# Patient Record
Sex: FEMALE | Race: WHITE | Employment: OTHER | URBAN - METROPOLITAN AREA
[De-identification: names, ages, dates, MRNs, and addresses within clinical notes are randomized per-mention and may not be internally consistent; named-entity substitution may affect disease eponyms.]

---

## 2022-03-18 PROBLEM — I51.89 GRADE II DIASTOLIC DYSFUNCTION: Status: ACTIVE | Noted: 2021-08-16

## 2022-03-18 PROBLEM — E78.5 HYPERLIPIDEMIA: Status: ACTIVE | Noted: 2021-08-16

## 2022-03-18 PROBLEM — R06.09 DOE (DYSPNEA ON EXERTION): Status: ACTIVE | Noted: 2021-08-16

## 2022-03-18 PROBLEM — I34.0 MILD MITRAL REGURGITATION BY PRIOR ECHOCARDIOGRAM: Status: ACTIVE | Noted: 2021-08-16

## 2022-03-19 PROBLEM — I10 HYPERTENSION: Status: ACTIVE | Noted: 2021-08-16

## 2022-03-19 PROBLEM — E66.3 OVERWEIGHT (BMI 25.0-29.9): Status: ACTIVE | Noted: 2021-08-16

## 2023-07-24 ENCOUNTER — INITIAL CONSULT (OUTPATIENT)
Age: 88
End: 2023-07-24
Payer: MEDICARE

## 2023-07-24 VITALS
WEIGHT: 132 LBS | HEIGHT: 60 IN | SYSTOLIC BLOOD PRESSURE: 148 MMHG | BODY MASS INDEX: 25.91 KG/M2 | HEART RATE: 78 BPM | DIASTOLIC BLOOD PRESSURE: 88 MMHG

## 2023-07-24 DIAGNOSIS — I48.0 PAROXYSMAL ATRIAL FIBRILLATION (HCC): Primary | ICD-10-CM

## 2023-07-24 DIAGNOSIS — I10 PRIMARY HYPERTENSION: ICD-10-CM

## 2023-07-24 PROCEDURE — 1123F ACP DISCUSS/DSCN MKR DOCD: CPT | Performed by: INTERNAL MEDICINE

## 2023-07-24 PROCEDURE — 99214 OFFICE O/P EST MOD 30 MIN: CPT | Performed by: INTERNAL MEDICINE

## 2023-07-24 PROCEDURE — 93000 ELECTROCARDIOGRAM COMPLETE: CPT | Performed by: INTERNAL MEDICINE

## 2023-07-24 RX ORDER — OLMESARTAN MEDOXOMIL 40 MG/1
TABLET ORAL
COMMUNITY
Start: 2023-04-20

## 2023-07-24 RX ORDER — DICYCLOMINE HYDROCHLORIDE 10 MG/1
CAPSULE ORAL
COMMUNITY
Start: 2023-04-20

## 2023-07-24 RX ORDER — LEVOTHYROXINE SODIUM 88 UG/1
TABLET ORAL
COMMUNITY
Start: 2023-04-20

## 2023-07-24 RX ORDER — AMLODIPINE BESYLATE 2.5 MG/1
TABLET ORAL
COMMUNITY
Start: 2023-07-20

## 2023-07-24 RX ORDER — ALENDRONATE SODIUM 35 MG/1
TABLET ORAL
COMMUNITY
Start: 2023-04-20

## 2023-07-24 RX ORDER — SULINDAC 150 MG/1
75 TABLET ORAL
COMMUNITY
Start: 2023-04-20

## 2023-07-24 RX ORDER — METOPROLOL TARTRATE 50 MG/1
TABLET, FILM COATED ORAL
COMMUNITY
Start: 2023-05-12

## 2023-07-24 RX ORDER — SIMVASTATIN 40 MG
TABLET ORAL
COMMUNITY
Start: 2023-04-20

## 2023-07-24 RX ORDER — ESCITALOPRAM OXALATE 20 MG/1
TABLET ORAL
COMMUNITY
Start: 2023-05-10

## 2023-07-24 RX ORDER — APIXABAN 2.5 MG/1
TABLET, FILM COATED ORAL
COMMUNITY
Start: 2023-05-12

## 2023-07-24 NOTE — PROGRESS NOTES
[address]  PHONE: [phone]      23    NAME:  Seamus Sanches  : 1935  MRN: [id number]         SUBJECTIVE:   Seamus Sanches is a 81 y.o. female seen for a consultation visit regarding the following:     Chief Complaint   Patient presents with    Consultation    Irregular Heart Beat            HPI:  Consultation is requested by Vanessa Butterfield MD for evaluation of Consultation and Irregular Heart Beat   . Ms. Aneudy Castellanos presents today for follow-up. Patient was referred here by her primary care provider for new diagnosis of atrial fibrillation. Patient was seen 2 years ago by Dr. Avery Mann. At that time she had an echocardiogram which showed some diastolic dysfunction, normal LV systolic function and no significant valve disease. She had a nuclear stress test which was normal.  Patient reports she was seen by her PCP, had some palpitations which she attributed to her walk from the car to the waiting room. She was diagnosed with EKG with atrial fibrillation. Started on Eliquis. Her carvedilol was transitioned to metoprolol. She presents today for evaluation. She says she has had some intermittent, short episodes of palpitation but no other significant issues. She denies chest pain. She has no lower extremity swelling. She has had not had any bleeding issues on Eliquis. Irregular Heart Beat   Associated symptoms include an irregular heartbeat. Key CAD CHF Meds            metoprolol tartrate (LOPRESSOR) 50 MG tablet (Taking)    Class: Historical Med    olmesartan (BENICAR) 40 MG tablet (Taking)    Class: Historical Med    simvastatin (ZOCOR) 40 MG tablet (Taking)    Class: Historical Med    ELIQUIS 2.5 MG TABS tablet (Taking)    Class: Historical Med    amLODIPine (NORVASC) 2.5 MG tablet (Taking)    Class: Historical Med          Key Antihyperglycemic Medications       Patient is on no antihyperglycemic meds.

## 2024-02-02 ENCOUNTER — OFFICE VISIT (OUTPATIENT)
Age: 89
End: 2024-02-02
Payer: MEDICARE

## 2024-02-02 VITALS
HEART RATE: 80 BPM | SYSTOLIC BLOOD PRESSURE: 112 MMHG | DIASTOLIC BLOOD PRESSURE: 78 MMHG | HEIGHT: 60 IN | WEIGHT: 141.6 LBS | BODY MASS INDEX: 27.8 KG/M2

## 2024-02-02 DIAGNOSIS — I48.0 PAROXYSMAL ATRIAL FIBRILLATION (HCC): Primary | ICD-10-CM

## 2024-02-02 DIAGNOSIS — I10 PRIMARY HYPERTENSION: ICD-10-CM

## 2024-02-02 PROCEDURE — 1036F TOBACCO NON-USER: CPT | Performed by: INTERNAL MEDICINE

## 2024-02-02 PROCEDURE — G8428 CUR MEDS NOT DOCUMENT: HCPCS | Performed by: INTERNAL MEDICINE

## 2024-02-02 PROCEDURE — G8484 FLU IMMUNIZE NO ADMIN: HCPCS | Performed by: INTERNAL MEDICINE

## 2024-02-02 PROCEDURE — G8419 CALC BMI OUT NRM PARAM NOF/U: HCPCS | Performed by: INTERNAL MEDICINE

## 2024-02-02 PROCEDURE — 1123F ACP DISCUSS/DSCN MKR DOCD: CPT | Performed by: INTERNAL MEDICINE

## 2024-02-02 PROCEDURE — 1090F PRES/ABSN URINE INCON ASSESS: CPT | Performed by: INTERNAL MEDICINE

## 2024-02-02 PROCEDURE — 99214 OFFICE O/P EST MOD 30 MIN: CPT | Performed by: INTERNAL MEDICINE

## 2024-02-02 RX ORDER — FERROUS SULFATE 325(65) MG
325 TABLET ORAL
COMMUNITY

## 2024-02-02 RX ORDER — CHOLECALCIFEROL (VITAMIN D3) 1250 MCG
CAPSULE ORAL
COMMUNITY

## 2024-02-05 ASSESSMENT — ENCOUNTER SYMPTOMS
SHORTNESS OF BREATH: 0
ABDOMINAL PAIN: 0
ORTHOPNEA: 0

## 2024-02-05 NOTE — PROGRESS NOTES
[facility] CARDIOLOGY  [address] [place] [address], [address]  [place] [address]  PHONE: [phone]      24    NAME:  Junay Foy  : 1935  MRN: [id number]         SUBJECTIVE:   Juany Foy is a 88 y.o. female seen for a follow up visit regarding the following:     Chief Complaint   Patient presents with    Hypertension            HPI:  Follow up  Hypertension   .    Ms. Foy presents today for follow-up.  She doing well has no complaints today.  She denies chest pain, shortness of breath, orthopnea, PND, palpitations, syncope or lower extremity swelling.  She reports compliance with Eliquis and no bleeding issues.  Denies any recent hospitalizations     Hypertension  Pertinent negatives include no chest pain, orthopnea, palpitations, PND or shortness of breath.           Key CAD CHF Meds            metoprolol tartrate (LOPRESSOR) 50 MG tablet (Taking)    Class: Historical Med    olmesartan (BENICAR) 40 MG tablet (Taking)    Class: Historical Med    simvastatin (ZOCOR) 40 MG tablet (Taking)    Class: Historical Med    ELIQUIS 2.5 MG TABS tablet (Taking)    Class: Historical Med    amLODIPine (NORVASC) 2.5 MG tablet (Taking)    Class: Historical Med          Key Antihyperglycemic Medications       Patient is on no antihyperglycemic meds.                Past Medical History, Past Surgical History, Family history, Social History, and Medications were all reviewed with the patient today and updated as necessary.     Prior to Admission medications    Medication Sig Start Date End Date Taking? Authorizing Provider   Cholecalciferol (VITAMIN D3) 1.25 MG (07725 UT) CAPS Take by mouth   Yes Adam Noland MD   ferrous sulfate (IRON 325) 325 (65 Fe) MG tablet Take 1 tablet by mouth   Yes Adam Noland MD   escitalopram (LEXAPRO) 20 MG tablet  5/10/23  Yes Adam Noland MD   metoprolol tartrate (LOPRESSOR) 50 MG tablet  23  Yes Adam Noland MD   olmesartan

## 2024-03-16 ENCOUNTER — TRANSCRIBE ORDERS (OUTPATIENT)
Dept: SCHEDULING | Age: 89
End: 2024-03-16

## 2024-03-16 DIAGNOSIS — Z12.31 SCREENING MAMMOGRAM FOR HIGH-RISK PATIENT: Primary | ICD-10-CM

## 2024-06-17 ENCOUNTER — APPOINTMENT (OUTPATIENT)
Dept: CT IMAGING | Age: 89
DRG: 308 | End: 2024-06-17
Payer: MEDICARE

## 2024-06-17 ENCOUNTER — HOSPITAL ENCOUNTER (EMERGENCY)
Dept: ULTRASOUND IMAGING | Age: 89
Discharge: HOME | DRG: 308 | End: 2024-06-20
Payer: MEDICARE

## 2024-06-17 ENCOUNTER — APPOINTMENT (OUTPATIENT)
Dept: GENERAL RADIOLOGY | Age: 89
DRG: 308 | End: 2024-06-17
Payer: MEDICARE

## 2024-06-17 ENCOUNTER — HOSPITAL ENCOUNTER (INPATIENT)
Age: 89
LOS: 22 days | DRG: 308 | End: 2024-07-09
Attending: GENERAL PRACTICE | Admitting: FAMILY MEDICINE
Payer: MEDICARE

## 2024-06-17 DIAGNOSIS — I48.91 ATRIAL FIBRILLATION WITH RAPID VENTRICULAR RESPONSE (HCC): Primary | ICD-10-CM

## 2024-06-17 DIAGNOSIS — I26.99 ACUTE PULMONARY EMBOLISM, UNSPECIFIED PULMONARY EMBOLISM TYPE, UNSPECIFIED WHETHER ACUTE COR PULMONALE PRESENT (HCC): ICD-10-CM

## 2024-06-17 DIAGNOSIS — E87.20 LACTIC ACIDOSIS: ICD-10-CM

## 2024-06-17 DIAGNOSIS — R60.0 LOWER EXTREMITY EDEMA: ICD-10-CM

## 2024-06-17 DIAGNOSIS — J90 PLEURAL EFFUSION: ICD-10-CM

## 2024-06-17 DIAGNOSIS — R17 ELEVATED BILIRUBIN: ICD-10-CM

## 2024-06-17 DIAGNOSIS — R55 NEAR SYNCOPE: ICD-10-CM

## 2024-06-17 DIAGNOSIS — I26.94 MULTIPLE SUBSEGMENTAL PULMONARY EMBOLI WITHOUT ACUTE COR PULMONALE (HCC): ICD-10-CM

## 2024-06-17 LAB
ALBUMIN SERPL-MCNC: 3.4 G/DL (ref 3.2–4.6)
ALBUMIN/GLOB SERPL: 1.9 (ref 1–1.9)
ALP SERPL-CCNC: 227 U/L (ref 35–104)
ALT SERPL-CCNC: 387 U/L (ref 12–65)
ANION GAP SERPL CALC-SCNC: 25 MMOL/L (ref 9–18)
AST SERPL-CCNC: 646 U/L (ref 15–37)
BASOPHILS # BLD: 0 K/UL (ref 0–0.2)
BASOPHILS NFR BLD: 0 % (ref 0–2)
BILIRUB SERPL-MCNC: 4.9 MG/DL (ref 0–1.2)
BUN SERPL-MCNC: 42 MG/DL (ref 8–23)
CALCIUM SERPL-MCNC: 9.2 MG/DL (ref 8.8–10.2)
CHLORIDE SERPL-SCNC: 113 MMOL/L (ref 98–107)
CO2 SERPL-SCNC: 16 MMOL/L (ref 20–28)
CREAT SERPL-MCNC: 1.91 MG/DL (ref 0.6–1.1)
DIFFERENTIAL METHOD BLD: ABNORMAL
EOSINOPHIL # BLD: 0 K/UL (ref 0–0.8)
EOSINOPHIL NFR BLD: 0 % (ref 0.5–7.8)
ERYTHROCYTE [DISTWIDTH] IN BLOOD BY AUTOMATED COUNT: 16.2 % (ref 11.9–14.6)
GLOBULIN SER CALC-MCNC: 1.8 G/DL (ref 2.3–3.5)
GLUCOSE SERPL-MCNC: 165 MG/DL (ref 70–99)
HCT VFR BLD AUTO: 41.8 % (ref 35.8–46.3)
HGB BLD-MCNC: 13 G/DL (ref 11.7–15.4)
IMM GRANULOCYTES # BLD AUTO: 0.1 K/UL (ref 0–0.5)
IMM GRANULOCYTES NFR BLD AUTO: 1 % (ref 0–5)
LACTATE SERPL-SCNC: 4.4 MMOL/L (ref 0.5–2)
LYMPHOCYTES # BLD: 0.4 K/UL (ref 0.5–4.6)
LYMPHOCYTES NFR BLD: 4 % (ref 13–44)
MCH RBC QN AUTO: 32.2 PG (ref 26.1–32.9)
MCHC RBC AUTO-ENTMCNC: 31.1 G/DL (ref 31.4–35)
MCV RBC AUTO: 103.5 FL (ref 82–102)
MONOCYTES # BLD: 0.7 K/UL (ref 0.1–1.3)
MONOCYTES NFR BLD: 7 % (ref 4–12)
NEUTS SEG # BLD: 9 K/UL (ref 1.7–8.2)
NEUTS SEG NFR BLD: 88 % (ref 43–78)
NRBC # BLD: 0.07 K/UL (ref 0–0.2)
NT PRO BNP: ABNORMAL PG/ML (ref 0–450)
PLATELET # BLD AUTO: 171 K/UL (ref 150–450)
PMV BLD AUTO: 10.7 FL (ref 9.4–12.3)
POTASSIUM SERPL-SCNC: 4.6 MMOL/L (ref 3.5–5.1)
PROT SERPL-MCNC: 5.2 G/DL (ref 6.3–8.2)
RBC # BLD AUTO: 4.04 M/UL (ref 4.05–5.2)
SODIUM SERPL-SCNC: 154 MMOL/L (ref 136–145)
TROPONIN T SERPL HS-MCNC: 73 NG/L (ref 0–14)
WBC # BLD AUTO: 10.2 K/UL (ref 4.3–11.1)

## 2024-06-17 PROCEDURE — 76705 ECHO EXAM OF ABDOMEN: CPT

## 2024-06-17 PROCEDURE — 80053 COMPREHEN METABOLIC PANEL: CPT

## 2024-06-17 PROCEDURE — 99285 EMERGENCY DEPT VISIT HI MDM: CPT

## 2024-06-17 PROCEDURE — 85025 COMPLETE CBC W/AUTO DIFF WBC: CPT

## 2024-06-17 PROCEDURE — 96376 TX/PRO/DX INJ SAME DRUG ADON: CPT

## 2024-06-17 PROCEDURE — 83605 ASSAY OF LACTIC ACID: CPT

## 2024-06-17 PROCEDURE — 96374 THER/PROPH/DIAG INJ IV PUSH: CPT

## 2024-06-17 PROCEDURE — 2580000003 HC RX 258: Performed by: GENERAL PRACTICE

## 2024-06-17 PROCEDURE — 1100000000 HC RM PRIVATE

## 2024-06-17 PROCEDURE — 2500000003 HC RX 250 WO HCPCS: Performed by: GENERAL PRACTICE

## 2024-06-17 PROCEDURE — 74174 CTA ABD&PLVS W/CONTRAST: CPT

## 2024-06-17 PROCEDURE — 99291 CRITICAL CARE FIRST HOUR: CPT | Performed by: EMERGENCY MEDICINE

## 2024-06-17 PROCEDURE — 93005 ELECTROCARDIOGRAM TRACING: CPT | Performed by: GENERAL PRACTICE

## 2024-06-17 PROCEDURE — 96361 HYDRATE IV INFUSION ADD-ON: CPT

## 2024-06-17 PROCEDURE — 83880 ASSAY OF NATRIURETIC PEPTIDE: CPT

## 2024-06-17 PROCEDURE — 36415 COLL VENOUS BLD VENIPUNCTURE: CPT

## 2024-06-17 PROCEDURE — 6360000002 HC RX W HCPCS: Performed by: GENERAL PRACTICE

## 2024-06-17 PROCEDURE — 71045 X-RAY EXAM CHEST 1 VIEW: CPT

## 2024-06-17 PROCEDURE — 84484 ASSAY OF TROPONIN QUANT: CPT

## 2024-06-17 PROCEDURE — 87040 BLOOD CULTURE FOR BACTERIA: CPT

## 2024-06-17 PROCEDURE — 6360000004 HC RX CONTRAST MEDICATION: Performed by: GENERAL PRACTICE

## 2024-06-17 RX ORDER — 0.9 % SODIUM CHLORIDE 0.9 %
500 INTRAVENOUS SOLUTION INTRAVENOUS ONCE
Status: COMPLETED | OUTPATIENT
Start: 2024-06-17 | End: 2024-06-17

## 2024-06-17 RX ORDER — HEPARIN SODIUM 1000 [USP'U]/ML
60 INJECTION, SOLUTION INTRAVENOUS; SUBCUTANEOUS PRN
Status: DISCONTINUED | OUTPATIENT
Start: 2024-06-17 | End: 2024-06-18 | Stop reason: SDUPTHER

## 2024-06-17 RX ORDER — DILTIAZEM HYDROCHLORIDE 5 MG/ML
20 INJECTION INTRAVENOUS
Status: COMPLETED | OUTPATIENT
Start: 2024-06-17 | End: 2024-06-17

## 2024-06-17 RX ORDER — HEPARIN SODIUM 10000 [USP'U]/100ML
5-30 INJECTION, SOLUTION INTRAVENOUS CONTINUOUS
Status: DISCONTINUED | OUTPATIENT
Start: 2024-06-18 | End: 2024-06-18 | Stop reason: SDUPTHER

## 2024-06-17 RX ORDER — HEPARIN SODIUM 1000 [USP'U]/ML
60 INJECTION, SOLUTION INTRAVENOUS; SUBCUTANEOUS ONCE
Status: DISCONTINUED | OUTPATIENT
Start: 2024-06-18 | End: 2024-06-18 | Stop reason: SDUPTHER

## 2024-06-17 RX ORDER — 0.9 % SODIUM CHLORIDE 0.9 %
1000 INTRAVENOUS SOLUTION INTRAVENOUS ONCE
Status: COMPLETED | OUTPATIENT
Start: 2024-06-17 | End: 2024-06-18

## 2024-06-17 RX ORDER — HEPARIN SODIUM 1000 [USP'U]/ML
40 INJECTION, SOLUTION INTRAVENOUS; SUBCUTANEOUS PRN
Status: DISCONTINUED | OUTPATIENT
Start: 2024-06-17 | End: 2024-06-18 | Stop reason: SDUPTHER

## 2024-06-17 RX ADMIN — SODIUM CHLORIDE 500 ML: 9 INJECTION, SOLUTION INTRAVENOUS at 20:02

## 2024-06-17 RX ADMIN — SODIUM CHLORIDE 1000 ML: 9 INJECTION, SOLUTION INTRAVENOUS at 23:29

## 2024-06-17 RX ADMIN — SODIUM CHLORIDE 5 MG/HR: 900 INJECTION, SOLUTION INTRAVENOUS at 23:29

## 2024-06-17 RX ADMIN — PIPERACILLIN AND TAZOBACTAM 4500 MG: 4; .5 INJECTION, POWDER, LYOPHILIZED, FOR SOLUTION INTRAVENOUS at 23:28

## 2024-06-17 RX ADMIN — DILTIAZEM HYDROCHLORIDE 20 MG: 5 INJECTION, SOLUTION INTRAVENOUS at 20:01

## 2024-06-17 RX ADMIN — IOPAMIDOL 100 ML: 755 INJECTION, SOLUTION INTRAVENOUS at 22:08

## 2024-06-17 ASSESSMENT — LIFESTYLE VARIABLES
HOW MANY STANDARD DRINKS CONTAINING ALCOHOL DO YOU HAVE ON A TYPICAL DAY: PATIENT DOES NOT DRINK
HOW OFTEN DO YOU HAVE A DRINK CONTAINING ALCOHOL: NEVER

## 2024-06-17 NOTE — ED TRIAGE NOTES
Pt had fall home, fire dept found heart rate in 200's, hx of afib.  Pt was given Adenosine 12mg by fire, given Cardizem 45mg total by EMS.  Pt

## 2024-06-18 ENCOUNTER — APPOINTMENT (OUTPATIENT)
Dept: CT IMAGING | Age: 89
DRG: 308 | End: 2024-06-18
Payer: MEDICARE

## 2024-06-18 ENCOUNTER — APPOINTMENT (OUTPATIENT)
Dept: NON INVASIVE DIAGNOSTICS | Age: 89
DRG: 308 | End: 2024-06-18
Payer: MEDICARE

## 2024-06-18 PROBLEM — I71.40 AAA (ABDOMINAL AORTIC ANEURYSM) (HCC): Status: ACTIVE | Noted: 2024-06-18

## 2024-06-18 PROBLEM — J90 BILATERAL PLEURAL EFFUSION: Status: ACTIVE | Noted: 2024-06-18

## 2024-06-18 PROBLEM — N17.9 AKI (ACUTE KIDNEY INJURY) (HCC): Status: ACTIVE | Noted: 2024-06-18

## 2024-06-18 PROBLEM — E87.0 HYPERNATREMIA: Status: ACTIVE | Noted: 2024-06-18

## 2024-06-18 PROBLEM — I26.99 PULMONARY EMBOLISM (HCC): Status: ACTIVE | Noted: 2024-06-18

## 2024-06-18 PROBLEM — E87.20 METABOLIC ACIDOSIS: Status: ACTIVE | Noted: 2024-06-18

## 2024-06-18 PROBLEM — I48.0 PAROXYSMAL ATRIAL FIBRILLATION (HCC): Chronic | Status: ACTIVE | Noted: 2024-06-18

## 2024-06-18 PROBLEM — R79.89 ELEVATED LFTS: Status: ACTIVE | Noted: 2024-06-18

## 2024-06-18 LAB
ALBUMIN SERPL-MCNC: 3.2 G/DL (ref 3.2–4.6)
ALBUMIN/GLOB SERPL: 1.9 (ref 1–1.9)
ALP SERPL-CCNC: 190 U/L (ref 35–104)
ALT SERPL-CCNC: 306 U/L (ref 12–65)
ANION GAP SERPL CALC-SCNC: 16 MMOL/L (ref 9–18)
APTT PPP: 134.4 SEC (ref 23.3–37.4)
APTT PPP: 30.9 SEC (ref 23.3–37.4)
APTT PPP: >200 SEC (ref 23.3–37.4)
ARTERIAL PATENCY WRIST A: POSITIVE
AST SERPL-CCNC: 319 U/L (ref 15–37)
BASE EXCESS BLD CALC-SCNC: 1.2 MMOL/L
BDY SITE: ABNORMAL
BILIRUB DIRECT SERPL-MCNC: 1.9 MG/DL (ref 0–0.4)
BILIRUB SERPL-MCNC: 3 MG/DL (ref 0–1.2)
BUN SERPL-MCNC: 43 MG/DL (ref 8–23)
CALCIUM SERPL-MCNC: 8.3 MG/DL (ref 8.8–10.2)
CHLORIDE SERPL-SCNC: 111 MMOL/L (ref 98–107)
CO2 SERPL-SCNC: 20 MMOL/L (ref 20–28)
CREAT SERPL-MCNC: 1.54 MG/DL (ref 0.6–1.1)
ECHO AO ASC DIAM: 3.3 CM
ECHO AO ASCENDING AORTA INDEX: 2.01 CM/M2
ECHO AO ROOT DIAM: 3.1 CM
ECHO AO ROOT INDEX: 1.89 CM/M2
ECHO AV AREA PEAK VELOCITY: 1.3 CM2
ECHO AV AREA VTI: 1.4 CM2
ECHO AV AREA/BSA PEAK VELOCITY: 0.8 CM2/M2
ECHO AV AREA/BSA VTI: 0.9 CM2/M2
ECHO AV MEAN GRADIENT: 5 MMHG
ECHO AV MEAN VELOCITY: 1 M/S
ECHO AV PEAK GRADIENT: 8 MMHG
ECHO AV PEAK VELOCITY: 1.4 M/S
ECHO AV VELOCITY RATIO: 0.43
ECHO AV VTI: 24.4 CM
ECHO BSA: 1.68 M2
ECHO EST RA PRESSURE: 15 MMHG
ECHO IVC PROX: 2.6 CM
ECHO LA AREA 2C: 26.5 CM2
ECHO LA AREA 4C: 29.6 CM2
ECHO LA DIAMETER INDEX: 2.56 CM/M2
ECHO LA DIAMETER: 4.2 CM
ECHO LA MAJOR AXIS: 7.1 CM
ECHO LA MINOR AXIS: 6.7 CM
ECHO LA TO AORTIC ROOT RATIO: 1.35
ECHO LA VOL BP: 97 ML (ref 22–52)
ECHO LA VOL MOD A2C: 89 ML (ref 22–52)
ECHO LA VOL MOD A4C: 100 ML (ref 22–52)
ECHO LA VOL/BSA BIPLANE: 59 ML/M2 (ref 16–34)
ECHO LA VOLUME INDEX MOD A2C: 54 ML/M2 (ref 16–34)
ECHO LA VOLUME INDEX MOD A4C: 61 ML/M2 (ref 16–34)
ECHO LV EDV A2C: 66 ML
ECHO LV EDV A4C: 86 ML
ECHO LV EDV INDEX A4C: 52 ML/M2
ECHO LV EDV NDEX A2C: 40 ML/M2
ECHO LV EJECTION FRACTION A2C: 41 %
ECHO LV EJECTION FRACTION A4C: 45 %
ECHO LV EJECTION FRACTION BIPLANE: 43 % (ref 55–100)
ECHO LV ESV A2C: 39 ML
ECHO LV ESV A4C: 48 ML
ECHO LV ESV INDEX A2C: 24 ML/M2
ECHO LV ESV INDEX A4C: 29 ML/M2
ECHO LV FRACTIONAL SHORTENING: 25 % (ref 28–44)
ECHO LV INTERNAL DIMENSION DIASTOLE INDEX: 2.68 CM/M2
ECHO LV INTERNAL DIMENSION DIASTOLIC: 4.4 CM (ref 3.9–5.3)
ECHO LV INTERNAL DIMENSION SYSTOLIC INDEX: 2.01 CM/M2
ECHO LV INTERNAL DIMENSION SYSTOLIC: 3.3 CM
ECHO LV IVSD: 0.9 CM (ref 0.6–0.9)
ECHO LV MASS 2D: 137.8 G (ref 67–162)
ECHO LV MASS INDEX 2D: 84 G/M2 (ref 43–95)
ECHO LV POSTERIOR WALL DIASTOLIC: 1 CM (ref 0.6–0.9)
ECHO LV RELATIVE WALL THICKNESS RATIO: 0.45
ECHO LVOT AREA: 2.8 CM2
ECHO LVOT AV VTI INDEX: 0.49
ECHO LVOT DIAM: 1.9 CM
ECHO LVOT MEAN GRADIENT: 1 MMHG
ECHO LVOT MEAN GRADIENT: 1 MMHG
ECHO LVOT PEAK GRADIENT: 2 MMHG
ECHO LVOT PEAK VELOCITY: 0.6 M/S
ECHO LVOT STROKE VOLUME INDEX: 20.7 ML/M2
ECHO LVOT SV: 34 ML
ECHO LVOT VTI: 12 CM
ECHO RIGHT VENTRICULAR SYSTOLIC PRESSURE (RVSP): 38 MMHG
ECHO RV BASAL DIMENSION: 4.3 CM
ECHO RV INTERNAL DIMENSION: 3.4 CM
ECHO TV REGURGITANT MAX VELOCITY: 2.41 M/S
ECHO TV REGURGITANT PEAK GRADIENT: 23 MMHG
EKG ATRIAL RATE: 164 BPM
EKG ATRIAL RATE: 219 BPM
EKG DIAGNOSIS: NORMAL
EKG DIAGNOSIS: NORMAL
EKG Q-T INTERVAL: 295 MS
EKG Q-T INTERVAL: 299 MS
EKG QRS DURATION: 82 MS
EKG QRS DURATION: 83 MS
EKG QTC CALCULATION (BAZETT): 488 MS
EKG QTC CALCULATION (BAZETT): 502 MS
EKG R AXIS: 100 DEGREES
EKG R AXIS: 125 DEGREES
EKG T AXIS: 180 DEGREES
EKG T AXIS: 198 DEGREES
EKG VENTRICULAR RATE: 164 BPM
EKG VENTRICULAR RATE: 169 BPM
ERYTHROCYTE [DISTWIDTH] IN BLOOD BY AUTOMATED COUNT: 16.3 % (ref 11.9–14.6)
GAS FLOW.O2 O2 DELIVERY SYS: ABNORMAL
GLOBULIN SER CALC-MCNC: 1.7 G/DL (ref 2.3–3.5)
GLUCOSE SERPL-MCNC: 258 MG/DL (ref 70–99)
HAV IGM SER QL: NONREACTIVE
HBV CORE IGM SER QL: NONREACTIVE
HBV SURFACE AG SER QL: NONREACTIVE
HCO3 BLD-SCNC: 26.7 MMOL/L (ref 22–26)
HCT VFR BLD AUTO: 42 % (ref 35.8–46.3)
HCV AB SER QL: NONREACTIVE
HGB BLD-MCNC: 12.8 G/DL (ref 11.7–15.4)
INR PPP: 2.1
LACTATE SERPL-SCNC: 1.9 MMOL/L (ref 0.5–2)
LACTATE SERPL-SCNC: 2.2 MMOL/L (ref 0.5–2)
LACTATE SERPL-SCNC: 3.3 MMOL/L (ref 0.5–2)
MCH RBC QN AUTO: 32.3 PG (ref 26.1–32.9)
MCHC RBC AUTO-ENTMCNC: 30.5 G/DL (ref 31.4–35)
MCV RBC AUTO: 106.1 FL (ref 82–102)
NRBC # BLD: 0.07 K/UL (ref 0–0.2)
O2/TOTAL GAS SETTING VFR VENT: 45 %
PCO2 BLD: 44.6 MMHG (ref 35–45)
PH BLD: 7.39 (ref 7.35–7.45)
PLATELET # BLD AUTO: 142 K/UL (ref 150–450)
PMV BLD AUTO: 10.3 FL (ref 9.4–12.3)
PO2 BLD: 89 MMHG (ref 75–100)
POTASSIUM SERPL-SCNC: 4 MMOL/L (ref 3.5–5.1)
PROT SERPL-MCNC: 4.9 G/DL (ref 6.3–8.2)
PROTHROMBIN TIME: 25 SEC (ref 11.3–14.9)
RBC # BLD AUTO: 3.96 M/UL (ref 4.05–5.2)
SAO2 % BLD: 96.6 % (ref 95–98)
SERVICE CMNT-IMP: ABNORMAL
SODIUM SERPL-SCNC: 147 MMOL/L (ref 136–145)
SPECIMEN TYPE: ABNORMAL
WBC # BLD AUTO: 10.4 K/UL (ref 4.3–11.1)

## 2024-06-18 PROCEDURE — 80048 BASIC METABOLIC PNL TOTAL CA: CPT

## 2024-06-18 PROCEDURE — 36600 WITHDRAWAL OF ARTERIAL BLOOD: CPT

## 2024-06-18 PROCEDURE — 2700000000 HC OXYGEN THERAPY PER DAY

## 2024-06-18 PROCEDURE — 83605 ASSAY OF LACTIC ACID: CPT

## 2024-06-18 PROCEDURE — 6360000002 HC RX W HCPCS: Performed by: EMERGENCY MEDICINE

## 2024-06-18 PROCEDURE — 2500000003 HC RX 250 WO HCPCS: Performed by: EMERGENCY MEDICINE

## 2024-06-18 PROCEDURE — 82803 BLOOD GASES ANY COMBINATION: CPT

## 2024-06-18 PROCEDURE — 2500000003 HC RX 250 WO HCPCS: Performed by: FAMILY MEDICINE

## 2024-06-18 PROCEDURE — 2580000003 HC RX 258: Performed by: INTERNAL MEDICINE

## 2024-06-18 PROCEDURE — 2000000000 HC ICU R&B

## 2024-06-18 PROCEDURE — 6370000000 HC RX 637 (ALT 250 FOR IP): Performed by: FAMILY MEDICINE

## 2024-06-18 PROCEDURE — 6360000002 HC RX W HCPCS: Performed by: FAMILY MEDICINE

## 2024-06-18 PROCEDURE — 6360000002 HC RX W HCPCS: Performed by: GENERAL PRACTICE

## 2024-06-18 PROCEDURE — 2580000003 HC RX 258: Performed by: FAMILY MEDICINE

## 2024-06-18 PROCEDURE — 85610 PROTHROMBIN TIME: CPT

## 2024-06-18 PROCEDURE — C8929 TTE W OR WO FOL WCON,DOPPLER: HCPCS

## 2024-06-18 PROCEDURE — 85730 THROMBOPLASTIN TIME PARTIAL: CPT

## 2024-06-18 PROCEDURE — 80074 ACUTE HEPATITIS PANEL: CPT

## 2024-06-18 PROCEDURE — 93010 ELECTROCARDIOGRAM REPORT: CPT | Performed by: INTERNAL MEDICINE

## 2024-06-18 PROCEDURE — 93306 TTE W/DOPPLER COMPLETE: CPT | Performed by: INTERNAL MEDICINE

## 2024-06-18 PROCEDURE — 6360000004 HC RX CONTRAST MEDICATION: Performed by: INTERNAL MEDICINE

## 2024-06-18 PROCEDURE — 80076 HEPATIC FUNCTION PANEL: CPT

## 2024-06-18 PROCEDURE — 99233 SBSQ HOSP IP/OBS HIGH 50: CPT | Performed by: INTERNAL MEDICINE

## 2024-06-18 PROCEDURE — 2500000003 HC RX 250 WO HCPCS: Performed by: NURSE PRACTITIONER

## 2024-06-18 PROCEDURE — 85027 COMPLETE CBC AUTOMATED: CPT

## 2024-06-18 PROCEDURE — 36415 COLL VENOUS BLD VENIPUNCTURE: CPT

## 2024-06-18 PROCEDURE — 70450 CT HEAD/BRAIN W/O DYE: CPT

## 2024-06-18 PROCEDURE — 99223 1ST HOSP IP/OBS HIGH 75: CPT | Performed by: INTERNAL MEDICINE

## 2024-06-18 RX ORDER — HEPARIN SODIUM 10000 [USP'U]/100ML
5-30 INJECTION, SOLUTION INTRAVENOUS CONTINUOUS
Status: DISCONTINUED | OUTPATIENT
Start: 2024-06-18 | End: 2024-06-22 | Stop reason: DRUGHIGH

## 2024-06-18 RX ORDER — ASPIRIN 81 MG/1
81 TABLET ORAL DAILY
Status: DISCONTINUED | OUTPATIENT
Start: 2024-06-18 | End: 2024-06-19

## 2024-06-18 RX ORDER — AMLODIPINE BESYLATE 5 MG/1
5 TABLET ORAL DAILY
Status: DISCONTINUED | OUTPATIENT
Start: 2024-06-19 | End: 2024-06-19

## 2024-06-18 RX ORDER — FUROSEMIDE 10 MG/ML
40 INJECTION INTRAMUSCULAR; INTRAVENOUS ONCE
Status: COMPLETED | OUTPATIENT
Start: 2024-06-18 | End: 2024-06-18

## 2024-06-18 RX ORDER — HEPARIN SODIUM 1000 [USP'U]/ML
40 INJECTION, SOLUTION INTRAVENOUS; SUBCUTANEOUS PRN
Status: DISCONTINUED | OUTPATIENT
Start: 2024-06-18 | End: 2024-06-21 | Stop reason: SDUPTHER

## 2024-06-18 RX ORDER — LOSARTAN POTASSIUM 50 MG/1
100 TABLET ORAL DAILY
Status: DISCONTINUED | OUTPATIENT
Start: 2024-06-18 | End: 2024-06-20

## 2024-06-18 RX ORDER — FERROUS SULFATE 325(65) MG
325 TABLET ORAL
Status: DISCONTINUED | OUTPATIENT
Start: 2024-06-18 | End: 2024-07-04

## 2024-06-18 RX ORDER — METOPROLOL TARTRATE 1 MG/ML
5 INJECTION, SOLUTION INTRAVENOUS ONCE
Status: COMPLETED | OUTPATIENT
Start: 2024-06-18 | End: 2024-06-18

## 2024-06-18 RX ORDER — FUROSEMIDE 10 MG/ML
20 INJECTION INTRAMUSCULAR; INTRAVENOUS DAILY
Status: DISCONTINUED | OUTPATIENT
Start: 2024-06-18 | End: 2024-06-20

## 2024-06-18 RX ORDER — HEPARIN SODIUM 1000 [USP'U]/ML
80 INJECTION, SOLUTION INTRAVENOUS; SUBCUTANEOUS PRN
Status: DISCONTINUED | OUTPATIENT
Start: 2024-06-18 | End: 2024-06-21 | Stop reason: SDUPTHER

## 2024-06-18 RX ORDER — HEPARIN SODIUM 1000 [USP'U]/ML
80 INJECTION, SOLUTION INTRAVENOUS; SUBCUTANEOUS ONCE
Status: COMPLETED | OUTPATIENT
Start: 2024-06-18 | End: 2024-06-18

## 2024-06-18 RX ORDER — METOPROLOL TARTRATE 1 MG/ML
5 INJECTION, SOLUTION INTRAVENOUS EVERY 4 HOURS PRN
Status: DISCONTINUED | OUTPATIENT
Start: 2024-06-18 | End: 2024-06-18 | Stop reason: SDUPTHER

## 2024-06-18 RX ORDER — ESCITALOPRAM OXALATE 10 MG/1
20 TABLET ORAL DAILY
Status: DISCONTINUED | OUTPATIENT
Start: 2024-06-18 | End: 2024-07-04

## 2024-06-18 RX ORDER — METOPROLOL TARTRATE 50 MG/1
50 TABLET, FILM COATED ORAL 2 TIMES DAILY
Status: DISCONTINUED | OUTPATIENT
Start: 2024-06-18 | End: 2024-06-21

## 2024-06-18 RX ORDER — METOPROLOL TARTRATE 1 MG/ML
5 INJECTION, SOLUTION INTRAVENOUS EVERY 4 HOURS PRN
Status: DISPENSED | OUTPATIENT
Start: 2024-06-18 | End: 2024-06-20

## 2024-06-18 RX ORDER — LEVOTHYROXINE SODIUM 88 UG/1
88 TABLET ORAL DAILY
Status: DISCONTINUED | OUTPATIENT
Start: 2024-06-18 | End: 2024-07-04

## 2024-06-18 RX ADMIN — METOPROLOL TARTRATE 5 MG: 5 INJECTION INTRAVENOUS at 04:41

## 2024-06-18 RX ADMIN — METOPROLOL TARTRATE 50 MG: 25 TABLET, FILM COATED ORAL at 21:00

## 2024-06-18 RX ADMIN — LEVOTHYROXINE SODIUM 88 MCG: 0.09 TABLET ORAL at 09:06

## 2024-06-18 RX ADMIN — METOROPROLOL TARTRATE 5 MG: 5 INJECTION, SOLUTION INTRAVENOUS at 00:57

## 2024-06-18 RX ADMIN — METOPROLOL TARTRATE 5 MG: 5 INJECTION INTRAVENOUS at 17:31

## 2024-06-18 RX ADMIN — FERROUS SULFATE TAB 325 MG (65 MG ELEMENTAL FE) 325 MG: 325 (65 FE) TAB at 12:09

## 2024-06-18 RX ADMIN — FUROSEMIDE 20 MG: 10 INJECTION, SOLUTION INTRAMUSCULAR; INTRAVENOUS at 08:28

## 2024-06-18 RX ADMIN — METOPROLOL TARTRATE 5 MG: 5 INJECTION INTRAVENOUS at 22:37

## 2024-06-18 RX ADMIN — METOPROLOL TARTRATE 50 MG: 25 TABLET, FILM COATED ORAL at 08:28

## 2024-06-18 RX ADMIN — SODIUM CHLORIDE 15 MG/HR: 900 INJECTION, SOLUTION INTRAVENOUS at 06:46

## 2024-06-18 RX ADMIN — HEPARIN SODIUM 18 UNITS/KG/HR: 10000 INJECTION, SOLUTION INTRAVENOUS at 00:49

## 2024-06-18 RX ADMIN — ASPIRIN 81 MG: 81 TABLET, COATED ORAL at 08:37

## 2024-06-18 RX ADMIN — ESCITALOPRAM OXALATE 20 MG: 10 TABLET ORAL at 08:28

## 2024-06-18 RX ADMIN — FUROSEMIDE 40 MG: 10 INJECTION, SOLUTION INTRAMUSCULAR; INTRAVENOUS at 03:46

## 2024-06-18 RX ADMIN — HEPARIN SODIUM 6200 UNITS: 1000 INJECTION INTRAVENOUS; SUBCUTANEOUS at 00:42

## 2024-06-18 RX ADMIN — SODIUM CHLORIDE 15 MG/HR: 900 INJECTION, SOLUTION INTRAVENOUS at 21:02

## 2024-06-18 RX ADMIN — SODIUM CHLORIDE, PRESERVATIVE FREE 0.3 ML: 5 INJECTION INTRAVENOUS at 14:52

## 2024-06-18 RX ADMIN — SODIUM CHLORIDE 12.5 MG/HR: 900 INJECTION, SOLUTION INTRAVENOUS at 13:48

## 2024-06-18 ASSESSMENT — PAIN SCALES - GENERAL
PAINLEVEL_OUTOF10: 0

## 2024-06-18 NOTE — ASSESSMENT & PLAN NOTE
- Cr is 1.91, no recent prior to compare  - Avoid IVFs given B pleural effusions  - Monitor daily BMP

## 2024-06-18 NOTE — INTERDISCIPLINARY ROUNDS
Multi-D Rounds/Checklist (leapfrog):  Lines: can any be removed?: None    External Urinary Catheter (Active)      DVT Prophylaxis: Ordered heparin GTT  Vent: N/A  Nutrition Ordered/appropriate: Per Primary Team  Can antibiotics or other drugs be stopped? N/A   Inpat Anti-Infectives (From admission, onward)      None          Consults needed: None  A: Is pain control adequate? (has PRNs? Stop drip?) Yes  B: Sedation break and SBT? N/A  C: Is sedation choice appropriate? N/A  D: Delirium/CAM-ICU? No  E: Mobility goals/appropriateness? Yes  F: Family update and plan? Daughter is surrogate decision maker and is being updated daily by primary attending and nursing staff.    Selene Moreau, APRN - NP

## 2024-06-18 NOTE — ED PROVIDER NOTES
Emergency Department Provider Note       PCP: Santiago Harry MD   Age: 89 y.o.   Sex: female     DISPOSITION Admitted 06/18/2024 12:22:52 AM       ICD-10-CM    1. Atrial fibrillation with rapid ventricular response (HCC)  I48.91       2. Near syncope  R55       3. Multiple subsegmental pulmonary emboli without acute cor pulmonale (HCC)  I26.94       4. Lactic acidosis  E87.20       5. Acute pulmonary embolism, unspecified pulmonary embolism type, unspecified whether acute cor pulmonale present (HCC)  I26.99 Echo (TTE) complete (PRN contrast/bubble/strain/3D)     Echo (TTE) complete (PRN contrast/bubble/strain/3D)          Medical Decision Making     Patient presents with a near syncopal event and is found to have significant elevated heart rate which is found to be atrial fibrillation.  Patient was refractory to initial boluses of diltiazem.  I started diltiazem infusion.  Due to her overall appearance with respiratory distress and hypoxia and lab abnormalities I broaden the workup and ordered a CT of her chest especially based off the chest x-ray reading concern for aortic pathology.  Aortic pathology was ruled out but patient was found to have pulmonary embolism.  Patient was started on heparin infusion.  Patient continued to maintain normal mental status and though there was some dyspnea she was able to complete full sentences.  I have consulted with cardiology regarding recommendations with heart management in the setting of being refractory to diltiazem and pulmonary embolism.  Intensivist is also involved in the case.  Do not feel that patient needs IR as these pulmonary emboli are small.  Patient will be admitted to the ICU for further workup and management.  I believe these vital sign abnormalities and lactic acidosis can be related to pulmonary emboli, congestive heart failure, and atrial fibrillation with rapid ventricular response.  I doubt severe sepsis or septic shock.     1 or more acute

## 2024-06-18 NOTE — ASSESSMENT & PLAN NOTE
- Concerning elevations in ALP, ALT, AST, and t.bili  - CT shows a \"benign cyst\" at R lobe of liver  - Dedicated RUQ US only shows same cyst  - Avoid hepatotoxic medications  - Consult with GI

## 2024-06-18 NOTE — ASSESSMENT & PLAN NOTE
- Anion gap is 25, lactic acid is 4.4  - Does not appear to be due to overt infectious etiology.  Will defer continuation of antibiotics at this time  - Will need to be judicious with IVFs given h/o dCHF  - LA is improving though.  Continue to monitor until <2  - Will check ABG

## 2024-06-18 NOTE — ASSESSMENT & PLAN NOTE
- Unclear etiology  - Monitor for now  - Avoid rapid overcorrection  - Recheck Na in AM and Q8H afterwards

## 2024-06-18 NOTE — ASSESSMENT & PLAN NOTE
- Patient reports that she is taking her eliquis as prescribed  - Unclear why she has pulmonary emboli   - Likely contributing to hypoxia  - Intensivist consulted with IR.  No intervention needed  - Currently on heparin drip as started by ER given presumed failure of eliquis

## 2024-06-18 NOTE — CARE COORDINATION
Case Management Assessment  Initial Evaluation    Date/Time of Evaluation: 6/18/2024 12:29 PM  Assessment Completed by: Anne-Marie Wright RN    If patient is discharged prior to next notation, then this note serves as note for discharge by case management.    Patient Name: Juany Foy                   YOB: 1935  Diagnosis: Lactic acidosis [E87.20]  Near syncope [R55]  Atrial fibrillation with rapid ventricular response (HCC) [I48.91]  Multiple subsegmental pulmonary emboli without acute cor pulmonale (HCC) [I26.94]                   Date / Time: 6/17/2024  7:32 PM    Patient Admission Status: Inpatient   Readmission Risk (Low < 19, Mod (19-27), High > 27): Readmission Risk Score: 16    Current PCP: Santiago Harry MD  PCP verified by CM? (P) Yes (different MD but still same practice)    Chart Reviewed: Yes      History Provided by: (P) Child/Family (daughter, Kriss)  Patient Orientation: (P) Alert and Oriented    Patient Cognition: (P) Alert    Hospitalization in the last 30 days (Readmission):  No    If yes, Readmission Assessment in CM Navigator will be completed.    Advance Directives:      Code Status: Full Code   Patient's Primary Decision Maker is: (P) Legal Next of Kin      Discharge Planning:    Patient lives with: (P) Alone Type of Home: (P) Other (Comment) (pending)  Primary Care Giver: (P) Self  Patient Support Systems include: (P) Children, Family Members   Current Financial resources: (P) Medicare ([facility])  Current community resources: (P) None  Current services prior to admission: (P) None            Current DME:  none            Type of Home Care services:  None    ADLS  Prior functional level: (P) Independent in ADLs/IADLs  Current functional level: (P) Assistance with the following:    PT AM-PAC:   /24  OT AM-PAC:   /24    Family can provide assistance at DC: (P) Yes  Would you like Case Management to discuss the discharge plan with any other family members/significant others,

## 2024-06-18 NOTE — H&P
Hospitalist History and Physical   Admit Date:  2024  7:32 PM   Name:  Juany Foy   Age:  89 y.o.  Sex:  female  :  1935   MRN:  [id number]     Presenting Complaint: fall  Reason(s) for Admission: Lactic acidosis [E87.20]  Near syncope [R55]  Atrial fibrillation with rapid ventricular response (HCC) [I48.91]  Multiple subsegmental pulmonary emboli without acute cor pulmonale (HCC) [I26.94]     History of Present Illness:   Juany Foy is a 89 y.o. female with medical history of HTN, afib, hypothyroidism who presented to ED with report of a near syncopal episode at home.  Patient reported some dizziness.  EMS was called out and found patient to be tachycardic in the 200s.  She denies any chest pain or shortness of breath (however on exam, patient is obviously dyspneic and tachypneic).  Patient is very clearly diminishing her symptoms.  Upon ER evaluation, multiple lab abnormalities noted:  Na is 154, Cr is 1.91, anion gap is 25, lactic acid is 4.4, pBNP is 28.871, ALP is 227, ALT is 387, AST is 646, t.bili is 4.9.    CXR shows:  IMPRESSION:  Widening of the mediastinum which may be due to a thoracic aortic  aneurysm. This also may be due to limited portable technique. CTA of the chest  can be obtained if clinically appropriate.  Right basilar airspace disease.  CTA chest/ab/pel shows:  IMPRESSION:  1.  Thrombus seen in 1 of the right upper lobe pulmonary arteries as  well as small foci of thrombus in at least 2 of the right lower lobe  pulmonary arteries.  Minimal thrombus is seen in the left lower lobe  pulmonary artery.  2.  Bilateral pleural effusions appearing larger on the right than the  left.  Compressive atelectasis posteriorly in the right lung base.  3.  5 cm benign cyst right lobe of the liver.  4.  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  EKG shows rapid afib.  Patient started on cardizem and heparin drips

## 2024-06-18 NOTE — PLAN OF CARE
Problem: Discharge Planning  Goal: Discharge to home or other facility with appropriate resources  6/18/2024 1345 by Katerin Lind RN  Outcome: Progressing  Flowsheets (Taken 6/18/2024 0747)  Discharge to home or other facility with appropriate resources: Identify barriers to discharge with patient and caregiver  6/18/2024 0316 by Marry Livingston RN  Outcome: Not Progressing  Flowsheets (Taken 6/18/2024 0141)  Discharge to home or other facility with appropriate resources: Identify barriers to discharge with patient and caregiver     Problem: Skin/Tissue Integrity  Goal: Absence of new skin breakdown  Description: 1.  Monitor for areas of redness and/or skin breakdown  2.  Assess vascular access sites hourly  3.  Every 4-6 hours minimum:  Change oxygen saturation probe site  4.  Every 4-6 hours:  If on nasal continuous positive airway pressure, respiratory therapy assess nares and determine need for appliance change or resting period.  6/18/2024 1345 by Katerin Lind RN  Outcome: Progressing  6/18/2024 0316 by Marry Livingston RN  Outcome: Progressing     Problem: Safety - Adult  Goal: Free from fall injury  6/18/2024 1345 by Katerin Lind RN  Outcome: Progressing  6/18/2024 0316 by Marry Livingston RN  Outcome: Progressing     Problem: ABCDS Injury Assessment  Goal: Absence of physical injury  Outcome: Progressing  Flowsheets (Taken 6/18/2024 0141 by Marry Livingston RN)  Absence of Physical Injury: Implement safety measures based on patient assessment     Problem: Discharge Planning  Goal: Discharge to home or other facility with appropriate resources  6/18/2024 1345 by Katerin Lind RN  Outcome: Progressing  Flowsheets (Taken 6/18/2024 0747)  Discharge to home or other facility with appropriate resources: Identify barriers to discharge with patient and caregiver  6/18/2024 0316 by Marry Livingston RN  Outcome: Not Progressing  Flowsheets (Taken 6/18/2024 0141)  Discharge to

## 2024-06-18 NOTE — ACP (ADVANCE CARE PLANNING)
Advance Care Planning     Advance Care Planning Activator (Inpatient)  Conversation Note      Date of ACP Conversation: 6/18/2024     ACP Activator: Anne-Marie Wright RN    {When Decision Maker makes decisions on behalf of the incapacitated patient: Decision Maker is asked to consider and make decisions based on patient values, known preferences, or best interests.     Health Care Decision Maker: NO LW/HCPOA on file. Pt . Has 3 adult children who are legal NOK unless document presented stating otherwise.         Current Designated Health Care Decision Maker: Primary contact -Debbie Penny -daughter -[phone]    Click here to complete Healthcare Decision Makers including section of the Healthcare Decision Maker Relationship (ie \"Primary\")  Today we documented Decision Maker(s) consistent with Legal Next of Kin hierarchy.    Care Preferences    Full code per MD orders.

## 2024-06-18 NOTE — ASSESSMENT & PLAN NOTE
- Requiring cardizem drip.  Consult to Cardiology as patient is followed by [facility] Cardiology.  Very much appreciate Cardiology NP urgent evaluation in the ER and recommendations  - Avoid amio given significantly elevated LFTs  - PRN IV lopressor as needed on top of cardizem drip to control HR  - Check echo

## 2024-06-18 NOTE — ASSESSMENT & PLAN NOTE
- Incidental finding on CT.  Diameter of 2.5cm  - Will need outpatient follow up with Vascular for close monitoring

## 2024-06-18 NOTE — ED NOTES
TRANSFER - OUT REPORT:    Verbal report given to nurse on Juany Foy  being transferred to [facility] for routine progression of patient care       Report consisted of patient's Situation, Background, Assessment and   Recommendations(SBAR).     Information from the following report(s) Nurse Handoff Report was reviewed with the receiving nurse.    Diana Fall Assessment:    Presents to emergency department  because of falls (Syncope, seizure, or loss of consciousness): Yes  Age > 70: Yes  Altered Mental Status, Intoxication with alcohol or substance confusion (Disorientation, impaired judgment, poor safety awaremess, or inability to follow instructions): No  Impaired Mobility: Ambulates or transfers with assistive devices or assistance; Unable to ambulate or transer.: No  Nursing Judgement: Yes          Lines:   Peripheral IV 06/17/24 Left;Posterior Wrist (Active)   Site Assessment Clean, dry & intact 06/17/24 1956   Line Status Blood return noted;Flushed;Normal saline locked 06/17/24 1956   Dressing Status Clean, dry & intact 06/17/24 1956   Dressing Type Transparent 06/17/24 1956       Peripheral IV 06/17/24 Right Antecubital (Active)   Site Assessment Clean, dry & intact 06/17/24 2147   Line Status Blood return noted;Flushed 06/17/24 2147   Phlebitis Assessment No symptoms 06/17/24 2147   Infiltration Assessment 0 06/17/24 2147        Opportunity for questions and clarification was provided.      Patient transported with:  Registered Nurse          Nguyen, Apple, RN  06/18/24 1025

## 2024-06-18 NOTE — ASSESSMENT & PLAN NOTE
- Given IV lasix in ER  - Will monitor for diuresis  - Does have h/o dCHF per chart  - Possible JOE with Cr of 1.91.  Will need to monitor renal function.

## 2024-06-18 NOTE — ASSESSMENT & PLAN NOTE
- BP currently stable  - Continue home metoprolol as able  - Currently on cardizem drip for rapid afib

## 2024-06-19 ENCOUNTER — ANESTHESIA EVENT (OUTPATIENT)
Dept: ENDOSCOPY | Age: 89
End: 2024-06-19
Payer: MEDICARE

## 2024-06-19 PROBLEM — R09.02 HYPOXIA: Status: ACTIVE | Noted: 2024-06-19

## 2024-06-19 PROBLEM — R17 ELEVATED BILIRUBIN: Status: ACTIVE | Noted: 2024-06-17

## 2024-06-19 LAB
ALBUMIN SERPL-MCNC: 3 G/DL (ref 3.2–4.6)
ALBUMIN/GLOB SERPL: 1.4 (ref 1–1.9)
ALP SERPL-CCNC: 196 U/L (ref 35–104)
ALT SERPL-CCNC: 300 U/L (ref 12–65)
ANION GAP SERPL CALC-SCNC: 12 MMOL/L (ref 9–18)
APTT PPP: 84.1 SEC (ref 23.3–37.4)
APTT PPP: 91.9 SEC (ref 23.3–37.4)
AST SERPL-CCNC: 209 U/L (ref 15–37)
BASOPHILS # BLD: 0 K/UL (ref 0–0.2)
BASOPHILS NFR BLD: 0 % (ref 0–2)
BILIRUB SERPL-MCNC: 1.6 MG/DL (ref 0–1.2)
BUN SERPL-MCNC: 43 MG/DL (ref 8–23)
CALCIUM SERPL-MCNC: 8.1 MG/DL (ref 8.8–10.2)
CHLORIDE SERPL-SCNC: 108 MMOL/L (ref 98–107)
CO2 SERPL-SCNC: 25 MMOL/L (ref 20–28)
CREAT SERPL-MCNC: 1.46 MG/DL (ref 0.6–1.1)
DIFFERENTIAL METHOD BLD: ABNORMAL
EOSINOPHIL # BLD: 0 K/UL (ref 0–0.8)
EOSINOPHIL NFR BLD: 0 % (ref 0.5–7.8)
ERYTHROCYTE [DISTWIDTH] IN BLOOD BY AUTOMATED COUNT: 15.4 % (ref 11.9–14.6)
GLOBULIN SER CALC-MCNC: 2.1 G/DL (ref 2.3–3.5)
GLUCOSE SERPL-MCNC: 210 MG/DL (ref 70–99)
HCT VFR BLD AUTO: 41.2 % (ref 35.8–46.3)
HGB BLD-MCNC: 13 G/DL (ref 11.7–15.4)
IMM GRANULOCYTES # BLD AUTO: 0.1 K/UL (ref 0–0.5)
IMM GRANULOCYTES NFR BLD AUTO: 1 % (ref 0–5)
LYMPHOCYTES # BLD: 0.7 K/UL (ref 0.5–4.6)
LYMPHOCYTES NFR BLD: 6 % (ref 13–44)
MAGNESIUM SERPL-MCNC: 1.9 MG/DL (ref 1.8–2.4)
MCH RBC QN AUTO: 32.3 PG (ref 26.1–32.9)
MCHC RBC AUTO-ENTMCNC: 31.6 G/DL (ref 31.4–35)
MCV RBC AUTO: 102.5 FL (ref 82–102)
MONOCYTES # BLD: 0.5 K/UL (ref 0.1–1.3)
MONOCYTES NFR BLD: 5 % (ref 4–12)
NEUTS SEG # BLD: 8.9 K/UL (ref 1.7–8.2)
NEUTS SEG NFR BLD: 88 % (ref 43–78)
NRBC # BLD: 0.04 K/UL (ref 0–0.2)
PLATELET # BLD AUTO: 112 K/UL (ref 150–450)
PMV BLD AUTO: 10.4 FL (ref 9.4–12.3)
POTASSIUM SERPL-SCNC: 3.5 MMOL/L (ref 3.5–5.1)
PROT SERPL-MCNC: 5.1 G/DL (ref 6.3–8.2)
RBC # BLD AUTO: 4.02 M/UL (ref 4.05–5.2)
SODIUM SERPL-SCNC: 146 MMOL/L (ref 136–145)
WBC # BLD AUTO: 10.1 K/UL (ref 4.3–11.1)

## 2024-06-19 PROCEDURE — 6360000002 HC RX W HCPCS: Performed by: FAMILY MEDICINE

## 2024-06-19 PROCEDURE — 94760 N-INVAS EAR/PLS OXIMETRY 1: CPT

## 2024-06-19 PROCEDURE — 6370000000 HC RX 637 (ALT 250 FOR IP): Performed by: INTERNAL MEDICINE

## 2024-06-19 PROCEDURE — 1100000003 HC PRIVATE W/ TELEMETRY

## 2024-06-19 PROCEDURE — 80053 COMPREHEN METABOLIC PANEL: CPT

## 2024-06-19 PROCEDURE — 85025 COMPLETE CBC W/AUTO DIFF WBC: CPT

## 2024-06-19 PROCEDURE — 2700000000 HC OXYGEN THERAPY PER DAY

## 2024-06-19 PROCEDURE — 6370000000 HC RX 637 (ALT 250 FOR IP): Performed by: FAMILY MEDICINE

## 2024-06-19 PROCEDURE — 83735 ASSAY OF MAGNESIUM: CPT

## 2024-06-19 PROCEDURE — 85730 THROMBOPLASTIN TIME PARTIAL: CPT

## 2024-06-19 PROCEDURE — 51701 INSERT BLADDER CATHETER: CPT

## 2024-06-19 PROCEDURE — 36415 COLL VENOUS BLD VENIPUNCTURE: CPT

## 2024-06-19 PROCEDURE — 99232 SBSQ HOSP IP/OBS MODERATE 35: CPT | Performed by: INTERNAL MEDICINE

## 2024-06-19 PROCEDURE — 51798 US URINE CAPACITY MEASURE: CPT

## 2024-06-19 PROCEDURE — 2500000003 HC RX 250 WO HCPCS: Performed by: FAMILY MEDICINE

## 2024-06-19 PROCEDURE — 6360000002 HC RX W HCPCS: Performed by: GENERAL PRACTICE

## 2024-06-19 PROCEDURE — 2580000003 HC RX 258: Performed by: FAMILY MEDICINE

## 2024-06-19 RX ORDER — DILTIAZEM HYDROCHLORIDE 120 MG/1
120 CAPSULE, COATED, EXTENDED RELEASE ORAL 2 TIMES DAILY
Status: DISCONTINUED | OUTPATIENT
Start: 2024-06-19 | End: 2024-06-21

## 2024-06-19 RX ADMIN — LEVOTHYROXINE SODIUM 88 MCG: 0.09 TABLET ORAL at 04:24

## 2024-06-19 RX ADMIN — DILTIAZEM HYDROCHLORIDE 120 MG: 120 CAPSULE, EXTENDED RELEASE ORAL at 14:06

## 2024-06-19 RX ADMIN — METOPROLOL TARTRATE 50 MG: 25 TABLET, FILM COATED ORAL at 21:26

## 2024-06-19 RX ADMIN — DILTIAZEM HYDROCHLORIDE 120 MG: 120 CAPSULE, EXTENDED RELEASE ORAL at 21:26

## 2024-06-19 RX ADMIN — METOPROLOL TARTRATE 50 MG: 25 TABLET, FILM COATED ORAL at 08:07

## 2024-06-19 RX ADMIN — ESCITALOPRAM OXALATE 20 MG: 10 TABLET ORAL at 08:06

## 2024-06-19 RX ADMIN — ASPIRIN 81 MG: 81 TABLET, COATED ORAL at 08:06

## 2024-06-19 RX ADMIN — FUROSEMIDE 20 MG: 10 INJECTION, SOLUTION INTRAMUSCULAR; INTRAVENOUS at 08:07

## 2024-06-19 RX ADMIN — FERROUS SULFATE TAB 325 MG (65 MG ELEMENTAL FE) 325 MG: 325 (65 FE) TAB at 12:17

## 2024-06-19 RX ADMIN — AMLODIPINE BESYLATE 5 MG: 5 TABLET ORAL at 08:06

## 2024-06-19 RX ADMIN — HEPARIN SODIUM 10 UNITS/KG/HR: 10000 INJECTION, SOLUTION INTRAVENOUS at 01:12

## 2024-06-19 RX ADMIN — SODIUM CHLORIDE 10 MG/HR: 900 INJECTION, SOLUTION INTRAVENOUS at 04:24

## 2024-06-19 ASSESSMENT — PAIN SCALES - GENERAL
PAINLEVEL_OUTOF10: 0

## 2024-06-19 NOTE — PERIOP NOTE
Patient will have a procedure on 6/20 with Dr. Lopes. Pre-op arrival of 1500 for procedure time of 1629.

## 2024-06-19 NOTE — CARE COORDINATION
CM reviewed clinical notes and met with patient. Transferred from ICU to Tele for normal progression of care. Pulm and GI following. O2 at 4lpm. Diltiazem and Heparin drips.  Patient reports she is \"feeling better\". Patient voices agreement to SNF placement for transition of care. Patient reports her daughter has the SNF list and can't be bothered today as she has another family member having surgery today.  CM will follow up with daughter.

## 2024-06-19 NOTE — ICUWATCH
RRT Clinical Rounding Nurse Progress Report      SUBJECTIVE: Patient assessed secondary to transfer from critical care.      Vitals:    06/19/24 0732 06/19/24 0806 06/19/24 1225 06/19/24 1406   BP:  101/81 91/76 100/61   Pulse:  89  85   Resp: 16      Temp: 97.5 °F (36.4 °C)      TempSrc: Axillary      SpO2:       Weight:       Height:            DETERIORATION INDEX SCORE: 51    ASSESSMENT:  Pt sitting up in bed, eating dinner, in NAD.  A&Ox3.  On 3L NC, O2 Sat 92-93%, RR unlabored at rest.  Lung sounds clear.  Afib, HR 90-100s on remote telemetry.  BP stable.  Transitioned off dilt gtt.  On po diltiazem and metoprolol.  Heparin gtt continues.  Appetite good.  Denies any pain or complaints.      PLAN:  Will follow per RRT Clinical Rounding Program protocol.  Per Primary RN, minimal UOP today and has received lasix this am.  Has been several hrs since last output.  Plans to I&O cath if unable to void.  GI cx today-- plan for EUS tmrw d/t inc LFTs and Tbili.  Heparin to be held at midnight.     Bisi Whiteside RN  [place]: [phone]  [place]: [phone]

## 2024-06-20 ENCOUNTER — ANESTHESIA (OUTPATIENT)
Dept: ENDOSCOPY | Age: 89
End: 2024-06-20
Payer: MEDICARE

## 2024-06-20 ENCOUNTER — APPOINTMENT (OUTPATIENT)
Dept: GENERAL RADIOLOGY | Age: 89
DRG: 308 | End: 2024-06-20
Payer: MEDICARE

## 2024-06-20 LAB
ALBUMIN SERPL-MCNC: 2.8 G/DL (ref 3.2–4.6)
ALBUMIN/GLOB SERPL: 1.2 (ref 1–1.9)
ALP SERPL-CCNC: 193 U/L (ref 35–104)
ALT SERPL-CCNC: 257 U/L (ref 12–65)
ANION GAP SERPL CALC-SCNC: 11 MMOL/L (ref 9–18)
AST SERPL-CCNC: 135 U/L (ref 15–37)
BASOPHILS # BLD: 0 K/UL (ref 0–0.2)
BASOPHILS NFR BLD: 0 % (ref 0–2)
BILIRUB SERPL-MCNC: 1 MG/DL (ref 0–1.2)
BUN SERPL-MCNC: 44 MG/DL (ref 8–23)
CALCIUM SERPL-MCNC: 8.3 MG/DL (ref 8.8–10.2)
CHLORIDE SERPL-SCNC: 106 MMOL/L (ref 98–107)
CO2 SERPL-SCNC: 26 MMOL/L (ref 20–28)
CREAT SERPL-MCNC: 1.16 MG/DL (ref 0.6–1.1)
DIFFERENTIAL METHOD BLD: ABNORMAL
EOSINOPHIL # BLD: 0 K/UL (ref 0–0.8)
EOSINOPHIL NFR BLD: 0 % (ref 0.5–7.8)
ERYTHROCYTE [DISTWIDTH] IN BLOOD BY AUTOMATED COUNT: 15 % (ref 11.9–14.6)
EST. AVERAGE GLUCOSE BLD GHB EST-MCNC: 153 MG/DL
GLOBULIN SER CALC-MCNC: 2.2 G/DL (ref 2.3–3.5)
GLUCOSE BLD STRIP.AUTO-MCNC: 176 MG/DL (ref 65–100)
GLUCOSE BLD STRIP.AUTO-MCNC: 203 MG/DL (ref 65–100)
GLUCOSE SERPL-MCNC: 212 MG/DL (ref 70–99)
HBA1C MFR BLD: 7 % (ref 0–5.6)
HCT VFR BLD AUTO: 39.7 % (ref 35.8–46.3)
HGB BLD-MCNC: 12.3 G/DL (ref 11.7–15.4)
IMM GRANULOCYTES # BLD AUTO: 0.1 K/UL (ref 0–0.5)
IMM GRANULOCYTES NFR BLD AUTO: 1 % (ref 0–5)
LYMPHOCYTES # BLD: 0.5 K/UL (ref 0.5–4.6)
LYMPHOCYTES NFR BLD: 6 % (ref 13–44)
MCH RBC QN AUTO: 32.3 PG (ref 26.1–32.9)
MCHC RBC AUTO-ENTMCNC: 31 G/DL (ref 31.4–35)
MCV RBC AUTO: 104.2 FL (ref 82–102)
MONOCYTES # BLD: 0.5 K/UL (ref 0.1–1.3)
MONOCYTES NFR BLD: 6 % (ref 4–12)
NEUTS SEG # BLD: 7.1 K/UL (ref 1.7–8.2)
NEUTS SEG NFR BLD: 87 % (ref 43–78)
NRBC # BLD: 0 K/UL (ref 0–0.2)
PLATELET # BLD AUTO: 101 K/UL (ref 150–450)
PMV BLD AUTO: 10.3 FL (ref 9.4–12.3)
POTASSIUM SERPL-SCNC: 3.7 MMOL/L (ref 3.5–5.1)
PROT SERPL-MCNC: 5 G/DL (ref 6.3–8.2)
RBC # BLD AUTO: 3.81 M/UL (ref 4.05–5.2)
SERVICE CMNT-IMP: ABNORMAL
SERVICE CMNT-IMP: ABNORMAL
SODIUM SERPL-SCNC: 143 MMOL/L (ref 136–145)
UFH PPP CHRO-ACNC: <0.1 IU/ML (ref 0.3–0.7)
WBC # BLD AUTO: 8.1 K/UL (ref 4.3–11.1)

## 2024-06-20 PROCEDURE — 3700000001 HC ADD 15 MINUTES (ANESTHESIA): Performed by: INTERNAL MEDICINE

## 2024-06-20 PROCEDURE — 85025 COMPLETE CBC W/AUTO DIFF WBC: CPT

## 2024-06-20 PROCEDURE — 85520 HEPARIN ASSAY: CPT

## 2024-06-20 PROCEDURE — 6360000002 HC RX W HCPCS: Performed by: NURSE ANESTHETIST, CERTIFIED REGISTERED

## 2024-06-20 PROCEDURE — 6370000000 HC RX 637 (ALT 250 FOR IP): Performed by: INTERNAL MEDICINE

## 2024-06-20 PROCEDURE — 6370000000 HC RX 637 (ALT 250 FOR IP): Performed by: FAMILY MEDICINE

## 2024-06-20 PROCEDURE — 7100000000 HC PACU RECOVERY - FIRST 15 MIN: Performed by: INTERNAL MEDICINE

## 2024-06-20 PROCEDURE — 6360000002 HC RX W HCPCS: Performed by: FAMILY MEDICINE

## 2024-06-20 PROCEDURE — 2580000003 HC RX 258: Performed by: NURSE ANESTHETIST, CERTIFIED REGISTERED

## 2024-06-20 PROCEDURE — 2720000010 HC SURG SUPPLY STERILE: Performed by: INTERNAL MEDICINE

## 2024-06-20 PROCEDURE — 97112 NEUROMUSCULAR REEDUCATION: CPT

## 2024-06-20 PROCEDURE — 2500000003 HC RX 250 WO HCPCS: Performed by: NURSE ANESTHETIST, CERTIFIED REGISTERED

## 2024-06-20 PROCEDURE — 97530 THERAPEUTIC ACTIVITIES: CPT

## 2024-06-20 PROCEDURE — 2709999900 HC NON-CHARGEABLE SUPPLY: Performed by: INTERNAL MEDICINE

## 2024-06-20 PROCEDURE — 80053 COMPREHEN METABOLIC PANEL: CPT

## 2024-06-20 PROCEDURE — 6360000002 HC RX W HCPCS: Performed by: INTERNAL MEDICINE

## 2024-06-20 PROCEDURE — 82962 GLUCOSE BLOOD TEST: CPT

## 2024-06-20 PROCEDURE — 94760 N-INVAS EAR/PLS OXIMETRY 1: CPT

## 2024-06-20 PROCEDURE — 7100000001 HC PACU RECOVERY - ADDTL 15 MIN: Performed by: INTERNAL MEDICINE

## 2024-06-20 PROCEDURE — 97165 OT EVAL LOW COMPLEX 30 MIN: CPT

## 2024-06-20 PROCEDURE — 99232 SBSQ HOSP IP/OBS MODERATE 35: CPT | Performed by: INTERNAL MEDICINE

## 2024-06-20 PROCEDURE — 3700000000 HC ANESTHESIA ATTENDED CARE: Performed by: INTERNAL MEDICINE

## 2024-06-20 PROCEDURE — C1769 GUIDE WIRE: HCPCS | Performed by: INTERNAL MEDICINE

## 2024-06-20 PROCEDURE — 3609018500 HC EGD US SCOPE W/ADJACENT STRUCTURES: Performed by: INTERNAL MEDICINE

## 2024-06-20 PROCEDURE — C2617 STENT, NON-COR, TEM W/O DEL: HCPCS | Performed by: INTERNAL MEDICINE

## 2024-06-20 PROCEDURE — 71045 X-RAY EXAM CHEST 1 VIEW: CPT

## 2024-06-20 PROCEDURE — 97161 PT EVAL LOW COMPLEX 20 MIN: CPT

## 2024-06-20 PROCEDURE — 0FC98ZZ EXTIRPATION OF MATTER FROM COMMON BILE DUCT, VIA NATURAL OR ARTIFICIAL OPENING ENDOSCOPIC: ICD-10-PCS | Performed by: INTERNAL MEDICINE

## 2024-06-20 PROCEDURE — 3609018800 HC ERCP DX COLLECTION SPECIMEN BRUSHING/WASHING: Performed by: INTERNAL MEDICINE

## 2024-06-20 PROCEDURE — 83036 HEMOGLOBIN GLYCOSYLATED A1C: CPT

## 2024-06-20 PROCEDURE — 0DJ08ZZ INSPECTION OF UPPER INTESTINAL TRACT, VIA NATURAL OR ARTIFICIAL OPENING ENDOSCOPIC: ICD-10-PCS | Performed by: INTERNAL MEDICINE

## 2024-06-20 PROCEDURE — 36415 COLL VENOUS BLD VENIPUNCTURE: CPT

## 2024-06-20 PROCEDURE — 1100000003 HC PRIVATE W/ TELEMETRY

## 2024-06-20 PROCEDURE — 51798 US URINE CAPACITY MEASURE: CPT

## 2024-06-20 PROCEDURE — 0F798DZ DILATION OF COMMON BILE DUCT WITH INTRALUMINAL DEVICE, VIA NATURAL OR ARTIFICIAL OPENING ENDOSCOPIC: ICD-10-PCS | Performed by: INTERNAL MEDICINE

## 2024-06-20 PROCEDURE — 97535 SELF CARE MNGMENT TRAINING: CPT

## 2024-06-20 PROCEDURE — 2700000000 HC OXYGEN THERAPY PER DAY

## 2024-06-20 DEVICE — BILIARY STENT
Type: IMPLANTABLE DEVICE | Site: BILE DUCT | Status: FUNCTIONAL
Brand: ADVANIX™ BILIARY

## 2024-06-20 RX ORDER — FUROSEMIDE 10 MG/ML
20 INJECTION INTRAMUSCULAR; INTRAVENOUS ONCE
Status: CANCELLED | OUTPATIENT
Start: 2024-06-20

## 2024-06-20 RX ORDER — PROPOFOL 10 MG/ML
INJECTION, EMULSION INTRAVENOUS PRN
Status: DISCONTINUED | OUTPATIENT
Start: 2024-06-20 | End: 2024-06-20 | Stop reason: SDUPTHER

## 2024-06-20 RX ORDER — SODIUM CHLORIDE 9 MG/ML
INJECTION, SOLUTION INTRAVENOUS PRN
Status: DISCONTINUED | OUTPATIENT
Start: 2024-06-20 | End: 2024-06-20 | Stop reason: HOSPADM

## 2024-06-20 RX ORDER — FUROSEMIDE 10 MG/ML
40 INJECTION INTRAMUSCULAR; INTRAVENOUS 2 TIMES DAILY
Status: DISCONTINUED | OUTPATIENT
Start: 2024-06-20 | End: 2024-06-24

## 2024-06-20 RX ORDER — DEXMEDETOMIDINE HYDROCHLORIDE 100 UG/ML
INJECTION, SOLUTION INTRAVENOUS PRN
Status: DISCONTINUED | OUTPATIENT
Start: 2024-06-20 | End: 2024-06-20 | Stop reason: SDUPTHER

## 2024-06-20 RX ORDER — DEXTROSE MONOHYDRATE 100 MG/ML
INJECTION, SOLUTION INTRAVENOUS CONTINUOUS PRN
Status: DISCONTINUED | OUTPATIENT
Start: 2024-06-20 | End: 2024-06-20 | Stop reason: HOSPADM

## 2024-06-20 RX ORDER — ONDANSETRON 2 MG/ML
4 INJECTION INTRAMUSCULAR; INTRAVENOUS
Status: DISCONTINUED | OUTPATIENT
Start: 2024-06-20 | End: 2024-06-20 | Stop reason: HOSPADM

## 2024-06-20 RX ORDER — SODIUM CHLORIDE 0.9 % (FLUSH) 0.9 %
5-40 SYRINGE (ML) INJECTION EVERY 12 HOURS SCHEDULED
Status: DISCONTINUED | OUTPATIENT
Start: 2024-06-20 | End: 2024-06-20 | Stop reason: HOSPADM

## 2024-06-20 RX ORDER — POLYETHYLENE GLYCOL 3350 17 G/17G
17 POWDER, FOR SOLUTION ORAL DAILY
Status: DISCONTINUED | OUTPATIENT
Start: 2024-06-20 | End: 2024-07-04

## 2024-06-20 RX ORDER — SODIUM CHLORIDE, SODIUM LACTATE, POTASSIUM CHLORIDE, CALCIUM CHLORIDE 600; 310; 30; 20 MG/100ML; MG/100ML; MG/100ML; MG/100ML
INJECTION, SOLUTION INTRAVENOUS CONTINUOUS
Status: DISCONTINUED | OUTPATIENT
Start: 2024-06-20 | End: 2024-06-20 | Stop reason: HOSPADM

## 2024-06-20 RX ORDER — SODIUM CHLORIDE, SODIUM LACTATE, POTASSIUM CHLORIDE, CALCIUM CHLORIDE 600; 310; 30; 20 MG/100ML; MG/100ML; MG/100ML; MG/100ML
INJECTION, SOLUTION INTRAVENOUS CONTINUOUS PRN
Status: DISCONTINUED | OUTPATIENT
Start: 2024-06-20 | End: 2024-06-20 | Stop reason: SDUPTHER

## 2024-06-20 RX ORDER — METOPROLOL TARTRATE 1 MG/ML
INJECTION, SOLUTION INTRAVENOUS PRN
Status: DISCONTINUED | OUTPATIENT
Start: 2024-06-20 | End: 2024-06-20 | Stop reason: SDUPTHER

## 2024-06-20 RX ORDER — SODIUM CHLORIDE 0.9 % (FLUSH) 0.9 %
5-40 SYRINGE (ML) INJECTION PRN
Status: DISCONTINUED | OUTPATIENT
Start: 2024-06-20 | End: 2024-06-20 | Stop reason: HOSPADM

## 2024-06-20 RX ORDER — IBUPROFEN 600 MG/1
1 TABLET ORAL PRN
Status: DISCONTINUED | OUTPATIENT
Start: 2024-06-20 | End: 2024-06-20 | Stop reason: HOSPADM

## 2024-06-20 RX ORDER — NALOXONE HYDROCHLORIDE 0.4 MG/ML
INJECTION, SOLUTION INTRAMUSCULAR; INTRAVENOUS; SUBCUTANEOUS PRN
Status: DISCONTINUED | OUTPATIENT
Start: 2024-06-20 | End: 2024-06-20 | Stop reason: HOSPADM

## 2024-06-20 RX ORDER — LIDOCAINE HYDROCHLORIDE 10 MG/ML
1 INJECTION, SOLUTION INFILTRATION; PERINEURAL
Status: DISCONTINUED | OUTPATIENT
Start: 2024-06-20 | End: 2024-06-20 | Stop reason: HOSPADM

## 2024-06-20 RX ORDER — SENNA AND DOCUSATE SODIUM 50; 8.6 MG/1; MG/1
2 TABLET, FILM COATED ORAL 2 TIMES DAILY
Status: DISCONTINUED | OUTPATIENT
Start: 2024-06-20 | End: 2024-07-04

## 2024-06-20 RX ADMIN — DEXMEDETOMIDINE 8 MCG: 100 INJECTION, SOLUTION, CONCENTRATE INTRAVENOUS at 17:59

## 2024-06-20 RX ADMIN — DEXMEDETOMIDINE 8 MCG: 100 INJECTION, SOLUTION, CONCENTRATE INTRAVENOUS at 17:47

## 2024-06-20 RX ADMIN — DEXMEDETOMIDINE 4 MCG: 100 INJECTION, SOLUTION, CONCENTRATE INTRAVENOUS at 17:48

## 2024-06-20 RX ADMIN — PROPOFOL 20 MG: 10 INJECTION, EMULSION INTRAVENOUS at 18:08

## 2024-06-20 RX ADMIN — DILTIAZEM HYDROCHLORIDE 120 MG: 120 CAPSULE, EXTENDED RELEASE ORAL at 21:06

## 2024-06-20 RX ADMIN — SODIUM CHLORIDE, SODIUM LACTATE, POTASSIUM CHLORIDE, AND CALCIUM CHLORIDE: 600; 310; 30; 20 INJECTION, SOLUTION INTRAVENOUS at 17:24

## 2024-06-20 RX ADMIN — PROPOFOL 20 MG: 10 INJECTION, EMULSION INTRAVENOUS at 17:28

## 2024-06-20 RX ADMIN — METOPROLOL TARTRATE 2 MG: 1 INJECTION, SOLUTION INTRAVENOUS at 17:42

## 2024-06-20 RX ADMIN — LEVOTHYROXINE SODIUM 88 MCG: 0.09 TABLET ORAL at 07:01

## 2024-06-20 RX ADMIN — PROPOFOL 20 MG: 10 INJECTION, EMULSION INTRAVENOUS at 17:59

## 2024-06-20 RX ADMIN — DEXMEDETOMIDINE 8 MCG: 100 INJECTION, SOLUTION, CONCENTRATE INTRAVENOUS at 18:08

## 2024-06-20 RX ADMIN — DILTIAZEM HYDROCHLORIDE 120 MG: 120 CAPSULE, EXTENDED RELEASE ORAL at 08:08

## 2024-06-20 RX ADMIN — METOPROLOL TARTRATE 50 MG: 25 TABLET, FILM COATED ORAL at 21:07

## 2024-06-20 RX ADMIN — FUROSEMIDE 20 MG: 10 INJECTION, SOLUTION INTRAMUSCULAR; INTRAVENOUS at 08:08

## 2024-06-20 RX ADMIN — METOPROLOL TARTRATE 50 MG: 25 TABLET, FILM COATED ORAL at 08:07

## 2024-06-20 RX ADMIN — PROPOFOL 20 MG: 10 INJECTION, EMULSION INTRAVENOUS at 17:50

## 2024-06-20 RX ADMIN — FUROSEMIDE 40 MG: 10 INJECTION, SOLUTION INTRAMUSCULAR; INTRAVENOUS at 21:07

## 2024-06-20 RX ADMIN — ESCITALOPRAM OXALATE 20 MG: 10 TABLET ORAL at 08:08

## 2024-06-20 ASSESSMENT — PAIN SCALES - GENERAL: PAINLEVEL_OUTOF10: 0

## 2024-06-20 ASSESSMENT — PAIN - FUNCTIONAL ASSESSMENT: PAIN_FUNCTIONAL_ASSESSMENT: 0-10

## 2024-06-20 NOTE — ANESTHESIA PRE PROCEDURE
Route Frequency Provider Last Rate Last Admin    propofol infusion   IntraVENous PRN Naomy Ward APRN - CRNA   20 mg at 06/20/24 1728       Allergies:    Allergies   Allergen Reactions    Adhesive Tape Rash    Seconal [Secobarbital] Nausea And Vomiting       Problem List:    Patient Active Problem List   Diagnosis Code    Mild mitral regurgitation by prior echocardiogram I34.0    Hyperlipidemia E78.5    Grade II diastolic dysfunction I51.89    TYLER (dyspnea on exertion) R06.09    Overweight (BMI 25.0-29.9) E66.3    Hypertension I10    Atrial fibrillation with rapid ventricular response (HCC) I48.91    Pulmonary embolism (HCC) I26.99    Elevated LFTs R79.89    Paroxysmal atrial fibrillation (HCC) I48.0    Hypernatremia E87.0    Lactic acidosis E87.20    JOE (acute kidney injury) (HCC) N17.9    AAA (abdominal aortic aneurysm) (HCC) I71.40    Bilateral pleural effusion J90    Hypoxia R09.02    Elevated bilirubin R17       Past Medical History:  History reviewed. No pertinent past medical history.    Past Surgical History:  History reviewed. No pertinent surgical history.    Social History:    Social History     Tobacco Use    Smoking status: Never    Smokeless tobacco: Never   Substance Use Topics    Alcohol use: Not on file                                Counseling given: Not Answered      Vital Signs (Current):   Vitals:    06/20/24 0803 06/20/24 0807 06/20/24 1130 06/20/24 1704   BP:  117/74 118/67 (!) 140/77   Pulse: 63 98 77 97   Resp: 22  24 16   Temp: 97.5 °F (36.4 °C)  97.4 °F (36.3 °C) 97.1 °F (36.2 °C)   TempSrc: Axillary  Oral    SpO2: 95%  97% 100%   Weight:       Height:                                                  BP Readings from Last 3 Encounters:   06/20/24 (!) 140/77   02/02/24 112/78   07/24/23 (!) 148/88       NPO Status: Time of last liquid consumption: 2359                        Time of last solid consumption: 2359                        Date of last liquid consumption: 06/19/24

## 2024-06-20 NOTE — PLAN OF CARE
4 Eyes Skin Assessment     NAME:  Juany Foy  YOB: 1935  MEDICAL RECORD NUMBER:  [id number]    The patient is being assessed for  Admission    I agree that at least one RN has performed a thorough Head to Toe Skin Assessment on the patient. ALL assessment sites listed below have been assessed.      Areas assessed by both nurses:    Head, Face, Ears, Shoulders, Back, Chest, Arms, Elbows, Hands, Sacrum. Buttock, Coccyx, Ischium, and Legs. Feet and Heels    Scattered bruising, R Hip Blister, L cheek abrasion.   Wound       Does the Patient have a Wound? Yes wound(s) were present on assessment. LDA wound assessment was Initiated and completed by RN   Right Hip Blister  Waldemar Prevention initiated by RN: Yes  Wound Care Orders initiated by RN: Yes    Pressure Injury (Stage 3,4, Unstageable, DTI, NWPT, and Complex wounds) if present, place Wound referral order by RN under : Yes    New Ostomies, if present place, Ostomy referral order under : No     Nurse 1 eSignature: Electronically signed by Lida Maddox RN on 6/20/24 at 3:26 AM EDT    **SHARE this note so that the co-signing nurse can place an eSignature**    Nurse 2 eSignature: Electronically signed by Ghulam Pickett RN on 6/22/24 at 5:15 AM EDT

## 2024-06-20 NOTE — PLAN OF CARE
Problem: Discharge Planning  Goal: Discharge to home or other facility with appropriate resources  6/20/2024 1213 by Tita Adler RN  Outcome: Progressing  6/20/2024 0343 by Lida Maddox RN  Outcome: Progressing     Problem: Skin/Tissue Integrity  Goal: Absence of new skin breakdown  Description: 1.  Monitor for areas of redness and/or skin breakdown  2.  Assess vascular access sites hourly  3.  Every 4-6 hours minimum:  Change oxygen saturation probe site  4.  Every 4-6 hours:  If on nasal continuous positive airway pressure, respiratory therapy assess nares and determine need for appliance change or resting period.  6/20/2024 1213 by Tita Adler RN  Outcome: Progressing  6/20/2024 0343 by Lida Maddox RN  Outcome: Progressing     Problem: Safety - Adult  Goal: Free from fall injury  6/20/2024 1213 by iTta Adler RN  Outcome: Progressing  6/20/2024 0343 by Lida Maddox RN  Outcome: Progressing     Problem: ABCDS Injury Assessment  Goal: Absence of physical injury  6/20/2024 1213 by Tita Adler RN  Outcome: Progressing  Flowsheets (Taken 6/20/2024 0807)  Absence of Physical Injury: Implement safety measures based on patient assessment  6/20/2024 0343 by Lida Maddox RN  Outcome: Progressing     Problem: Pain  Goal: Verbalizes/displays adequate comfort level or baseline comfort level  Outcome: Progressing

## 2024-06-20 NOTE — PERIOP NOTE
TRANSFER - OUT REPORT:    Verbal report given to DANIA Foley on Juany Foy  being transferred to 4th Floor [facility] for routine progression of patient care       Report consisted of patient’s Situation, Background, Assessment and   Recommendations(SBAR).     Information from the following report(s) Nurse Handoff Report, Index, Adult Overview, Surgery Report, Intake/Output, MAR, Cardiac Rhythm Afib, and Neuro Assessment was reviewed with the receiving nurse.    Lines:   Peripheral IV 06/17/24 Left;Posterior Wrist (Active)   Site Assessment Clean, dry & intact 06/20/24 1858   Line Status Normal saline locked 06/20/24 1858   Line Care Connections checked and tightened 06/20/24 1858   Phlebitis Assessment No symptoms 06/20/24 1858   Infiltration Assessment 0 06/20/24 1858   Alcohol Cap Used No 06/20/24 1858   Dressing Status Clean, dry & intact 06/20/24 1858   Dressing Type Transparent 06/20/24 1858       Peripheral IV 06/17/24 Right Antecubital (Active)   Site Assessment Clean, dry & intact 06/20/24 1858   Line Status Capped 06/20/24 1858   Line Care Connections checked and tightened 06/20/24 1858   Phlebitis Assessment No symptoms 06/20/24 1858   Infiltration Assessment 0 06/20/24 1858   Alcohol Cap Used No 06/20/24 1858   Dressing Status Clean, dry & intact 06/20/24 1858   Dressing Type Transparent 06/20/24 1858        Opportunity for questions and clarification was provided.      Patient transported with:   Monitor  O2 @ 6 liters  Registered Nurse    VTE prophylaxis orders have been written for Juany Foy.    Patient and family given floor number and nurses name.  Family updated re: pt status after security code verified.

## 2024-06-20 NOTE — PERIOP NOTE
Patient's BP was initially low upon arrival into PACU (80s/50s).   Placed in trendelenburg and BP came up on it's own.  Patient is edematous in her extremities, fingers. Her body is cool so it was hard to get an oxygen saturation reading. She has a probe on her R pinky finger that seems to  best.  Patient's oxygen sat is 91-92% on 6L HFNC.   Placed patient on a simple mask, as she was mouth breathing when asleep, and this improved her sats to 97-98% on only 4L. Sent to the floor on simple mask and discussed with receiving Rns.

## 2024-06-20 NOTE — PLAN OF CARE
Heparin stopped at 2am.   Progress Notes by Bacilio Oropeza DO at 04/27/17 09:38 AM     Author:  Bacilio Oropeza DO Service:  (none) Author Type:  Physician     Filed:  04/27/17 09:51 AM Encounter Date:  4/27/2017 Status:  Signed     :  Bacilio Oropeza DO (Physician)                  SUBJECTIVE:   Toyin Rodriguez is a 72 year old female comes in today for a cystourethroscopy[TT1.1T] for incomplete bladder emptying and suprapubic pressure had these symptoms for a while. + weak stream + intermittency + incomplete emptying.  Has had multiple UTIs recently. Referred by Dr. Delaney. Former patient of Dr. Jain and was treated in 2003 for urge incontinence with kegels and Detrol with good results. Has since stopped the detrol. UC (4/17/17) + E. coli (100,000CFU) and was treated with macrobid x 7 days.  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  The bladder mucosa was inspected throughout and was pink with no evidence of tumors, stones, or diverticula.  The ureteral orifices are normal in position and configuration.  The urethra was dilated from 24-3[TT1.1T]0[TT1.1M] Fr with VBS and the patient tolerated that well.       PLAN:[TT1.1T]  -Follow-up in 12 months if symptoms resolved.  Patient was instructed to follow up in 4 weeks if symptoms so not improve.  -Patient was given antibiotics and pyridium x 3 day to be taken following this procedure.[TT1.1M]    Electronically Signed by:    Bacilio Oropeza DO , 4/27/2017[TT1.1T]           Revision History        User Key Date/Time User Provider Type Action    > TT1.1 04/27/17 09:51 AM Bacilio Oropeza DO Physician Sign    C - Copied, M - Manual, T - Template

## 2024-06-20 NOTE — OP NOTE
Procedure: ERCP with sphincterotomy, sludge removal and stent placement    Indication: Choledocholithiasis    Date of Procedure: 6/20/2024    Patient profile: Refer to patient note in chart for documentation of history and physical.    Providers: Katherine Lopes MD    Referring MD: Dr. Barton    PCP: Santiago Harry MD    Medicines: MAC    Complication: No immediate complications.     Estimated blood loss: Minimal    Procedure: After the risks including, but not limited to medication reaction, infection, bleeding, perforation, missed lesions, benefits and alternatives of the procedure were discussed with the patient and all questions were answered, informed consent was obtained. The duodenoscope was passed under direct vision throughout the procedure, the patient's blood pressure pulse and oxygen saturation were monitored continuously. The scope was introduced through the mouth and advanced to the second part of duodenum. The endoscopy was performed without difficulty. The patient tolerated the procedure well. The ERCP was performed with the patient in the supine position.    Findings:     films obtained prior to start the procedure was normal.    The duodenoscope was introduced from the mouth advanced into the esophagus into the stomach, and into the level of the ampulla. The ampulla appeared abnormal. 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 Furthermore,the ampulla appears to be in the 3rd portion of the duodenum. Next, a Rx 44 sphincterotome with 0.035 inch semi-stiff guidewire was introduced, and cannulation of the bile duct was attempted using the wire first cannulation technique. This was unsuccessful. Cannulation was extremely challenging due to anatomy. The scope was in a long contorted position, making cannulation challenging. A Rx 39 sphincterotome and a 0.025in wire was used to help in cannulation of the CBD. This was challenging as well.  Ultimately, a needle-knife sphincterotomy with a 0.035in

## 2024-06-20 NOTE — ICUWATCH
RRT Clinical Rounding Nurse Progress Report      SUBJECTIVE: Patient assessed secondary to transfer from critical care.      Vitals:    06/19/24 1539 06/19/24 1951 06/19/24 2126 06/19/24 2349   BP: (!) 113/90 113/74 118/88 114/73   Pulse:   (!) 109 75   Resp:  15  15   Temp: 98.2 °F (36.8 °C) 97.4 °F (36.3 °C)  97.3 °F (36.3 °C)   TempSrc: Axillary Oral  Axillary   SpO2:  90%  97%   Weight:       Height:            DETERIORATION INDEX SCORE: 48    ASSESSMENT:  Pt in bed resting quietly on 3L NC, respirations bilaterally diminished. HR 93    PLAN:  {outreach followup:47390}    Sima Sheehan RN  [facility]: [phone]  [facility]: [phone]

## 2024-06-20 NOTE — OP NOTE
Procedure: Endoscopic Ultrasound (EUS)    Indication: Dilated CBD; abnormal LFT    Date of Procedure: 6/20/2024     Patient profile: Refer to patient note in chart for documentation of history and physical    Providers: Katherine Lopes MD    Referring MD: Dr. Barton    PCP: No primary care provider on file.    Medicines: General anesthesia    Complication: No immediate complications.     Estimated blood loss: Minimal    Procedure: After the risks including, but not limited to medication reaction, infection, bleeding, perforation, missed lesions, benefits and alternatives of the procedure were discussed with the patient and all questions were answered, informed consent was obtained. The gastroscope and the linear echoendoscope were passed under direct vision throughout the procedure, the patient's blood pressure pulse and oxygen saturation were monitored continuously. The scopes were introduced through the mouth and advanced to the second part of duodenum. The endoscopy was performed without difficulty. The patient tolerated the procedure well. The procedure was performed with the patient in the prone position.      Findings:   EUS Exam  Fuji [employer]etta Precision Ultrasound System was utilized for EUS imaging.     The linear echoendoscope (Olympus 180) was introduced from the mouth and advanced through the esophagus into the stomach to the level of the celiac axis.  The celiac axis was normal. There was no evidence of clinically significant celiac lymphadenopathy. The scope was then torqued counterclockwise to identify the pancreatic parenchyma. The pancreatic body and tail were normal. The pancreatic duct was identified. PD was normal    The scope was then introduced further toward the gastric antrum. The gallbladder was not seen consistent with patient history of prior cholecystectomy. The scope was then introduced into the duodenal bulb, where the common bile duct was identified. The CBD measured 10 mm. A 4-5 mm

## 2024-06-20 NOTE — ICUWATCH
RRT Clinical Rounding Nurse Progress Report      SUBJECTIVE: Called to assess patient secondary to transfer from critical care.      Vitals:    06/19/24 2349 06/20/24 0338 06/20/24 0803 06/20/24 0807   BP: 114/73 114/79  117/74   Pulse: 75 72 63 98   Resp: 15 19 22    Temp: 97.3 °F (36.3 °C) 97.3 °F (36.3 °C) 97.5 °F (36.4 °C)    TempSrc: Axillary Axillary Axillary    SpO2: 97% 97% 95%    Weight:       Height:              ASSESSMENT:  On arrival to room, I found patient to be  sitting in bedside recliner. Patient is alert and oriented X 4. Denies any pain or SOB. Respirations even and unlabored. Bilateral lung sounds clear on 2.5L NC. PT denies any needs or concerns at this time. Recent labs/notes/vitals reviewed and pt discussed with primary RN.    PLAN:  No concern per primary RN. Will follow per RRT Clinical Rounding Program protocol. Primary RN to call with concerns.    Ghulam Isbell RN  [place]: [phone]

## 2024-06-20 NOTE — CARE COORDINATION
CM met with patient to discuss therapy's recommendation for SNF at discharge. Patient verbalizes agreement. CM provided the list to patient. CM left VM on machine of daughter, Kriss, to look over with patient with instruction to choose priority of 3.   1500 CM found patient's daughter visiting in patient's room Preferences obtained for [facility] and [facility]. CM sent referrals.

## 2024-06-20 NOTE — WOUND CARE
Consulted for R hip blister.    R hip with 4x4cm, indurated 4cm, round/spherical fluid filled blister, skin intact, no drainage/odor. Recommend large pink silicone border foam dressing every other day/PRN for now until blisters eventually ruptures. When blister ruptures, okay to use xeroform, silicone border foam dressing every other day/PRN.    R abd fold excoriation, recommend to use zinc barrier cream.

## 2024-06-21 ENCOUNTER — APPOINTMENT (OUTPATIENT)
Dept: ULTRASOUND IMAGING | Age: 89
DRG: 308 | End: 2024-06-21
Payer: MEDICARE

## 2024-06-21 PROBLEM — I42.9 MYOCARDIOPATHY (HCC): Status: ACTIVE | Noted: 2024-06-21

## 2024-06-21 PROBLEM — R60.0 LOWER EXTREMITY EDEMA: Status: ACTIVE | Noted: 2024-06-21

## 2024-06-21 PROBLEM — J96.01 ACUTE RESPIRATORY FAILURE WITH HYPOXIA (HCC): Status: ACTIVE | Noted: 2024-06-21

## 2024-06-21 PROBLEM — J90 PLEURAL EFFUSION, RIGHT: Status: ACTIVE | Noted: 2024-06-21

## 2024-06-21 PROBLEM — R09.02 HYPOXIA: Status: RESOLVED | Noted: 2024-06-19 | Resolved: 2024-06-21

## 2024-06-21 LAB
ALBUMIN SERPL-MCNC: 2.7 G/DL (ref 3.2–4.6)
ALBUMIN/GLOB SERPL: 1.2 (ref 1–1.9)
ALP SERPL-CCNC: 186 U/L (ref 35–104)
ALT SERPL-CCNC: 255 U/L (ref 12–65)
ANION GAP SERPL CALC-SCNC: 16 MMOL/L (ref 9–18)
AST SERPL-CCNC: 117 U/L (ref 15–37)
BASOPHILS # BLD: 0 K/UL (ref 0–0.2)
BASOPHILS NFR BLD: 0 % (ref 0–2)
BILIRUB SERPL-MCNC: 1 MG/DL (ref 0–1.2)
BUN SERPL-MCNC: 38 MG/DL (ref 8–23)
CALCIUM SERPL-MCNC: 8.4 MG/DL (ref 8.8–10.2)
CHLORIDE SERPL-SCNC: 105 MMOL/L (ref 98–107)
CO2 SERPL-SCNC: 27 MMOL/L (ref 20–28)
CREAT SERPL-MCNC: 0.87 MG/DL (ref 0.6–1.1)
DIFFERENTIAL METHOD BLD: ABNORMAL
EOSINOPHIL # BLD: 0 K/UL (ref 0–0.8)
EOSINOPHIL NFR BLD: 0 % (ref 0.5–7.8)
ERYTHROCYTE [DISTWIDTH] IN BLOOD BY AUTOMATED COUNT: 14.6 % (ref 11.9–14.6)
GLOBULIN SER CALC-MCNC: 2.3 G/DL (ref 2.3–3.5)
GLUCOSE SERPL-MCNC: 192 MG/DL (ref 70–99)
HCT VFR BLD AUTO: 42.4 % (ref 35.8–46.3)
HGB BLD-MCNC: 13.4 G/DL (ref 11.7–15.4)
IMM GRANULOCYTES # BLD AUTO: 0.1 K/UL (ref 0–0.5)
IMM GRANULOCYTES NFR BLD AUTO: 1 % (ref 0–5)
LYMPHOCYTES # BLD: 0.4 K/UL (ref 0.5–4.6)
LYMPHOCYTES NFR BLD: 4 % (ref 13–44)
MAGNESIUM SERPL-MCNC: 1.8 MG/DL (ref 1.8–2.4)
MCH RBC QN AUTO: 32.6 PG (ref 26.1–32.9)
MCHC RBC AUTO-ENTMCNC: 31.6 G/DL (ref 31.4–35)
MCV RBC AUTO: 103.2 FL (ref 82–102)
MONOCYTES # BLD: 0.5 K/UL (ref 0.1–1.3)
MONOCYTES NFR BLD: 5 % (ref 4–12)
NEUTS SEG # BLD: 8.7 K/UL (ref 1.7–8.2)
NEUTS SEG NFR BLD: 90 % (ref 43–78)
NRBC # BLD: 0 K/UL (ref 0–0.2)
PLATELET # BLD AUTO: 110 K/UL (ref 150–450)
PMV BLD AUTO: 10.4 FL (ref 9.4–12.3)
POTASSIUM SERPL-SCNC: 3.3 MMOL/L (ref 3.5–5.1)
PROT SERPL-MCNC: 5.1 G/DL (ref 6.3–8.2)
RBC # BLD AUTO: 4.11 M/UL (ref 4.05–5.2)
SODIUM SERPL-SCNC: 147 MMOL/L (ref 136–145)
WBC # BLD AUTO: 9.7 K/UL (ref 4.3–11.1)

## 2024-06-21 PROCEDURE — 6370000000 HC RX 637 (ALT 250 FOR IP): Performed by: INTERNAL MEDICINE

## 2024-06-21 PROCEDURE — 85025 COMPLETE CBC W/AUTO DIFF WBC: CPT

## 2024-06-21 PROCEDURE — 1100000003 HC PRIVATE W/ TELEMETRY

## 2024-06-21 PROCEDURE — 0W993ZZ DRAINAGE OF RIGHT PLEURAL CAVITY, PERCUTANEOUS APPROACH: ICD-10-PCS | Performed by: INTERNAL MEDICINE

## 2024-06-21 PROCEDURE — 80053 COMPREHEN METABOLIC PANEL: CPT

## 2024-06-21 PROCEDURE — 99232 SBSQ HOSP IP/OBS MODERATE 35: CPT | Performed by: INTERNAL MEDICINE

## 2024-06-21 PROCEDURE — 6370000000 HC RX 637 (ALT 250 FOR IP): Performed by: FAMILY MEDICINE

## 2024-06-21 PROCEDURE — 93970 EXTREMITY STUDY: CPT

## 2024-06-21 PROCEDURE — 76604 US EXAM CHEST: CPT | Performed by: INTERNAL MEDICINE

## 2024-06-21 PROCEDURE — 93970 EXTREMITY STUDY: CPT | Performed by: RADIOLOGY

## 2024-06-21 PROCEDURE — 83735 ASSAY OF MAGNESIUM: CPT

## 2024-06-21 PROCEDURE — 6360000002 HC RX W HCPCS: Performed by: INTERNAL MEDICINE

## 2024-06-21 PROCEDURE — 36415 COLL VENOUS BLD VENIPUNCTURE: CPT

## 2024-06-21 PROCEDURE — 6360000002 HC RX W HCPCS: Performed by: NURSE PRACTITIONER

## 2024-06-21 PROCEDURE — 99233 SBSQ HOSP IP/OBS HIGH 50: CPT | Performed by: INTERNAL MEDICINE

## 2024-06-21 RX ORDER — HEPARIN SODIUM 1000 [USP'U]/ML
80 INJECTION, SOLUTION INTRAVENOUS; SUBCUTANEOUS PRN
Status: DISCONTINUED | OUTPATIENT
Start: 2024-06-21 | End: 2024-06-22 | Stop reason: SDUPTHER

## 2024-06-21 RX ORDER — METOPROLOL SUCCINATE 25 MG/1
50 TABLET, EXTENDED RELEASE ORAL 2 TIMES DAILY
Status: DISCONTINUED | OUTPATIENT
Start: 2024-06-21 | End: 2024-06-22

## 2024-06-21 RX ORDER — HEPARIN SODIUM 1000 [USP'U]/ML
40 INJECTION, SOLUTION INTRAVENOUS; SUBCUTANEOUS PRN
Status: DISCONTINUED | OUTPATIENT
Start: 2024-06-21 | End: 2024-06-22 | Stop reason: SDUPTHER

## 2024-06-21 RX ORDER — HEPARIN SODIUM 1000 [USP'U]/ML
80 INJECTION, SOLUTION INTRAVENOUS; SUBCUTANEOUS ONCE
Status: COMPLETED | OUTPATIENT
Start: 2024-06-21 | End: 2024-06-21

## 2024-06-21 RX ADMIN — METOPROLOL TARTRATE 50 MG: 25 TABLET, FILM COATED ORAL at 08:37

## 2024-06-21 RX ADMIN — DILTIAZEM HYDROCHLORIDE 120 MG: 120 CAPSULE, EXTENDED RELEASE ORAL at 08:36

## 2024-06-21 RX ADMIN — POLYETHYLENE GLYCOL 3350 17 G: 17 POWDER, FOR SOLUTION ORAL at 08:37

## 2024-06-21 RX ADMIN — METOPROLOL SUCCINATE 50 MG: 25 TABLET, EXTENDED RELEASE ORAL at 21:15

## 2024-06-21 RX ADMIN — FERROUS SULFATE TAB 325 MG (65 MG ELEMENTAL FE) 325 MG: 325 (65 FE) TAB at 14:29

## 2024-06-21 RX ADMIN — ESCITALOPRAM OXALATE 20 MG: 10 TABLET ORAL at 08:36

## 2024-06-21 RX ADMIN — LEVOTHYROXINE SODIUM 88 MCG: 0.09 TABLET ORAL at 07:11

## 2024-06-21 RX ADMIN — DOCUSATE SODIUM 50 MG AND SENNOSIDES 8.6 MG 2 TABLET: 8.6; 5 TABLET, FILM COATED ORAL at 21:19

## 2024-06-21 RX ADMIN — FUROSEMIDE 40 MG: 10 INJECTION, SOLUTION INTRAMUSCULAR; INTRAVENOUS at 21:16

## 2024-06-21 RX ADMIN — FUROSEMIDE 40 MG: 10 INJECTION, SOLUTION INTRAMUSCULAR; INTRAVENOUS at 08:37

## 2024-06-21 RX ADMIN — DOCUSATE SODIUM 50 MG AND SENNOSIDES 8.6 MG 2 TABLET: 8.6; 5 TABLET, FILM COATED ORAL at 08:36

## 2024-06-21 RX ADMIN — HEPARIN SODIUM 5400 UNITS: 1000 INJECTION INTRAVENOUS; SUBCUTANEOUS at 09:46

## 2024-06-21 ASSESSMENT — PAIN SCALES - GENERAL
PAINLEVEL_OUTOF10: 0

## 2024-06-21 NOTE — ICUWATCH
RRT Clinical Rounding Nurse Progress Report      SUBJECTIVE: Patient assessed secondary to transfer from critical care.      Vitals:    06/20/24 2005 06/20/24 2010 06/20/24 2015 06/20/24 2049   BP: 100/68 104/72  128/77   Pulse: 72 (!) 108 (!) 103 94   Resp:    15   Temp:    97.2 °F (36.2 °C)   TempSrc:    Oral   SpO2: 97% 97% 97% 92%   Weight:       Height:            DETERIORATION INDEX SCORE: 53    ASSESSMENT:  Pt is resting in bed, eyes closed at time of exam. Unlabored regular breathing on 4LNC. Telemetry monitor showing AF 's. Recently underwent ERCP, came back to her round oriented and following commands. Spoke with primary RN, no major complaints. VSS.    PLAN:  Will follow per RRT Clinical Rounding Program protocol.    Abad Nair RN  [facility]: [phone]  [facility]: [phone]

## 2024-06-21 NOTE — CARE COORDINATION
CM reviewed clinical record. Cardiology and Pulm following. Oral Cardizem started. Pulm evaluated for tap. Not enough fluid visualized for tap. ERCP 6/20 revealed gallstones. Stent placed. Repeat ERCP 6-8 wks.Remains on 4 lpm. No home setup.  CM met with granddaughter, at bedside and informed of bed offer and initiation of prior auth.   Pt resting with eyes closed.   SNF first choice, [facility] made bed offer. CM accepted and selected in [facility]. CM intiated prior auth with [facility] [phone].   CM faxed clinicals to [phone]. Pending Auth # [id number]  CM met with granddaughter, at bedside and informed of bed offer and initiation of prior auth.   Pt resting with eyes closed.

## 2024-06-21 NOTE — ANESTHESIA POSTPROCEDURE EVALUATION
Department of Anesthesiology  Postprocedure Note    Patient: Juany Foy  MRN: [id number]  YOB: 1935  Date of evaluation: 6/20/2024    Procedure Summary       Date: 06/20/24 Room / Location: [facility] ENDO FLOURO 1 / [facility] ENDOSCOPY    Anesthesia Start: 1724 Anesthesia Stop: 1824    Procedures:       ESOPHAGOGASTRODUODENOSCOPY ULTRASOUND; make sure heparin drip held at midnight      ENDOSCOPIC RETROGRADE CHOLANGIOPANCREATOGRAPHY/SPHINCTEROTOMY/STENT PLACEMENT (Upper GI Region) Diagnosis:       Elevated bilirubin      Elevated LFTs      (Elevated bilirubin [R17])      (Elevated LFTs [R79.89])    Surgeons: Katherine Lopes MD Responsible Provider: Mary Hines MD    Anesthesia Type: TIVA ASA Status: 4            Anesthesia Type: TIVA    Zenaida Phase I: Zenaida Score: 8    Zenaida Phase II:      Anesthesia Post Evaluation    Patient location during evaluation: PACU  Patient participation: complete - patient participated  Level of consciousness: sleepy but conscious, responsive to verbal stimuli and responsive to light touch  Airway patency: patent  Nausea & Vomiting: no nausea and no vomiting  Cardiovascular status: blood pressure returned to baseline (afib)  Respiratory status: spontaneous ventilation and face mask  Comments: Prolonged PACU stay for delayed emergence and hypoxia. Patient presented to preop on 3L HFNC. In PACU, she was initially weaned off the face mask to NC after becoming more arousable. O2 sats mid 90s when awake but would drift to high 80s-low 90s when asleep. Patient appears to be primarily a mouth breather, so was switched back to 4L simple face mask and was able to maintain O2 sats mid to high 90s while both awake and asleep. Patient to return to her original room on the [facility] floor with continuous O2 monitoring  Pain management: adequate    No notable events documented.

## 2024-06-21 NOTE — OP NOTE
PROCEDURE:  DIAGNOSTIC ULTRASOUND OF CHEST    DIAGNOSIS:  right PLEURAL EFFUSION    CHEST ULTRASOUND FINDINGS:    A Turbo-M, Sonosite ultrasound with a 5-16 mHz probe was used to image the chest and localize the pleural effusion on the right chest.    A small anechoic space was seen on the right chest consistent with an uncomplicated pleural effusion.  While technically enough fluid to be drained, there is a portion of lung that occludes the necessary entry window.   Thoracentesis will not be attempted.       Robinson Mccall MD

## 2024-06-21 NOTE — ICUWATCH
RRT Clinical Rounding Nurse Update    Vitals:    06/21/24 0344 06/21/24 0807 06/21/24 1135 06/21/24 1702   BP: 122/77 (!) 124/91 127/76 128/79   Pulse: 69 (!) 107 (!) 119 (!) 120   Resp: 18 18 18 16   Temp: 97 °F (36.1 °C) (!) 96.1 °F (35.6 °C) 96.8 °F (36 °C) (!) 96.4 °F (35.8 °C)   TempSrc: Temporal Temporal Temporal Temporal   SpO2: 97% 92% 94% 98%   Weight:       Height:            ASSESSMENT:  Previous outreach assessment was reviewed. There have been no significant clinical changes since the completion of the last dated Outreach assessment. No concerns per primary RN.    PLAN:  Will discharge from RRT Clinical Rounding Program per protocol. Please call if needed.    Ghulam Isbell RN  [place]: [phone]

## 2024-06-21 NOTE — ICUWATCH
RRT Clinical Rounding Nurse Progress Report      SUBJECTIVE: Called to assess patient secondary to transfer from critical care.      Vitals:    06/20/24 2049 06/20/24 2358 06/21/24 0344 06/21/24 0807   BP: 128/77 124/78 122/77 (!) 124/91   Pulse: 94 89 69 (!) 107   Resp: 15 17 18 18   Temp: 97.2 °F (36.2 °C) 97.4 °F (36.3 °C) 97 °F (36.1 °C) (!) 96.1 °F (35.6 °C)   TempSrc: Oral Temporal Temporal Temporal   SpO2: 92%  97% 92%   Weight:       Height:              ASSESSMENT:  On arrival to room, I found patient to be resting in bed quietly. Patient is alert and oriented X 4. Denies any pain or SOB. Respirations even and unlabored. Bilateral lung sounds diminished on 4L NC. PT denies any needs or concerns at this time. Recent labs/notes/vitals reviewed and pt discussed with primary RN.    PLAN:  No concern per primary RN. Will follow per RRT Clinical Rounding Program protocol. Primary RN to call with concerns.    Heparin gtt not infusing on exam. Discussed resuming heparin with GI via secure message who stated they are comfortable with resuming heparin or Eliquis today from their standpoint. Further Anticoagulation discussed with Cardiology and Hospitalist services.    Ghulam Isbell RN  [place]: [phone]

## 2024-06-21 NOTE — ICUWATCH
RRT Clinical Rounding Nurse Update    Vitals:    06/20/24 2015 06/20/24 2049 06/20/24 2358 06/21/24 0344   BP:  128/77 124/78 122/77   Pulse: (!) 103 94 89 69   Resp:  15 17 18   Temp:  97.2 °F (36.2 °C) 97.4 °F (36.3 °C) 97 °F (36.1 °C)   TempSrc:  Oral Temporal Temporal   SpO2: 97% 92%  97%   Weight:       Height:            DETERIORATION INDEX SCORE: 51    ASSESSMENT:  Previous outreach assessment was reviewed. There have been no significant changes since previous assessment. VSS    PLAN:  Will follow per RRT Clinical Rounding Program protocol.    Abad Nair RN  [facility]: [phone]  [facility] [place]: [phone]

## 2024-06-21 NOTE — MANAGEMENT PLAN
Pt with Afib and new PE. Was on IV Heparin but this was held on 6/20/24 at [facility] for planned ERCP and was not resumed post-procedure.     ICU RN reached out to this NP about restarting AC as Pt still in Afib (+PE). RN reached out to GI and they are OK to resume IV Heparin and/or start Eliquis. No further GI procedures planned.     Unknown if Pulm planning any procedures ??     Will restart IV Heparin gtt at this time. Plan WB bolus as Pt off AC >24hrs. Then resume PE dosing.     Per chart review -- Pt seen by Heme/Onc and not felt to have Eliquis failure and they recommended resuming Eliquis when appropriate.     If Pulm not planning any procedures, Pt could transition to PO OAC for PE/Afib management.         Arlette Terrell, MGC

## 2024-06-22 LAB
BACTERIA SPEC CULT: NORMAL
BACTERIA SPEC CULT: NORMAL
SERVICE CMNT-IMP: NORMAL
SERVICE CMNT-IMP: NORMAL
SODIUM SERPL-SCNC: 147 MMOL/L (ref 136–145)

## 2024-06-22 PROCEDURE — 6370000000 HC RX 637 (ALT 250 FOR IP): Performed by: INTERNAL MEDICINE

## 2024-06-22 PROCEDURE — 2500000003 HC RX 250 WO HCPCS: Performed by: CASE MANAGER/CARE COORDINATOR

## 2024-06-22 PROCEDURE — 6360000002 HC RX W HCPCS: Performed by: CASE MANAGER/CARE COORDINATOR

## 2024-06-22 PROCEDURE — 6370000000 HC RX 637 (ALT 250 FOR IP): Performed by: FAMILY MEDICINE

## 2024-06-22 PROCEDURE — 84295 ASSAY OF SERUM SODIUM: CPT

## 2024-06-22 PROCEDURE — 1100000003 HC PRIVATE W/ TELEMETRY

## 2024-06-22 PROCEDURE — 2500000003 HC RX 250 WO HCPCS: Performed by: INTERNAL MEDICINE

## 2024-06-22 PROCEDURE — 6360000002 HC RX W HCPCS: Performed by: INTERNAL MEDICINE

## 2024-06-22 PROCEDURE — 36415 COLL VENOUS BLD VENIPUNCTURE: CPT

## 2024-06-22 PROCEDURE — 51798 US URINE CAPACITY MEASURE: CPT

## 2024-06-22 PROCEDURE — 6370000000 HC RX 637 (ALT 250 FOR IP): Performed by: CASE MANAGER/CARE COORDINATOR

## 2024-06-22 PROCEDURE — 99232 SBSQ HOSP IP/OBS MODERATE 35: CPT | Performed by: INTERNAL MEDICINE

## 2024-06-22 RX ORDER — MAGNESIUM SULFATE IN WATER 40 MG/ML
2000 INJECTION, SOLUTION INTRAVENOUS ONCE
Status: COMPLETED | OUTPATIENT
Start: 2024-06-22 | End: 2024-06-22

## 2024-06-22 RX ORDER — METOPROLOL TARTRATE 1 MG/ML
5 INJECTION, SOLUTION INTRAVENOUS ONCE
Status: COMPLETED | OUTPATIENT
Start: 2024-06-22 | End: 2024-06-22

## 2024-06-22 RX ORDER — METOPROLOL TARTRATE 1 MG/ML
5 INJECTION, SOLUTION INTRAVENOUS EVERY 6 HOURS PRN
Status: DISCONTINUED | OUTPATIENT
Start: 2024-06-22 | End: 2024-07-09 | Stop reason: HOSPADM

## 2024-06-22 RX ORDER — TAMSULOSIN HYDROCHLORIDE 0.4 MG/1
0.4 CAPSULE ORAL DAILY
Status: DISCONTINUED | OUTPATIENT
Start: 2024-06-22 | End: 2024-07-04

## 2024-06-22 RX ORDER — POTASSIUM CHLORIDE 20 MEQ/1
40 TABLET, EXTENDED RELEASE ORAL EVERY 4 HOURS
Status: COMPLETED | OUTPATIENT
Start: 2024-06-22 | End: 2024-06-22

## 2024-06-22 RX ORDER — METOPROLOL TARTRATE 50 MG/1
50 TABLET, FILM COATED ORAL EVERY 6 HOURS
Status: DISCONTINUED | OUTPATIENT
Start: 2024-06-22 | End: 2024-06-24

## 2024-06-22 RX ADMIN — METOPROLOL TARTRATE 5 MG: 5 INJECTION INTRAVENOUS at 10:54

## 2024-06-22 RX ADMIN — TAMSULOSIN HYDROCHLORIDE 0.4 MG: 0.4 CAPSULE ORAL at 15:18

## 2024-06-22 RX ADMIN — MAGNESIUM SULFATE HEPTAHYDRATE 2000 MG: 40 INJECTION, SOLUTION INTRAVENOUS at 01:46

## 2024-06-22 RX ADMIN — APIXABAN 10 MG: 5 TABLET, FILM COATED ORAL at 08:48

## 2024-06-22 RX ADMIN — POTASSIUM CHLORIDE 40 MEQ: 1500 TABLET, EXTENDED RELEASE ORAL at 01:41

## 2024-06-22 RX ADMIN — LEVOTHYROXINE SODIUM 88 MCG: 0.09 TABLET ORAL at 05:39

## 2024-06-22 RX ADMIN — METOPROLOL TARTRATE 50 MG: 50 TABLET, FILM COATED ORAL at 21:36

## 2024-06-22 RX ADMIN — METOPROLOL TARTRATE 50 MG: 50 TABLET, FILM COATED ORAL at 15:18

## 2024-06-22 RX ADMIN — DOCUSATE SODIUM 50 MG AND SENNOSIDES 8.6 MG 2 TABLET: 8.6; 5 TABLET, FILM COATED ORAL at 08:46

## 2024-06-22 RX ADMIN — POTASSIUM CHLORIDE 40 MEQ: 1500 TABLET, EXTENDED RELEASE ORAL at 05:39

## 2024-06-22 RX ADMIN — ESCITALOPRAM OXALATE 20 MG: 10 TABLET ORAL at 08:46

## 2024-06-22 RX ADMIN — FERROUS SULFATE TAB 325 MG (65 MG ELEMENTAL FE) 325 MG: 325 (65 FE) TAB at 12:31

## 2024-06-22 RX ADMIN — DOCUSATE SODIUM 50 MG AND SENNOSIDES 8.6 MG 2 TABLET: 8.6; 5 TABLET, FILM COATED ORAL at 21:36

## 2024-06-22 RX ADMIN — FUROSEMIDE 40 MG: 10 INJECTION, SOLUTION INTRAMUSCULAR; INTRAVENOUS at 21:37

## 2024-06-22 RX ADMIN — METOROPROLOL TARTRATE 5 MG: 5 INJECTION, SOLUTION INTRAVENOUS at 01:41

## 2024-06-22 RX ADMIN — APIXABAN 10 MG: 5 TABLET, FILM COATED ORAL at 21:36

## 2024-06-22 RX ADMIN — POLYETHYLENE GLYCOL 3350 17 G: 17 POWDER, FOR SOLUTION ORAL at 08:47

## 2024-06-22 RX ADMIN — FUROSEMIDE 40 MG: 10 INJECTION, SOLUTION INTRAMUSCULAR; INTRAVENOUS at 08:47

## 2024-06-22 RX ADMIN — METOPROLOL SUCCINATE 50 MG: 25 TABLET, EXTENDED RELEASE ORAL at 08:47

## 2024-06-22 RX ADMIN — EMPAGLIFLOZIN 10 MG: 10 TABLET, FILM COATED ORAL at 10:02

## 2024-06-22 ASSESSMENT — PAIN SCALES - GENERAL
PAINLEVEL_OUTOF10: 0

## 2024-06-22 NOTE — CARE COORDINATION
CM contacted by Medina at [facility] to inform prior auth is approved.   Transition of care to [facility] when medically stable.

## 2024-06-23 LAB
ANION GAP SERPL CALC-SCNC: 9 MMOL/L (ref 9–18)
BASOPHILS # BLD: 0 K/UL (ref 0–0.2)
BASOPHILS NFR BLD: 0 % (ref 0–2)
BUN SERPL-MCNC: 26 MG/DL (ref 8–23)
CALCIUM SERPL-MCNC: 8.6 MG/DL (ref 8.8–10.2)
CHLORIDE SERPL-SCNC: 100 MMOL/L (ref 98–107)
CO2 SERPL-SCNC: 40 MMOL/L (ref 20–28)
CREAT SERPL-MCNC: 0.75 MG/DL (ref 0.6–1.1)
DIFFERENTIAL METHOD BLD: ABNORMAL
EOSINOPHIL # BLD: 0 K/UL (ref 0–0.8)
EOSINOPHIL NFR BLD: 0 % (ref 0.5–7.8)
ERYTHROCYTE [DISTWIDTH] IN BLOOD BY AUTOMATED COUNT: 14.6 % (ref 11.9–14.6)
GLUCOSE SERPL-MCNC: 198 MG/DL (ref 70–99)
HCT VFR BLD AUTO: 43.4 % (ref 35.8–46.3)
HGB BLD-MCNC: 13.4 G/DL (ref 11.7–15.4)
IMM GRANULOCYTES # BLD AUTO: 0.1 K/UL (ref 0–0.5)
IMM GRANULOCYTES NFR BLD AUTO: 1 % (ref 0–5)
LYMPHOCYTES # BLD: 0.4 K/UL (ref 0.5–4.6)
LYMPHOCYTES NFR BLD: 5 % (ref 13–44)
MAGNESIUM SERPL-MCNC: 1.9 MG/DL (ref 1.8–2.4)
MCH RBC QN AUTO: 31.8 PG (ref 26.1–32.9)
MCHC RBC AUTO-ENTMCNC: 30.9 G/DL (ref 31.4–35)
MCV RBC AUTO: 102.8 FL (ref 82–102)
MONOCYTES # BLD: 0.6 K/UL (ref 0.1–1.3)
MONOCYTES NFR BLD: 7 % (ref 4–12)
NEUTS SEG # BLD: 7.1 K/UL (ref 1.7–8.2)
NEUTS SEG NFR BLD: 87 % (ref 43–78)
NRBC # BLD: 0 K/UL (ref 0–0.2)
PLATELET # BLD AUTO: 107 K/UL (ref 150–450)
PMV BLD AUTO: 10.3 FL (ref 9.4–12.3)
POTASSIUM SERPL-SCNC: 3.3 MMOL/L (ref 3.5–5.1)
POTASSIUM SERPL-SCNC: 4.1 MMOL/L (ref 3.5–5.1)
RBC # BLD AUTO: 4.22 M/UL (ref 4.05–5.2)
SODIUM SERPL-SCNC: 147 MMOL/L (ref 136–145)
SODIUM SERPL-SCNC: 149 MMOL/L (ref 136–145)
WBC # BLD AUTO: 8.2 K/UL (ref 4.3–11.1)

## 2024-06-23 PROCEDURE — 6360000002 HC RX W HCPCS: Performed by: NURSE PRACTITIONER

## 2024-06-23 PROCEDURE — 6370000000 HC RX 637 (ALT 250 FOR IP): Performed by: INTERNAL MEDICINE

## 2024-06-23 PROCEDURE — 6360000002 HC RX W HCPCS: Performed by: INTERNAL MEDICINE

## 2024-06-23 PROCEDURE — 84132 ASSAY OF SERUM POTASSIUM: CPT

## 2024-06-23 PROCEDURE — 6370000000 HC RX 637 (ALT 250 FOR IP): Performed by: FAMILY MEDICINE

## 2024-06-23 PROCEDURE — 6370000000 HC RX 637 (ALT 250 FOR IP): Performed by: CASE MANAGER/CARE COORDINATOR

## 2024-06-23 PROCEDURE — 6360000002 HC RX W HCPCS: Performed by: CASE MANAGER/CARE COORDINATOR

## 2024-06-23 PROCEDURE — 2500000003 HC RX 250 WO HCPCS: Performed by: INTERNAL MEDICINE

## 2024-06-23 PROCEDURE — 83735 ASSAY OF MAGNESIUM: CPT

## 2024-06-23 PROCEDURE — 84295 ASSAY OF SERUM SODIUM: CPT

## 2024-06-23 PROCEDURE — 2580000003 HC RX 258: Performed by: INTERNAL MEDICINE

## 2024-06-23 PROCEDURE — 36415 COLL VENOUS BLD VENIPUNCTURE: CPT

## 2024-06-23 PROCEDURE — 1100000003 HC PRIVATE W/ TELEMETRY

## 2024-06-23 PROCEDURE — 80048 BASIC METABOLIC PNL TOTAL CA: CPT

## 2024-06-23 PROCEDURE — 85025 COMPLETE CBC W/AUTO DIFF WBC: CPT

## 2024-06-23 PROCEDURE — 99232 SBSQ HOSP IP/OBS MODERATE 35: CPT | Performed by: INTERNAL MEDICINE

## 2024-06-23 RX ORDER — MAGNESIUM SULFATE IN WATER 40 MG/ML
2000 INJECTION, SOLUTION INTRAVENOUS PRN
Status: DISCONTINUED | OUTPATIENT
Start: 2024-06-23 | End: 2024-07-09 | Stop reason: HOSPADM

## 2024-06-23 RX ORDER — POTASSIUM CHLORIDE 20 MEQ/1
40 TABLET, EXTENDED RELEASE ORAL EVERY 4 HOURS
Status: COMPLETED | OUTPATIENT
Start: 2024-06-23 | End: 2024-06-23

## 2024-06-23 RX ORDER — MAGNESIUM SULFATE IN WATER 40 MG/ML
2000 INJECTION, SOLUTION INTRAVENOUS ONCE
Status: COMPLETED | OUTPATIENT
Start: 2024-06-23 | End: 2024-06-23

## 2024-06-23 RX ORDER — SPIRONOLACTONE 25 MG/1
25 TABLET ORAL DAILY
Status: DISCONTINUED | OUTPATIENT
Start: 2024-06-23 | End: 2024-07-04

## 2024-06-23 RX ORDER — DIGOXIN 0.25 MG/ML
125 INJECTION INTRAMUSCULAR; INTRAVENOUS EVERY 6 HOURS
Status: DISCONTINUED | OUTPATIENT
Start: 2024-06-23 | End: 2024-06-23

## 2024-06-23 RX ORDER — DEXTROSE MONOHYDRATE 50 MG/ML
INJECTION, SOLUTION INTRAVENOUS CONTINUOUS
Status: ACTIVE | OUTPATIENT
Start: 2024-06-23 | End: 2024-06-24

## 2024-06-23 RX ORDER — DIGOXIN 125 MCG
62.5 TABLET ORAL DAILY
Status: DISCONTINUED | OUTPATIENT
Start: 2024-06-24 | End: 2024-06-25

## 2024-06-23 RX ORDER — DIGOXIN 0.25 MG/ML
250 INJECTION INTRAMUSCULAR; INTRAVENOUS EVERY 6 HOURS
Status: DISCONTINUED | OUTPATIENT
Start: 2024-06-23 | End: 2024-06-23

## 2024-06-23 RX ORDER — DIGOXIN 0.25 MG/ML
125 INJECTION INTRAMUSCULAR; INTRAVENOUS EVERY 6 HOURS
Status: COMPLETED | OUTPATIENT
Start: 2024-06-23 | End: 2024-06-23

## 2024-06-23 RX ORDER — POTASSIUM CHLORIDE 7.45 MG/ML
10 INJECTION INTRAVENOUS PRN
Status: DISCONTINUED | OUTPATIENT
Start: 2024-06-23 | End: 2024-07-09 | Stop reason: HOSPADM

## 2024-06-23 RX ORDER — POTASSIUM CHLORIDE 20 MEQ/1
40 TABLET, EXTENDED RELEASE ORAL PRN
Status: DISCONTINUED | OUTPATIENT
Start: 2024-06-23 | End: 2024-07-09 | Stop reason: HOSPADM

## 2024-06-23 RX ORDER — DIGOXIN 0.25 MG/ML
250 INJECTION INTRAMUSCULAR; INTRAVENOUS
Status: COMPLETED | OUTPATIENT
Start: 2024-06-23 | End: 2024-06-23

## 2024-06-23 RX ADMIN — FERROUS SULFATE TAB 325 MG (65 MG ELEMENTAL FE) 325 MG: 325 (65 FE) TAB at 12:04

## 2024-06-23 RX ADMIN — APIXABAN 10 MG: 5 TABLET, FILM COATED ORAL at 21:33

## 2024-06-23 RX ADMIN — METOPROLOL TARTRATE 50 MG: 50 TABLET, FILM COATED ORAL at 15:30

## 2024-06-23 RX ADMIN — DEXTROSE MONOHYDRATE: 50 INJECTION, SOLUTION INTRAVENOUS at 16:07

## 2024-06-23 RX ADMIN — LEVOTHYROXINE SODIUM 88 MCG: 0.09 TABLET ORAL at 05:08

## 2024-06-23 RX ADMIN — METOPROLOL TARTRATE 50 MG: 50 TABLET, FILM COATED ORAL at 09:26

## 2024-06-23 RX ADMIN — POTASSIUM CHLORIDE 40 MEQ: 1500 TABLET, EXTENDED RELEASE ORAL at 05:52

## 2024-06-23 RX ADMIN — METOPROLOL TARTRATE 5 MG: 5 INJECTION INTRAVENOUS at 05:09

## 2024-06-23 RX ADMIN — DOCUSATE SODIUM 50 MG AND SENNOSIDES 8.6 MG 2 TABLET: 8.6; 5 TABLET, FILM COATED ORAL at 09:26

## 2024-06-23 RX ADMIN — DIGOXIN 250 MCG: 0.25 INJECTION INTRAMUSCULAR; INTRAVENOUS at 06:47

## 2024-06-23 RX ADMIN — DOCUSATE SODIUM 50 MG AND SENNOSIDES 8.6 MG 2 TABLET: 8.6; 5 TABLET, FILM COATED ORAL at 21:33

## 2024-06-23 RX ADMIN — MAGNESIUM SULFATE HEPTAHYDRATE 2000 MG: 40 INJECTION, SOLUTION INTRAVENOUS at 08:17

## 2024-06-23 RX ADMIN — POTASSIUM CHLORIDE 40 MEQ: 1500 TABLET, EXTENDED RELEASE ORAL at 09:25

## 2024-06-23 RX ADMIN — METOPROLOL TARTRATE 50 MG: 50 TABLET, FILM COATED ORAL at 02:54

## 2024-06-23 RX ADMIN — EMPAGLIFLOZIN 10 MG: 10 TABLET, FILM COATED ORAL at 09:26

## 2024-06-23 RX ADMIN — ESCITALOPRAM OXALATE 20 MG: 10 TABLET ORAL at 09:26

## 2024-06-23 RX ADMIN — APIXABAN 10 MG: 5 TABLET, FILM COATED ORAL at 09:25

## 2024-06-23 RX ADMIN — DIGOXIN 125 MCG: 0.25 INJECTION INTRAMUSCULAR; INTRAVENOUS at 18:15

## 2024-06-23 RX ADMIN — SPIRONOLACTONE 25 MG: 25 TABLET ORAL at 09:25

## 2024-06-23 RX ADMIN — TAMSULOSIN HYDROCHLORIDE 0.4 MG: 0.4 CAPSULE ORAL at 09:26

## 2024-06-23 RX ADMIN — DIGOXIN 125 MCG: 0.25 INJECTION INTRAMUSCULAR; INTRAVENOUS at 12:04

## 2024-06-23 RX ADMIN — METOPROLOL TARTRATE 50 MG: 50 TABLET, FILM COATED ORAL at 21:33

## 2024-06-23 ASSESSMENT — PAIN SCALES - GENERAL
PAINLEVEL_OUTOF10: 0

## 2024-06-24 ENCOUNTER — TELEPHONE (OUTPATIENT)
Dept: GASTROENTEROLOGY | Age: 89
End: 2024-06-24

## 2024-06-24 ENCOUNTER — TELEPHONE (OUTPATIENT)
Age: 89
End: 2024-06-24

## 2024-06-24 ENCOUNTER — PREP FOR PROCEDURE (OUTPATIENT)
Dept: GASTROENTEROLOGY | Age: 89
End: 2024-06-24

## 2024-06-24 ENCOUNTER — APPOINTMENT (OUTPATIENT)
Dept: GENERAL RADIOLOGY | Age: 89
DRG: 308 | End: 2024-06-24
Payer: MEDICARE

## 2024-06-24 LAB
ALBUMIN SERPL-MCNC: 2.5 G/DL (ref 3.2–4.6)
ALBUMIN/GLOB SERPL: 1 (ref 1–1.9)
ALP SERPL-CCNC: 154 U/L (ref 35–104)
ALT SERPL-CCNC: 108 U/L (ref 12–65)
ANION GAP SERPL CALC-SCNC: 6 MMOL/L (ref 9–18)
AST SERPL-CCNC: 29 U/L (ref 15–37)
BASOPHILS # BLD: 0 K/UL (ref 0–0.2)
BASOPHILS NFR BLD: 0 % (ref 0–2)
BILIRUB DIRECT SERPL-MCNC: 0.3 MG/DL (ref 0–0.4)
BILIRUB SERPL-MCNC: 0.7 MG/DL (ref 0–1.2)
BUN SERPL-MCNC: 24 MG/DL (ref 8–23)
CALCIUM SERPL-MCNC: 9 MG/DL (ref 8.8–10.2)
CHLORIDE SERPL-SCNC: 99 MMOL/L (ref 98–107)
CO2 SERPL-SCNC: 39 MMOL/L (ref 20–28)
CREAT SERPL-MCNC: 0.65 MG/DL (ref 0.6–1.1)
DIFFERENTIAL METHOD BLD: ABNORMAL
EOSINOPHIL # BLD: 0 K/UL (ref 0–0.8)
EOSINOPHIL NFR BLD: 0 % (ref 0.5–7.8)
ERYTHROCYTE [DISTWIDTH] IN BLOOD BY AUTOMATED COUNT: 14.2 % (ref 11.9–14.6)
GLOBULIN SER CALC-MCNC: 2.5 G/DL (ref 2.3–3.5)
GLUCOSE SERPL-MCNC: 170 MG/DL (ref 70–99)
HCT VFR BLD AUTO: 45.1 % (ref 35.8–46.3)
HGB BLD-MCNC: 13.8 G/DL (ref 11.7–15.4)
IMM GRANULOCYTES # BLD AUTO: 0.1 K/UL (ref 0–0.5)
IMM GRANULOCYTES NFR BLD AUTO: 1 % (ref 0–5)
LYMPHOCYTES # BLD: 0.6 K/UL (ref 0.5–4.6)
LYMPHOCYTES NFR BLD: 7 % (ref 13–44)
MAGNESIUM SERPL-MCNC: 2.4 MG/DL (ref 1.8–2.4)
MCH RBC QN AUTO: 31.9 PG (ref 26.1–32.9)
MCHC RBC AUTO-ENTMCNC: 30.6 G/DL (ref 31.4–35)
MCV RBC AUTO: 104.2 FL (ref 82–102)
MONOCYTES # BLD: 0.6 K/UL (ref 0.1–1.3)
MONOCYTES NFR BLD: 6 % (ref 4–12)
NEUTS SEG # BLD: 7.4 K/UL (ref 1.7–8.2)
NEUTS SEG NFR BLD: 86 % (ref 43–78)
NRBC # BLD: 0 K/UL (ref 0–0.2)
PLATELET # BLD AUTO: 140 K/UL (ref 150–450)
PMV BLD AUTO: 10.3 FL (ref 9.4–12.3)
POTASSIUM SERPL-SCNC: 4.7 MMOL/L (ref 3.5–5.1)
PROT SERPL-MCNC: 5 G/DL (ref 6.3–8.2)
RBC # BLD AUTO: 4.33 M/UL (ref 4.05–5.2)
SODIUM SERPL-SCNC: 142 MMOL/L (ref 136–145)
SODIUM SERPL-SCNC: 143 MMOL/L (ref 136–145)
SODIUM SERPL-SCNC: 143 MMOL/L (ref 136–145)
SODIUM SERPL-SCNC: 147 MMOL/L (ref 136–145)
WBC # BLD AUTO: 8.7 K/UL (ref 4.3–11.1)

## 2024-06-24 PROCEDURE — 2500000003 HC RX 250 WO HCPCS: Performed by: INTERNAL MEDICINE

## 2024-06-24 PROCEDURE — 6370000000 HC RX 637 (ALT 250 FOR IP): Performed by: INTERNAL MEDICINE

## 2024-06-24 PROCEDURE — 99232 SBSQ HOSP IP/OBS MODERATE 35: CPT | Performed by: INTERNAL MEDICINE

## 2024-06-24 PROCEDURE — 6360000002 HC RX W HCPCS: Performed by: INTERNAL MEDICINE

## 2024-06-24 PROCEDURE — 6370000000 HC RX 637 (ALT 250 FOR IP): Performed by: FAMILY MEDICINE

## 2024-06-24 PROCEDURE — 36415 COLL VENOUS BLD VENIPUNCTURE: CPT

## 2024-06-24 PROCEDURE — 71045 X-RAY EXAM CHEST 1 VIEW: CPT

## 2024-06-24 PROCEDURE — 1100000003 HC PRIVATE W/ TELEMETRY

## 2024-06-24 PROCEDURE — 85025 COMPLETE CBC W/AUTO DIFF WBC: CPT

## 2024-06-24 PROCEDURE — 80048 BASIC METABOLIC PNL TOTAL CA: CPT

## 2024-06-24 PROCEDURE — 6370000000 HC RX 637 (ALT 250 FOR IP): Performed by: NURSE PRACTITIONER

## 2024-06-24 PROCEDURE — 84295 ASSAY OF SERUM SODIUM: CPT

## 2024-06-24 PROCEDURE — 83735 ASSAY OF MAGNESIUM: CPT

## 2024-06-24 PROCEDURE — 80076 HEPATIC FUNCTION PANEL: CPT

## 2024-06-24 RX ORDER — SODIUM CHLORIDE 0.9 % (FLUSH) 0.9 %
5-40 SYRINGE (ML) INJECTION EVERY 12 HOURS SCHEDULED
Status: CANCELLED | OUTPATIENT
Start: 2024-06-24

## 2024-06-24 RX ORDER — SODIUM CHLORIDE 9 MG/ML
25 INJECTION, SOLUTION INTRAVENOUS PRN
Status: CANCELLED | OUTPATIENT
Start: 2024-06-24

## 2024-06-24 RX ORDER — SODIUM CHLORIDE 0.9 % (FLUSH) 0.9 %
5-40 SYRINGE (ML) INJECTION PRN
Status: CANCELLED | OUTPATIENT
Start: 2024-06-24

## 2024-06-24 RX ORDER — POTASSIUM CHLORIDE 20 MEQ/1
20 TABLET, EXTENDED RELEASE ORAL 2 TIMES DAILY WITH MEALS
Status: DISCONTINUED | OUTPATIENT
Start: 2024-06-24 | End: 2024-07-02

## 2024-06-24 RX ORDER — FUROSEMIDE 10 MG/ML
20 INJECTION INTRAMUSCULAR; INTRAVENOUS 2 TIMES DAILY
Status: DISCONTINUED | OUTPATIENT
Start: 2024-06-24 | End: 2024-06-25

## 2024-06-24 RX ORDER — METOPROLOL TARTRATE 100 MG/1
100 TABLET ORAL EVERY 6 HOURS
Status: DISCONTINUED | OUTPATIENT
Start: 2024-06-24 | End: 2024-06-25

## 2024-06-24 RX ADMIN — TAMSULOSIN HYDROCHLORIDE 0.4 MG: 0.4 CAPSULE ORAL at 08:47

## 2024-06-24 RX ADMIN — DIGOXIN 62.5 MCG: 125 TABLET ORAL at 08:40

## 2024-06-24 RX ADMIN — EMPAGLIFLOZIN 10 MG: 10 TABLET, FILM COATED ORAL at 08:40

## 2024-06-24 RX ADMIN — ESCITALOPRAM OXALATE 20 MG: 10 TABLET ORAL at 08:39

## 2024-06-24 RX ADMIN — APIXABAN 10 MG: 5 TABLET, FILM COATED ORAL at 08:39

## 2024-06-24 RX ADMIN — METOPROLOL TARTRATE 5 MG: 5 INJECTION INTRAVENOUS at 16:59

## 2024-06-24 RX ADMIN — FERROUS SULFATE TAB 325 MG (65 MG ELEMENTAL FE) 325 MG: 325 (65 FE) TAB at 12:38

## 2024-06-24 RX ADMIN — METOPROLOL TARTRATE 50 MG: 50 TABLET, FILM COATED ORAL at 03:27

## 2024-06-24 RX ADMIN — APIXABAN 10 MG: 5 TABLET, FILM COATED ORAL at 20:02

## 2024-06-24 RX ADMIN — METOPROLOL TARTRATE 100 MG: 100 TABLET, FILM COATED ORAL at 15:25

## 2024-06-24 RX ADMIN — FUROSEMIDE 20 MG: 10 INJECTION, SOLUTION INTRAMUSCULAR; INTRAVENOUS at 08:40

## 2024-06-24 RX ADMIN — SPIRONOLACTONE 25 MG: 25 TABLET ORAL at 08:40

## 2024-06-24 RX ADMIN — DOCUSATE SODIUM 50 MG AND SENNOSIDES 8.6 MG 2 TABLET: 8.6; 5 TABLET, FILM COATED ORAL at 20:02

## 2024-06-24 RX ADMIN — DOCUSATE SODIUM 50 MG AND SENNOSIDES 8.6 MG 2 TABLET: 8.6; 5 TABLET, FILM COATED ORAL at 08:38

## 2024-06-24 RX ADMIN — POTASSIUM CHLORIDE 20 MEQ: 1500 TABLET, EXTENDED RELEASE ORAL at 16:58

## 2024-06-24 RX ADMIN — POTASSIUM CHLORIDE 20 MEQ: 1500 TABLET, EXTENDED RELEASE ORAL at 08:39

## 2024-06-24 RX ADMIN — METOPROLOL TARTRATE 5 MG: 5 INJECTION INTRAVENOUS at 10:14

## 2024-06-24 RX ADMIN — METOPROLOL TARTRATE 100 MG: 100 TABLET, FILM COATED ORAL at 20:01

## 2024-06-24 RX ADMIN — METOPROLOL TARTRATE 5 MG: 5 INJECTION INTRAVENOUS at 23:11

## 2024-06-24 RX ADMIN — METOPROLOL TARTRATE 100 MG: 100 TABLET, FILM COATED ORAL at 08:39

## 2024-06-24 RX ADMIN — POLYETHYLENE GLYCOL 3350 17 G: 17 POWDER, FOR SOLUTION ORAL at 08:47

## 2024-06-24 RX ADMIN — FUROSEMIDE 20 MG: 10 INJECTION, SOLUTION INTRAMUSCULAR; INTRAVENOUS at 20:00

## 2024-06-24 RX ADMIN — LEVOTHYROXINE SODIUM 88 MCG: 0.09 TABLET ORAL at 05:39

## 2024-06-24 ASSESSMENT — PAIN SCALES - GENERAL: PAINLEVEL_OUTOF10: 0

## 2024-06-24 NOTE — TELEPHONE ENCOUNTER
----- Message from TIFFANI Burroughs sent at 6/21/2024  3:39 PM EDT -----  Would you be able to call patient or family to set up repeat ERCP in 6-8x weeks for stent; she may be in the hospital still on Monday. Thank you!

## 2024-06-24 NOTE — CARE COORDINATION
CM reviewed clinical notes. Patient's heartrate remain uncontrolled. Considering ablation.  Supplemental O2 3-6 lpm documented.   Transition of care to [facility] with prior auth expiring 6/25. CM updated Medina.  LOS 7D.

## 2024-06-24 NOTE — TELEPHONE ENCOUNTER
Spoke with patient daughter ( DARIA) Scheduled ERCP 8/12/2024, mailed prep instructions  Faxed medication clearance to Dr. Juvenal Partida for patient to hold eliquis 2 days prior to procedure

## 2024-06-24 NOTE — TELEPHONE ENCOUNTER
Patient having ERCP on 08/12/24 with Dr. Lopes. Requesting risk assessment and eliquis hold for 2 days prior. Fax: [phone]

## 2024-06-25 ENCOUNTER — APPOINTMENT (OUTPATIENT)
Dept: CARDIAC CATH/INVASIVE PROCEDURES | Age: 89
DRG: 308 | End: 2024-06-25
Attending: INTERNAL MEDICINE
Payer: MEDICARE

## 2024-06-25 LAB
ANION GAP SERPL CALC-SCNC: 8 MMOL/L (ref 9–18)
BASOPHILS # BLD: 0 K/UL (ref 0–0.2)
BASOPHILS NFR BLD: 0 % (ref 0–2)
BUN SERPL-MCNC: 22 MG/DL (ref 8–23)
CALCIUM SERPL-MCNC: 8.8 MG/DL (ref 8.8–10.2)
CHLORIDE SERPL-SCNC: 96 MMOL/L (ref 98–107)
CO2 SERPL-SCNC: 37 MMOL/L (ref 20–28)
CREAT SERPL-MCNC: 0.62 MG/DL (ref 0.6–1.1)
DIFFERENTIAL METHOD BLD: ABNORMAL
ECHO BSA: 1.68 M2
EOSINOPHIL # BLD: 0 K/UL (ref 0–0.8)
EOSINOPHIL NFR BLD: 0 % (ref 0.5–7.8)
ERYTHROCYTE [DISTWIDTH] IN BLOOD BY AUTOMATED COUNT: 14 % (ref 11.9–14.6)
GLUCOSE SERPL-MCNC: 148 MG/DL (ref 70–99)
HCT VFR BLD AUTO: 41.7 % (ref 35.8–46.3)
HGB BLD-MCNC: 13.4 G/DL (ref 11.7–15.4)
IMM GRANULOCYTES # BLD AUTO: 0.1 K/UL (ref 0–0.5)
IMM GRANULOCYTES NFR BLD AUTO: 2 % (ref 0–5)
LYMPHOCYTES # BLD: 0.5 K/UL (ref 0.5–4.6)
LYMPHOCYTES NFR BLD: 7 % (ref 13–44)
MAGNESIUM SERPL-MCNC: 1.9 MG/DL (ref 1.8–2.4)
MCH RBC QN AUTO: 32.2 PG (ref 26.1–32.9)
MCHC RBC AUTO-ENTMCNC: 32.1 G/DL (ref 31.4–35)
MCV RBC AUTO: 100.2 FL (ref 82–102)
MONOCYTES # BLD: 0.4 K/UL (ref 0.1–1.3)
MONOCYTES NFR BLD: 6 % (ref 4–12)
NEUTS SEG # BLD: 6.1 K/UL (ref 1.7–8.2)
NEUTS SEG NFR BLD: 85 % (ref 43–78)
NRBC # BLD: 0 K/UL (ref 0–0.2)
PLATELET # BLD AUTO: 161 K/UL (ref 150–450)
PMV BLD AUTO: 10.8 FL (ref 9.4–12.3)
POTASSIUM SERPL-SCNC: 4.5 MMOL/L (ref 3.5–5.1)
RBC # BLD AUTO: 4.16 M/UL (ref 4.05–5.2)
SODIUM SERPL-SCNC: 141 MMOL/L (ref 136–145)
WBC # BLD AUTO: 7.2 K/UL (ref 4.3–11.1)

## 2024-06-25 PROCEDURE — 2700000000 HC OXYGEN THERAPY PER DAY

## 2024-06-25 PROCEDURE — 6370000000 HC RX 637 (ALT 250 FOR IP): Performed by: INTERNAL MEDICINE

## 2024-06-25 PROCEDURE — 36415 COLL VENOUS BLD VENIPUNCTURE: CPT

## 2024-06-25 PROCEDURE — 2580000003 HC RX 258: Performed by: INTERNAL MEDICINE

## 2024-06-25 PROCEDURE — 93312 ECHO TRANSESOPHAGEAL: CPT

## 2024-06-25 PROCEDURE — 83735 ASSAY OF MAGNESIUM: CPT

## 2024-06-25 PROCEDURE — 99232 SBSQ HOSP IP/OBS MODERATE 35: CPT | Performed by: INTERNAL MEDICINE

## 2024-06-25 PROCEDURE — B24BZZ4 ULTRASONOGRAPHY OF HEART WITH AORTA, TRANSESOPHAGEAL: ICD-10-PCS | Performed by: INTERNAL MEDICINE

## 2024-06-25 PROCEDURE — 94761 N-INVAS EAR/PLS OXIMETRY MLT: CPT

## 2024-06-25 PROCEDURE — 93312 ECHO TRANSESOPHAGEAL: CPT | Performed by: INTERNAL MEDICINE

## 2024-06-25 PROCEDURE — 99152 MOD SED SAME PHYS/QHP 5/>YRS: CPT | Performed by: INTERNAL MEDICINE

## 2024-06-25 PROCEDURE — 6360000002 HC RX W HCPCS: Performed by: INTERNAL MEDICINE

## 2024-06-25 PROCEDURE — 93325 DOPPLER ECHO COLOR FLOW MAPG: CPT | Performed by: INTERNAL MEDICINE

## 2024-06-25 PROCEDURE — 1100000003 HC PRIVATE W/ TELEMETRY

## 2024-06-25 PROCEDURE — 6370000000 HC RX 637 (ALT 250 FOR IP): Performed by: FAMILY MEDICINE

## 2024-06-25 PROCEDURE — 80048 BASIC METABOLIC PNL TOTAL CA: CPT

## 2024-06-25 PROCEDURE — 85025 COMPLETE CBC W/AUTO DIFF WBC: CPT

## 2024-06-25 PROCEDURE — 99152 MOD SED SAME PHYS/QHP 5/>YRS: CPT

## 2024-06-25 RX ORDER — LIDOCAINE HYDROCHLORIDE 20 MG/ML
SOLUTION OROPHARYNGEAL PRN
Status: COMPLETED | OUTPATIENT
Start: 2024-06-25 | End: 2024-06-25

## 2024-06-25 RX ORDER — FENTANYL CITRATE 50 UG/ML
INJECTION, SOLUTION INTRAMUSCULAR; INTRAVENOUS PRN
Status: COMPLETED | OUTPATIENT
Start: 2024-06-25 | End: 2024-06-25

## 2024-06-25 RX ORDER — FUROSEMIDE 40 MG/1
40 TABLET ORAL DAILY
Status: DISCONTINUED | OUTPATIENT
Start: 2024-06-25 | End: 2024-06-29

## 2024-06-25 RX ORDER — METOPROLOL SUCCINATE 100 MG/1
100 TABLET, EXTENDED RELEASE ORAL EVERY 12 HOURS
Status: DISCONTINUED | OUTPATIENT
Start: 2024-06-25 | End: 2024-06-27

## 2024-06-25 RX ORDER — MIDAZOLAM HYDROCHLORIDE 1 MG/ML
INJECTION INTRAMUSCULAR; INTRAVENOUS PRN
Status: COMPLETED | OUTPATIENT
Start: 2024-06-25 | End: 2024-06-25

## 2024-06-25 RX ADMIN — FERROUS SULFATE TAB 325 MG (65 MG ELEMENTAL FE) 325 MG: 325 (65 FE) TAB at 11:36

## 2024-06-25 RX ADMIN — SPIRONOLACTONE 25 MG: 25 TABLET ORAL at 08:42

## 2024-06-25 RX ADMIN — LIDOCAINE HYDROCHLORIDE 15 ML: 20 SOLUTION ORAL at 14:40

## 2024-06-25 RX ADMIN — POTASSIUM CHLORIDE 20 MEQ: 1500 TABLET, EXTENDED RELEASE ORAL at 08:42

## 2024-06-25 RX ADMIN — ESCITALOPRAM OXALATE 20 MG: 10 TABLET ORAL at 08:42

## 2024-06-25 RX ADMIN — AMIODARONE HYDROCHLORIDE 150 MG: 50 INJECTION, SOLUTION INTRAVENOUS at 09:14

## 2024-06-25 RX ADMIN — POTASSIUM CHLORIDE 20 MEQ: 1500 TABLET, EXTENDED RELEASE ORAL at 17:53

## 2024-06-25 RX ADMIN — LEVOTHYROXINE SODIUM 88 MCG: 0.09 TABLET ORAL at 06:02

## 2024-06-25 RX ADMIN — TAMSULOSIN HYDROCHLORIDE 0.4 MG: 0.4 CAPSULE ORAL at 08:43

## 2024-06-25 RX ADMIN — METOPROLOL TARTRATE 100 MG: 100 TABLET, FILM COATED ORAL at 02:57

## 2024-06-25 RX ADMIN — MIDAZOLAM 2 MG: 1 INJECTION INTRAMUSCULAR; INTRAVENOUS at 14:44

## 2024-06-25 RX ADMIN — DOCUSATE SODIUM 50 MG AND SENNOSIDES 8.6 MG 2 TABLET: 8.6; 5 TABLET, FILM COATED ORAL at 19:51

## 2024-06-25 RX ADMIN — METOPROLOL SUCCINATE 100 MG: 100 TABLET, EXTENDED RELEASE ORAL at 08:42

## 2024-06-25 RX ADMIN — FUROSEMIDE 40 MG: 40 TABLET ORAL at 08:42

## 2024-06-25 RX ADMIN — AMIODARONE HYDROCHLORIDE 1 MG/MIN: 50 INJECTION, SOLUTION INTRAVENOUS at 09:27

## 2024-06-25 RX ADMIN — FERROUS SULFATE TAB 325 MG (65 MG ELEMENTAL FE) 325 MG: 325 (65 FE) TAB at 11:37

## 2024-06-25 RX ADMIN — APIXABAN 10 MG: 5 TABLET, FILM COATED ORAL at 08:42

## 2024-06-25 RX ADMIN — METOPROLOL SUCCINATE 100 MG: 100 TABLET, EXTENDED RELEASE ORAL at 19:51

## 2024-06-25 RX ADMIN — FENTANYL CITRATE 50 MCG: 50 INJECTION, SOLUTION INTRAMUSCULAR; INTRAVENOUS at 14:44

## 2024-06-25 RX ADMIN — EMPAGLIFLOZIN 10 MG: 10 TABLET, FILM COATED ORAL at 08:43

## 2024-06-25 RX ADMIN — APIXABAN 10 MG: 5 TABLET, FILM COATED ORAL at 19:51

## 2024-06-25 ASSESSMENT — PAIN SCALES - GENERAL
PAINLEVEL_OUTOF10: 0

## 2024-06-25 NOTE — PLAN OF CARE
Problem: Discharge Planning  Goal: Discharge to home or other facility with appropriate resources  Outcome: Progressing  Flowsheets  Taken 6/24/2024 2010 by Marimar Sun RN  Discharge to home or other facility with appropriate resources:   Identify barriers to discharge with patient and caregiver   Arrange for needed discharge resources and transportation as appropriate   Identify discharge learning needs (meds, wound care, etc)  Taken 6/24/2024 0800 by Farzana Torres RN  Discharge to home or other facility with appropriate resources: Identify barriers to discharge with patient and caregiver     Problem: Skin/Tissue Integrity  Goal: Absence of new skin breakdown  Description: 1.  Monitor for areas of redness and/or skin breakdown  2.  Assess vascular access sites hourly  3.  Every 4-6 hours minimum:  Change oxygen saturation probe site  4.  Every 4-6 hours:  If on nasal continuous positive airway pressure, respiratory therapy assess nares and determine need for appliance change or resting period.  Outcome: Progressing     Problem: Safety - Adult  Goal: Free from fall injury  Outcome: Progressing  Flowsheets (Taken 6/24/2024 2010)  Free From Fall Injury: Instruct family/caregiver on patient safety     Problem: ABCDS Injury Assessment  Goal: Absence of physical injury  Outcome: Progressing  Flowsheets (Taken 6/24/2024 2010)  Absence of Physical Injury: Implement safety measures based on patient assessment     Problem: Pain  Goal: Verbalizes/displays adequate comfort level or baseline comfort level  Outcome: Progressing  Flowsheets (Taken 6/24/2024 2010)  Verbalizes/displays adequate comfort level or baseline comfort level:   Encourage patient to monitor pain and request assistance   Assess pain using appropriate pain scale   Administer analgesics based on type and severity of pain and evaluate response

## 2024-06-26 PROBLEM — E78.5 HYPERLIPIDEMIA: Chronic | Status: ACTIVE | Noted: 2021-08-16

## 2024-06-26 PROBLEM — R17 ELEVATED BILIRUBIN: Status: RESOLVED | Noted: 2024-06-17 | Resolved: 2024-06-26

## 2024-06-26 PROBLEM — E87.0 HYPERNATREMIA: Status: RESOLVED | Noted: 2024-06-18 | Resolved: 2024-06-26

## 2024-06-26 PROBLEM — I42.9 MYOCARDIOPATHY (HCC): Chronic | Status: ACTIVE | Noted: 2024-06-21

## 2024-06-26 PROBLEM — I71.40 AAA (ABDOMINAL AORTIC ANEURYSM) (HCC): Chronic | Status: ACTIVE | Noted: 2024-06-18

## 2024-06-26 PROBLEM — K75.81 NASH (NONALCOHOLIC STEATOHEPATITIS): Chronic | Status: RESOLVED | Noted: 2024-06-18 | Resolved: 2024-06-26

## 2024-06-26 PROBLEM — I51.3 ATRIAL THROMBOSIS: Status: ACTIVE | Noted: 2024-06-26

## 2024-06-26 PROBLEM — N17.9 AKI (ACUTE KIDNEY INJURY) (HCC): Status: RESOLVED | Noted: 2024-06-18 | Resolved: 2024-06-26

## 2024-06-26 PROBLEM — K75.81 NASH (NONALCOHOLIC STEATOHEPATITIS): Chronic | Status: ACTIVE | Noted: 2024-06-18

## 2024-06-26 PROBLEM — R60.0 LOWER EXTREMITY EDEMA: Status: RESOLVED | Noted: 2024-06-21 | Resolved: 2024-06-26

## 2024-06-26 PROBLEM — I10 HYPERTENSION: Chronic | Status: ACTIVE | Noted: 2021-08-16

## 2024-06-26 PROBLEM — K75.81 NASH (NONALCOHOLIC STEATOHEPATITIS): Status: ACTIVE | Noted: 2024-06-18

## 2024-06-26 PROBLEM — E87.20 LACTIC ACIDOSIS: Status: RESOLVED | Noted: 2024-06-18 | Resolved: 2024-06-26

## 2024-06-26 PROBLEM — I51.89 GRADE II DIASTOLIC DYSFUNCTION: Chronic | Status: ACTIVE | Noted: 2021-08-16

## 2024-06-26 PROBLEM — R79.89 ELEVATED LFTS: Status: RESOLVED | Noted: 2024-06-18 | Resolved: 2024-06-26

## 2024-06-26 LAB
ANION GAP SERPL CALC-SCNC: 12 MMOL/L (ref 9–18)
BASOPHILS # BLD: 0 K/UL (ref 0–0.2)
BASOPHILS NFR BLD: 0 % (ref 0–2)
BUN SERPL-MCNC: 24 MG/DL (ref 8–23)
CALCIUM SERPL-MCNC: 8.7 MG/DL (ref 8.8–10.2)
CHLORIDE SERPL-SCNC: 93 MMOL/L (ref 98–107)
CO2 SERPL-SCNC: 34 MMOL/L (ref 20–28)
CREAT SERPL-MCNC: 0.69 MG/DL (ref 0.6–1.1)
DIFFERENTIAL METHOD BLD: ABNORMAL
EOSINOPHIL # BLD: 0 K/UL (ref 0–0.8)
EOSINOPHIL NFR BLD: 0 % (ref 0.5–7.8)
ERYTHROCYTE [DISTWIDTH] IN BLOOD BY AUTOMATED COUNT: 13.8 % (ref 11.9–14.6)
GLUCOSE SERPL-MCNC: 100 MG/DL (ref 70–99)
HCT VFR BLD AUTO: 44.5 % (ref 35.8–46.3)
HGB BLD-MCNC: 14.3 G/DL (ref 11.7–15.4)
IMM GRANULOCYTES # BLD AUTO: 0.1 K/UL (ref 0–0.5)
IMM GRANULOCYTES NFR BLD AUTO: 1 % (ref 0–5)
LYMPHOCYTES # BLD: 0.7 K/UL (ref 0.5–4.6)
LYMPHOCYTES NFR BLD: 7 % (ref 13–44)
MAGNESIUM SERPL-MCNC: 1.8 MG/DL (ref 1.8–2.4)
MCH RBC QN AUTO: 31.8 PG (ref 26.1–32.9)
MCHC RBC AUTO-ENTMCNC: 32.1 G/DL (ref 31.4–35)
MCV RBC AUTO: 98.9 FL (ref 82–102)
MONOCYTES # BLD: 0.4 K/UL (ref 0.1–1.3)
MONOCYTES NFR BLD: 4 % (ref 4–12)
NEUTS SEG # BLD: 8.7 K/UL (ref 1.7–8.2)
NEUTS SEG NFR BLD: 88 % (ref 43–78)
NRBC # BLD: 0 K/UL (ref 0–0.2)
PLATELET # BLD AUTO: 190 K/UL (ref 150–450)
PMV BLD AUTO: 10.3 FL (ref 9.4–12.3)
POTASSIUM SERPL-SCNC: 4.4 MMOL/L (ref 3.5–5.1)
RBC # BLD AUTO: 4.5 M/UL (ref 4.05–5.2)
SODIUM SERPL-SCNC: 140 MMOL/L (ref 136–145)
WBC # BLD AUTO: 9.9 K/UL (ref 4.3–11.1)

## 2024-06-26 PROCEDURE — 1100000003 HC PRIVATE W/ TELEMETRY

## 2024-06-26 PROCEDURE — 6370000000 HC RX 637 (ALT 250 FOR IP): Performed by: INTERNAL MEDICINE

## 2024-06-26 PROCEDURE — 80048 BASIC METABOLIC PNL TOTAL CA: CPT

## 2024-06-26 PROCEDURE — 83735 ASSAY OF MAGNESIUM: CPT

## 2024-06-26 PROCEDURE — 6360000002 HC RX W HCPCS: Performed by: INTERNAL MEDICINE

## 2024-06-26 PROCEDURE — 76937 US GUIDE VASCULAR ACCESS: CPT

## 2024-06-26 PROCEDURE — 99232 SBSQ HOSP IP/OBS MODERATE 35: CPT | Performed by: INTERNAL MEDICINE

## 2024-06-26 PROCEDURE — 85025 COMPLETE CBC W/AUTO DIFF WBC: CPT

## 2024-06-26 PROCEDURE — 6370000000 HC RX 637 (ALT 250 FOR IP): Performed by: FAMILY MEDICINE

## 2024-06-26 PROCEDURE — 99233 SBSQ HOSP IP/OBS HIGH 50: CPT | Performed by: INTERNAL MEDICINE

## 2024-06-26 PROCEDURE — 36415 COLL VENOUS BLD VENIPUNCTURE: CPT

## 2024-06-26 RX ORDER — MAGNESIUM SULFATE 1 G/100ML
1000 INJECTION INTRAVENOUS ONCE
Status: COMPLETED | OUTPATIENT
Start: 2024-06-26 | End: 2024-06-26

## 2024-06-26 RX ORDER — DILTIAZEM HYDROCHLORIDE 60 MG/1
60 TABLET, FILM COATED ORAL EVERY 6 HOURS SCHEDULED
Status: DISCONTINUED | OUTPATIENT
Start: 2024-06-26 | End: 2024-06-29

## 2024-06-26 RX ADMIN — FERROUS SULFATE TAB 325 MG (65 MG ELEMENTAL FE) 325 MG: 325 (65 FE) TAB at 12:22

## 2024-06-26 RX ADMIN — APIXABAN 10 MG: 5 TABLET, FILM COATED ORAL at 08:34

## 2024-06-26 RX ADMIN — POTASSIUM CHLORIDE 20 MEQ: 1500 TABLET, EXTENDED RELEASE ORAL at 08:33

## 2024-06-26 RX ADMIN — DILTIAZEM HYDROCHLORIDE 60 MG: 60 TABLET, FILM COATED ORAL at 12:22

## 2024-06-26 RX ADMIN — SPIRONOLACTONE 25 MG: 25 TABLET ORAL at 08:33

## 2024-06-26 RX ADMIN — METOPROLOL SUCCINATE 100 MG: 100 TABLET, EXTENDED RELEASE ORAL at 08:33

## 2024-06-26 RX ADMIN — DOCUSATE SODIUM 50 MG AND SENNOSIDES 8.6 MG 2 TABLET: 8.6; 5 TABLET, FILM COATED ORAL at 20:17

## 2024-06-26 RX ADMIN — FUROSEMIDE 40 MG: 40 TABLET ORAL at 08:33

## 2024-06-26 RX ADMIN — DILTIAZEM HYDROCHLORIDE 60 MG: 60 TABLET, FILM COATED ORAL at 17:32

## 2024-06-26 RX ADMIN — APIXABAN 10 MG: 5 TABLET, FILM COATED ORAL at 20:17

## 2024-06-26 RX ADMIN — LEVOTHYROXINE SODIUM 88 MCG: 0.09 TABLET ORAL at 04:55

## 2024-06-26 RX ADMIN — DILTIAZEM HYDROCHLORIDE 60 MG: 60 TABLET, FILM COATED ORAL at 23:41

## 2024-06-26 RX ADMIN — MAGNESIUM SULFATE HEPTAHYDRATE 1000 MG: 1 INJECTION, SOLUTION INTRAVENOUS at 12:29

## 2024-06-26 RX ADMIN — TAMSULOSIN HYDROCHLORIDE 0.4 MG: 0.4 CAPSULE ORAL at 08:33

## 2024-06-26 RX ADMIN — DILTIAZEM HYDROCHLORIDE 60 MG: 60 TABLET, FILM COATED ORAL at 08:38

## 2024-06-26 RX ADMIN — EMPAGLIFLOZIN 10 MG: 10 TABLET, FILM COATED ORAL at 08:33

## 2024-06-26 RX ADMIN — ESCITALOPRAM OXALATE 20 MG: 10 TABLET ORAL at 08:32

## 2024-06-26 RX ADMIN — POTASSIUM CHLORIDE 20 MEQ: 1500 TABLET, EXTENDED RELEASE ORAL at 17:33

## 2024-06-26 ASSESSMENT — PAIN SCALES - GENERAL
PAINLEVEL_OUTOF10: 0

## 2024-06-26 NOTE — CARE COORDINATION
CM reviewed clinicals then met with patient. Remain in Afib with RVR. 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 Plan rate control. S/P ERCP with stent placed 6/20. On oral diuretic. O2 at 4 lpm. O2 Pulm following.   Transition of care plan to [facility] at discharge. Patient will need new prior auth when medically stable  and new clinicals documented. NEYDA has communicated POC to Sondra at [facility].

## 2024-06-26 NOTE — PLAN OF CARE
Problem: Discharge Planning  Goal: Discharge to home or other facility with appropriate resources  Outcome: Progressing  Flowsheets  Taken 6/25/2024 2000 by Marimar Sun RN  Discharge to home or other facility with appropriate resources:   Identify barriers to discharge with patient and caregiver   Arrange for needed discharge resources and transportation as appropriate   Identify discharge learning needs (meds, wound care, etc)  Taken 6/25/2024 0800 by Farzana Torres RN  Discharge to home or other facility with appropriate resources: Identify barriers to discharge with patient and caregiver     Problem: Skin/Tissue Integrity  Goal: Absence of new skin breakdown  Description: 1.  Monitor for areas of redness and/or skin breakdown  2.  Assess vascular access sites hourly  3.  Every 4-6 hours minimum:  Change oxygen saturation probe site  4.  Every 4-6 hours:  If on nasal continuous positive airway pressure, respiratory therapy assess nares and determine need for appliance change or resting period.  Outcome: Progressing     Problem: Safety - Adult  Goal: Free from fall injury  Outcome: Progressing  Flowsheets (Taken 6/25/2024 2000)  Free From Fall Injury: Instruct family/caregiver on patient safety     Problem: ABCDS Injury Assessment  Goal: Absence of physical injury  Outcome: Progressing  Flowsheets (Taken 6/25/2024 2000)  Absence of Physical Injury: Implement safety measures based on patient assessment     Problem: Pain  Goal: Verbalizes/displays adequate comfort level or baseline comfort level  Outcome: Progressing  Flowsheets (Taken 6/25/2024 2000)  Verbalizes/displays adequate comfort level or baseline comfort level:   Encourage patient to monitor pain and request assistance   Assess pain using appropriate pain scale   Administer analgesics based on type and severity of pain and evaluate response

## 2024-06-26 NOTE — ACP (ADVANCE CARE PLANNING)
[facility] Hospitalist Service  At the heart of better care     Advance Care Planning   Admit Date:  2024  7:32 PM   Name:  Juany Foy   Age:  89 y.o.  Sex:  female  :  1935   MRN:  [id number]   Room:  [facility]/    Juany Foy has capacity to make her own decisions:   Yes    If pt unable to make decisions, POA/surrogate decision maker:  Daughter and sons    Other people present:   Son and Daughter    Patient / surrogate decision-maker directed code status:  DNR/DNI    Other ACP topics discussed, if applicable:   Discussed possibility of hospice or comfort measures.     Discussed options of continuing medical management and try to get pt more mobile and to rehab, or hospice.   Family will discuss and let us know.    Patient or surrogate consented to discussion of the current conditions, workup, management plans, prognosis, and the risk for further deterioration.  Time spent: 17 minutes in direct discussion.      Signed:  Stevo Henderson MD

## 2024-06-27 ENCOUNTER — APPOINTMENT (OUTPATIENT)
Dept: GENERAL RADIOLOGY | Age: 89
DRG: 308 | End: 2024-06-27
Payer: MEDICARE

## 2024-06-27 LAB
ANION GAP SERPL CALC-SCNC: 10 MMOL/L (ref 9–18)
BASOPHILS # BLD: 0 K/UL (ref 0–0.2)
BASOPHILS NFR BLD: 0 % (ref 0–2)
BUN SERPL-MCNC: 25 MG/DL (ref 8–23)
CALCIUM SERPL-MCNC: 8.1 MG/DL (ref 8.8–10.2)
CHLORIDE SERPL-SCNC: 95 MMOL/L (ref 98–107)
CO2 SERPL-SCNC: 33 MMOL/L (ref 20–28)
CREAT SERPL-MCNC: 0.72 MG/DL (ref 0.6–1.1)
DIFFERENTIAL METHOD BLD: ABNORMAL
EOSINOPHIL # BLD: 0 K/UL (ref 0–0.8)
EOSINOPHIL NFR BLD: 0 % (ref 0.5–7.8)
ERYTHROCYTE [DISTWIDTH] IN BLOOD BY AUTOMATED COUNT: 13.7 % (ref 11.9–14.6)
GLUCOSE SERPL-MCNC: 146 MG/DL (ref 70–99)
HCT VFR BLD AUTO: 38.9 % (ref 35.8–46.3)
HEMOCCULT STL QL: POSITIVE
HGB BLD-MCNC: 12.7 G/DL (ref 11.7–15.4)
IMM GRANULOCYTES # BLD AUTO: 0.1 K/UL (ref 0–0.5)
IMM GRANULOCYTES NFR BLD AUTO: 1 % (ref 0–5)
LYMPHOCYTES # BLD: 0.6 K/UL (ref 0.5–4.6)
LYMPHOCYTES NFR BLD: 7 % (ref 13–44)
MAGNESIUM SERPL-MCNC: 2 MG/DL (ref 1.8–2.4)
MCH RBC QN AUTO: 32.4 PG (ref 26.1–32.9)
MCHC RBC AUTO-ENTMCNC: 32.6 G/DL (ref 31.4–35)
MCV RBC AUTO: 99.2 FL (ref 82–102)
MONOCYTES # BLD: 0.5 K/UL (ref 0.1–1.3)
MONOCYTES NFR BLD: 5 % (ref 4–12)
NEUTS SEG # BLD: 7.7 K/UL (ref 1.7–8.2)
NEUTS SEG NFR BLD: 87 % (ref 43–78)
NRBC # BLD: 0 K/UL (ref 0–0.2)
PLATELET # BLD AUTO: 193 K/UL (ref 150–450)
PMV BLD AUTO: 10.2 FL (ref 9.4–12.3)
POTASSIUM SERPL-SCNC: 4.5 MMOL/L (ref 3.5–5.1)
RBC # BLD AUTO: 3.92 M/UL (ref 4.05–5.2)
SODIUM SERPL-SCNC: 138 MMOL/L (ref 136–145)
WBC # BLD AUTO: 8.9 K/UL (ref 4.3–11.1)

## 2024-06-27 PROCEDURE — 99232 SBSQ HOSP IP/OBS MODERATE 35: CPT | Performed by: INTERNAL MEDICINE

## 2024-06-27 PROCEDURE — 6370000000 HC RX 637 (ALT 250 FOR IP): Performed by: INTERNAL MEDICINE

## 2024-06-27 PROCEDURE — 97112 NEUROMUSCULAR REEDUCATION: CPT

## 2024-06-27 PROCEDURE — C9113 INJ PANTOPRAZOLE SODIUM, VIA: HCPCS | Performed by: INTERNAL MEDICINE

## 2024-06-27 PROCEDURE — 85025 COMPLETE CBC W/AUTO DIFF WBC: CPT

## 2024-06-27 PROCEDURE — 2580000003 HC RX 258: Performed by: INTERNAL MEDICINE

## 2024-06-27 PROCEDURE — 6370000000 HC RX 637 (ALT 250 FOR IP): Performed by: FAMILY MEDICINE

## 2024-06-27 PROCEDURE — 82272 OCCULT BLD FECES 1-3 TESTS: CPT

## 2024-06-27 PROCEDURE — 80048 BASIC METABOLIC PNL TOTAL CA: CPT

## 2024-06-27 PROCEDURE — 36415 COLL VENOUS BLD VENIPUNCTURE: CPT

## 2024-06-27 PROCEDURE — 97530 THERAPEUTIC ACTIVITIES: CPT

## 2024-06-27 PROCEDURE — 6360000002 HC RX W HCPCS: Performed by: INTERNAL MEDICINE

## 2024-06-27 PROCEDURE — 83735 ASSAY OF MAGNESIUM: CPT

## 2024-06-27 PROCEDURE — 97535 SELF CARE MNGMENT TRAINING: CPT

## 2024-06-27 PROCEDURE — 1100000003 HC PRIVATE W/ TELEMETRY

## 2024-06-27 PROCEDURE — 71045 X-RAY EXAM CHEST 1 VIEW: CPT

## 2024-06-27 RX ORDER — METOPROLOL SUCCINATE 100 MG/1
200 TABLET, EXTENDED RELEASE ORAL EVERY 12 HOURS
Status: DISCONTINUED | OUTPATIENT
Start: 2024-06-27 | End: 2024-06-30

## 2024-06-27 RX ORDER — PANTOPRAZOLE SODIUM 40 MG/1
40 TABLET, DELAYED RELEASE ORAL
Status: DISCONTINUED | OUTPATIENT
Start: 2024-06-27 | End: 2024-07-02 | Stop reason: ALTCHOICE

## 2024-06-27 RX ADMIN — LEVOTHYROXINE SODIUM 88 MCG: 0.09 TABLET ORAL at 05:29

## 2024-06-27 RX ADMIN — DILTIAZEM HYDROCHLORIDE 60 MG: 60 TABLET, FILM COATED ORAL at 17:36

## 2024-06-27 RX ADMIN — EMPAGLIFLOZIN 10 MG: 10 TABLET, FILM COATED ORAL at 08:16

## 2024-06-27 RX ADMIN — POTASSIUM CHLORIDE 20 MEQ: 1500 TABLET, EXTENDED RELEASE ORAL at 08:16

## 2024-06-27 RX ADMIN — PANTOPRAZOLE SODIUM 40 MG: 40 TABLET, DELAYED RELEASE ORAL at 17:36

## 2024-06-27 RX ADMIN — ESCITALOPRAM OXALATE 20 MG: 10 TABLET ORAL at 08:16

## 2024-06-27 RX ADMIN — POTASSIUM CHLORIDE 20 MEQ: 1500 TABLET, EXTENDED RELEASE ORAL at 17:36

## 2024-06-27 RX ADMIN — METOPROLOL SUCCINATE 200 MG: 100 TABLET, EXTENDED RELEASE ORAL at 20:33

## 2024-06-27 RX ADMIN — POLYETHYLENE GLYCOL 3350 17 G: 17 POWDER, FOR SOLUTION ORAL at 08:16

## 2024-06-27 RX ADMIN — APIXABAN 10 MG: 5 TABLET, FILM COATED ORAL at 20:33

## 2024-06-27 RX ADMIN — TAMSULOSIN HYDROCHLORIDE 0.4 MG: 0.4 CAPSULE ORAL at 08:15

## 2024-06-27 RX ADMIN — DOCUSATE SODIUM 50 MG AND SENNOSIDES 8.6 MG 2 TABLET: 8.6; 5 TABLET, FILM COATED ORAL at 08:16

## 2024-06-27 RX ADMIN — SPIRONOLACTONE 25 MG: 25 TABLET ORAL at 08:15

## 2024-06-27 RX ADMIN — METOPROLOL SUCCINATE 100 MG: 100 TABLET, EXTENDED RELEASE ORAL at 08:15

## 2024-06-27 RX ADMIN — DILTIAZEM HYDROCHLORIDE 60 MG: 60 TABLET, FILM COATED ORAL at 05:29

## 2024-06-27 RX ADMIN — DILTIAZEM HYDROCHLORIDE 60 MG: 60 TABLET, FILM COATED ORAL at 12:08

## 2024-06-27 RX ADMIN — FUROSEMIDE 40 MG: 40 TABLET ORAL at 08:15

## 2024-06-27 RX ADMIN — DOCUSATE SODIUM 50 MG AND SENNOSIDES 8.6 MG 2 TABLET: 8.6; 5 TABLET, FILM COATED ORAL at 20:32

## 2024-06-27 RX ADMIN — APIXABAN 10 MG: 5 TABLET, FILM COATED ORAL at 08:16

## 2024-06-27 RX ADMIN — PANTOPRAZOLE SODIUM 40 MG: 40 INJECTION, POWDER, FOR SOLUTION INTRAVENOUS at 12:08

## 2024-06-27 RX ADMIN — FERROUS SULFATE TAB 325 MG (65 MG ELEMENTAL FE) 325 MG: 325 (65 FE) TAB at 12:08

## 2024-06-27 ASSESSMENT — PAIN SCALES - GENERAL
PAINLEVEL_OUTOF10: 0

## 2024-06-27 NOTE — PLAN OF CARE
Problem: Discharge Planning  Goal: Discharge to home or other facility with appropriate resources  Outcome: Progressing  Flowsheets  Taken 6/26/2024 2016 by Marimar Sun RN  Discharge to home or other facility with appropriate resources:   Identify barriers to discharge with patient and caregiver   Arrange for needed discharge resources and transportation as appropriate   Identify discharge learning needs (meds, wound care, etc)  Taken 6/26/2024 0800 by Farzana Torres RN  Discharge to home or other facility with appropriate resources: Identify barriers to discharge with patient and caregiver     Problem: Skin/Tissue Integrity  Goal: Absence of new skin breakdown  Description: 1.  Monitor for areas of redness and/or skin breakdown  2.  Assess vascular access sites hourly  3.  Every 4-6 hours minimum:  Change oxygen saturation probe site  4.  Every 4-6 hours:  If on nasal continuous positive airway pressure, respiratory therapy assess nares and determine need for appliance change or resting period.  Outcome: Progressing     Problem: Safety - Adult  Goal: Free from fall injury  Outcome: Progressing  Flowsheets (Taken 6/26/2024 2016)  Free From Fall Injury: Instruct family/caregiver on patient safety     Problem: ABCDS Injury Assessment  Goal: Absence of physical injury  Outcome: Progressing  Flowsheets (Taken 6/26/2024 2016)  Absence of Physical Injury: Implement safety measures based on patient assessment     Problem: Pain  Goal: Verbalizes/displays adequate comfort level or baseline comfort level  Outcome: Progressing  Flowsheets (Taken 6/26/2024 2016)  Verbalizes/displays adequate comfort level or baseline comfort level:   Encourage patient to monitor pain and request assistance   Assess pain using appropriate pain scale   Administer analgesics based on type and severity of pain and evaluate response

## 2024-06-27 NOTE — PLAN OF CARE
Problem: Discharge Planning  Goal: Discharge to home or other facility with appropriate resources  Outcome: Progressing  Flowsheets  Taken 6/27/2024 1656  Discharge to home or other facility with appropriate resources: Identify barriers to discharge with patient and caregiver  Taken 6/27/2024 1213  Discharge to home or other facility with appropriate resources: Identify barriers to discharge with patient and caregiver  Taken 6/27/2024 0833  Discharge to home or other facility with appropriate resources: Identify barriers to discharge with patient and caregiver     Problem: Skin/Tissue Integrity  Goal: Absence of new skin breakdown  Description: 1.  Monitor for areas of redness and/or skin breakdown  2.  Assess vascular access sites hourly  3.  Every 4-6 hours minimum:  Change oxygen saturation probe site  4.  Every 4-6 hours:  If on nasal continuous positive airway pressure, respiratory therapy assess nares and determine need for appliance change or resting period.  Outcome: Progressing     Problem: Safety - Adult  Goal: Free from fall injury  Outcome: Progressing  Flowsheets  Taken 6/27/2024 1656  Free From Fall Injury: Instruct family/caregiver on patient safety  Taken 6/27/2024 0833  Free From Fall Injury: Instruct family/caregiver on patient safety     Problem: ABCDS Injury Assessment  Goal: Absence of physical injury  Outcome: Progressing     Problem: Pain  Goal: Verbalizes/displays adequate comfort level or baseline comfort level  Outcome: Progressing  Flowsheets  Taken 6/27/2024 1656  Verbalizes/displays adequate comfort level or baseline comfort level:   Encourage patient to monitor pain and request assistance   Assess pain using appropriate pain scale   Administer analgesics based on type and severity of pain and evaluate response   Implement non-pharmacological measures as appropriate and evaluate response   Consider cultural and social influences on pain and pain management   Notify Licensed Independent

## 2024-06-28 LAB
ANION GAP SERPL CALC-SCNC: 7 MMOL/L (ref 9–18)
BASOPHILS # BLD: 0 K/UL (ref 0–0.2)
BASOPHILS NFR BLD: 0 % (ref 0–2)
BUN SERPL-MCNC: 30 MG/DL (ref 8–23)
CALCIUM SERPL-MCNC: 8.8 MG/DL (ref 8.8–10.2)
CHLORIDE SERPL-SCNC: 96 MMOL/L (ref 98–107)
CO2 SERPL-SCNC: 34 MMOL/L (ref 20–28)
CREAT SERPL-MCNC: 0.71 MG/DL (ref 0.6–1.1)
DIFFERENTIAL METHOD BLD: ABNORMAL
EOSINOPHIL # BLD: 0 K/UL (ref 0–0.8)
EOSINOPHIL NFR BLD: 0 % (ref 0.5–7.8)
ERYTHROCYTE [DISTWIDTH] IN BLOOD BY AUTOMATED COUNT: 13.8 % (ref 11.9–14.6)
GLUCOSE SERPL-MCNC: 198 MG/DL (ref 70–99)
HCT VFR BLD AUTO: 38.1 % (ref 35.8–46.3)
HGB BLD-MCNC: 12.5 G/DL (ref 11.7–15.4)
IMM GRANULOCYTES # BLD AUTO: 0.1 K/UL (ref 0–0.5)
IMM GRANULOCYTES NFR BLD AUTO: 1 % (ref 0–5)
LYMPHOCYTES # BLD: 0.6 K/UL (ref 0.5–4.6)
LYMPHOCYTES NFR BLD: 6 % (ref 13–44)
MAGNESIUM SERPL-MCNC: 2.1 MG/DL (ref 1.8–2.4)
MCH RBC QN AUTO: 32.1 PG (ref 26.1–32.9)
MCHC RBC AUTO-ENTMCNC: 32.8 G/DL (ref 31.4–35)
MCV RBC AUTO: 97.9 FL (ref 82–102)
MONOCYTES # BLD: 0.4 K/UL (ref 0.1–1.3)
MONOCYTES NFR BLD: 4 % (ref 4–12)
NEUTS SEG # BLD: 8.3 K/UL (ref 1.7–8.2)
NEUTS SEG NFR BLD: 89 % (ref 43–78)
NRBC # BLD: 0 K/UL (ref 0–0.2)
PLATELET # BLD AUTO: 215 K/UL (ref 150–450)
PMV BLD AUTO: 10 FL (ref 9.4–12.3)
POTASSIUM SERPL-SCNC: 4.6 MMOL/L (ref 3.5–5.1)
RBC # BLD AUTO: 3.89 M/UL (ref 4.05–5.2)
SODIUM SERPL-SCNC: 137 MMOL/L (ref 136–145)
WBC # BLD AUTO: 9.5 K/UL (ref 4.3–11.1)

## 2024-06-28 PROCEDURE — 6370000000 HC RX 637 (ALT 250 FOR IP): Performed by: INTERNAL MEDICINE

## 2024-06-28 PROCEDURE — 85025 COMPLETE CBC W/AUTO DIFF WBC: CPT

## 2024-06-28 PROCEDURE — 1100000003 HC PRIVATE W/ TELEMETRY

## 2024-06-28 PROCEDURE — 97530 THERAPEUTIC ACTIVITIES: CPT

## 2024-06-28 PROCEDURE — 2700000000 HC OXYGEN THERAPY PER DAY

## 2024-06-28 PROCEDURE — 36415 COLL VENOUS BLD VENIPUNCTURE: CPT

## 2024-06-28 PROCEDURE — 97112 NEUROMUSCULAR REEDUCATION: CPT

## 2024-06-28 PROCEDURE — 94760 N-INVAS EAR/PLS OXIMETRY 1: CPT

## 2024-06-28 PROCEDURE — 97535 SELF CARE MNGMENT TRAINING: CPT

## 2024-06-28 PROCEDURE — 99232 SBSQ HOSP IP/OBS MODERATE 35: CPT | Performed by: INTERNAL MEDICINE

## 2024-06-28 PROCEDURE — 6370000000 HC RX 637 (ALT 250 FOR IP): Performed by: FAMILY MEDICINE

## 2024-06-28 PROCEDURE — 83735 ASSAY OF MAGNESIUM: CPT

## 2024-06-28 PROCEDURE — 80048 BASIC METABOLIC PNL TOTAL CA: CPT

## 2024-06-28 RX ADMIN — DILTIAZEM HYDROCHLORIDE 60 MG: 60 TABLET, FILM COATED ORAL at 05:49

## 2024-06-28 RX ADMIN — DILTIAZEM HYDROCHLORIDE 60 MG: 60 TABLET, FILM COATED ORAL at 13:06

## 2024-06-28 RX ADMIN — METOPROLOL SUCCINATE 200 MG: 100 TABLET, EXTENDED RELEASE ORAL at 08:22

## 2024-06-28 RX ADMIN — APIXABAN 10 MG: 5 TABLET, FILM COATED ORAL at 08:23

## 2024-06-28 RX ADMIN — DILTIAZEM HYDROCHLORIDE 60 MG: 60 TABLET, FILM COATED ORAL at 00:10

## 2024-06-28 RX ADMIN — METOPROLOL SUCCINATE 200 MG: 100 TABLET, EXTENDED RELEASE ORAL at 19:50

## 2024-06-28 RX ADMIN — FUROSEMIDE 40 MG: 40 TABLET ORAL at 08:23

## 2024-06-28 RX ADMIN — APIXABAN 10 MG: 5 TABLET, FILM COATED ORAL at 20:31

## 2024-06-28 RX ADMIN — EMPAGLIFLOZIN 10 MG: 10 TABLET, FILM COATED ORAL at 08:23

## 2024-06-28 RX ADMIN — TAMSULOSIN HYDROCHLORIDE 0.4 MG: 0.4 CAPSULE ORAL at 08:23

## 2024-06-28 RX ADMIN — PANTOPRAZOLE SODIUM 40 MG: 40 TABLET, DELAYED RELEASE ORAL at 05:49

## 2024-06-28 RX ADMIN — DOCUSATE SODIUM 50 MG AND SENNOSIDES 8.6 MG 2 TABLET: 8.6; 5 TABLET, FILM COATED ORAL at 08:22

## 2024-06-28 RX ADMIN — POTASSIUM CHLORIDE 20 MEQ: 1500 TABLET, EXTENDED RELEASE ORAL at 08:22

## 2024-06-28 RX ADMIN — FERROUS SULFATE TAB 325 MG (65 MG ELEMENTAL FE) 325 MG: 325 (65 FE) TAB at 12:18

## 2024-06-28 RX ADMIN — ESCITALOPRAM OXALATE 20 MG: 10 TABLET ORAL at 08:23

## 2024-06-28 RX ADMIN — LEVOTHYROXINE SODIUM 88 MCG: 0.09 TABLET ORAL at 05:49

## 2024-06-28 RX ADMIN — DOCUSATE SODIUM 50 MG AND SENNOSIDES 8.6 MG 2 TABLET: 8.6; 5 TABLET, FILM COATED ORAL at 19:50

## 2024-06-28 RX ADMIN — POTASSIUM CHLORIDE 20 MEQ: 1500 TABLET, EXTENDED RELEASE ORAL at 17:26

## 2024-06-28 RX ADMIN — DILTIAZEM HYDROCHLORIDE 60 MG: 60 TABLET, FILM COATED ORAL at 17:26

## 2024-06-28 RX ADMIN — POLYETHYLENE GLYCOL 3350 17 G: 17 POWDER, FOR SOLUTION ORAL at 08:24

## 2024-06-28 RX ADMIN — PANTOPRAZOLE SODIUM 40 MG: 40 TABLET, DELAYED RELEASE ORAL at 16:04

## 2024-06-28 RX ADMIN — SPIRONOLACTONE 25 MG: 25 TABLET ORAL at 08:23

## 2024-06-28 NOTE — PLAN OF CARE
Problem: Discharge Planning  Goal: Discharge to home or other facility with appropriate resources  6/28/2024 0016 by Ladi Dey RN  Outcome: Progressing  6/27/2024 1842 by Iris Hart RN  Outcome: Progressing  Flowsheets  Taken 6/27/2024 1656  Discharge to home or other facility with appropriate resources: Identify barriers to discharge with patient and caregiver  Taken 6/27/2024 1213  Discharge to home or other facility with appropriate resources: Identify barriers to discharge with patient and caregiver  Taken 6/27/2024 0833  Discharge to home or other facility with appropriate resources: Identify barriers to discharge with patient and caregiver     Problem: Skin/Tissue Integrity  Goal: Absence of new skin breakdown  Description: 1.  Monitor for areas of redness and/or skin breakdown  2.  Assess vascular access sites hourly  3.  Every 4-6 hours minimum:  Change oxygen saturation probe site  4.  Every 4-6 hours:  If on nasal continuous positive airway pressure, respiratory therapy assess nares and determine need for appliance change or resting period.  6/28/2024 0016 by Ladi Dey RN  Outcome: Progressing  6/27/2024 1842 by Iris Hart RN  Outcome: Progressing     Problem: Safety - Adult  Goal: Free from fall injury  6/28/2024 0016 by Ladi Dey RN  Outcome: Progressing  6/27/2024 1842 by Iris Hart RN  Outcome: Progressing  Flowsheets  Taken 6/27/2024 1656  Free From Fall Injury: Instruct family/caregiver on patient safety  Taken 6/27/2024 0833  Free From Fall Injury: Instruct family/caregiver on patient safety     Problem: ABCDS Injury Assessment  Goal: Absence of physical injury  6/28/2024 0016 by Ladi Dey RN  Outcome: Progressing  6/27/2024 1842 by Iris Hart RN  Outcome: Progressing     Problem: Pain  Goal: Verbalizes/displays adequate comfort level or baseline comfort level  6/28/2024 0016 by Ladi Dey RN  Outcome: Progressing  6/27/2024 1842 by Tanner

## 2024-06-28 NOTE — CARE COORDINATION
Pt has a bed offer at [facility]) Post Acute.  Spoke with Sondra [phone] liaison for facility.  Pt's previous insurance precert has .  She will submit a new precert request this afternoon but do not anticipate an update will be available until .  Per chart Pulmonary plans to repeat a cxr on .  CM staff remains available to assist further as needed.

## 2024-06-29 ENCOUNTER — APPOINTMENT (OUTPATIENT)
Dept: GENERAL RADIOLOGY | Age: 89
DRG: 308 | End: 2024-06-29
Payer: MEDICARE

## 2024-06-29 LAB
ANION GAP SERPL CALC-SCNC: 6 MMOL/L (ref 9–18)
APTT PPP: 31.9 SEC (ref 23.3–37.4)
APTT PPP: 35 SEC (ref 23.3–37.4)
BASOPHILS # BLD: 0 K/UL (ref 0–0.2)
BASOPHILS NFR BLD: 0 % (ref 0–2)
BUN SERPL-MCNC: 30 MG/DL (ref 8–23)
CALCIUM SERPL-MCNC: 8.8 MG/DL (ref 8.8–10.2)
CHLORIDE SERPL-SCNC: 96 MMOL/L (ref 98–107)
CO2 SERPL-SCNC: 33 MMOL/L (ref 20–28)
CREAT SERPL-MCNC: 0.71 MG/DL (ref 0.6–1.1)
DIFFERENTIAL METHOD BLD: ABNORMAL
EOSINOPHIL # BLD: 0.1 K/UL (ref 0–0.8)
EOSINOPHIL NFR BLD: 1 % (ref 0.5–7.8)
ERYTHROCYTE [DISTWIDTH] IN BLOOD BY AUTOMATED COUNT: 13.8 % (ref 11.9–14.6)
ERYTHROCYTE [DISTWIDTH] IN BLOOD BY AUTOMATED COUNT: 14 % (ref 11.9–14.6)
GLUCOSE SERPL-MCNC: 166 MG/DL (ref 70–99)
HCT VFR BLD AUTO: 38.2 % (ref 35.8–46.3)
HCT VFR BLD AUTO: 38.3 % (ref 35.8–46.3)
HGB BLD-MCNC: 12.2 G/DL (ref 11.7–15.4)
HGB BLD-MCNC: 12.5 G/DL (ref 11.7–15.4)
IMM GRANULOCYTES # BLD AUTO: 0.1 K/UL (ref 0–0.5)
IMM GRANULOCYTES NFR BLD AUTO: 1 % (ref 0–5)
INR PPP: 2.2
LYMPHOCYTES # BLD: 0.6 K/UL (ref 0.5–4.6)
LYMPHOCYTES NFR BLD: 6 % (ref 13–44)
MAGNESIUM SERPL-MCNC: 2.1 MG/DL (ref 1.8–2.4)
MCH RBC QN AUTO: 31.9 PG (ref 26.1–32.9)
MCH RBC QN AUTO: 32.6 PG (ref 26.1–32.9)
MCHC RBC AUTO-ENTMCNC: 31.9 G/DL (ref 31.4–35)
MCHC RBC AUTO-ENTMCNC: 32.6 G/DL (ref 31.4–35)
MCV RBC AUTO: 100 FL (ref 82–102)
MCV RBC AUTO: 100 FL (ref 82–102)
MONOCYTES # BLD: 0.5 K/UL (ref 0.1–1.3)
MONOCYTES NFR BLD: 5 % (ref 4–12)
NEUTS SEG # BLD: 9.1 K/UL (ref 1.7–8.2)
NEUTS SEG NFR BLD: 87 % (ref 43–78)
NRBC # BLD: 0 K/UL (ref 0–0.2)
NRBC # BLD: 0 K/UL (ref 0–0.2)
PLATELET # BLD AUTO: 223 K/UL (ref 150–450)
PLATELET # BLD AUTO: 237 K/UL (ref 150–450)
PMV BLD AUTO: 10.3 FL (ref 9.4–12.3)
PMV BLD AUTO: 10.3 FL (ref 9.4–12.3)
POTASSIUM SERPL-SCNC: 5.1 MMOL/L (ref 3.5–5.1)
PROTHROMBIN TIME: 25.7 SEC (ref 11.3–14.9)
RBC # BLD AUTO: 3.82 M/UL (ref 4.05–5.2)
RBC # BLD AUTO: 3.83 M/UL (ref 4.05–5.2)
SODIUM SERPL-SCNC: 135 MMOL/L (ref 136–145)
UFH PPP CHRO-ACNC: >1.1 IU/ML (ref 0.3–0.7)
WBC # BLD AUTO: 10.4 K/UL (ref 4.3–11.1)
WBC # BLD AUTO: 8.7 K/UL (ref 4.3–11.1)

## 2024-06-29 PROCEDURE — 99232 SBSQ HOSP IP/OBS MODERATE 35: CPT | Performed by: INTERNAL MEDICINE

## 2024-06-29 PROCEDURE — 85730 THROMBOPLASTIN TIME PARTIAL: CPT

## 2024-06-29 PROCEDURE — 6370000000 HC RX 637 (ALT 250 FOR IP): Performed by: FAMILY MEDICINE

## 2024-06-29 PROCEDURE — 94761 N-INVAS EAR/PLS OXIMETRY MLT: CPT

## 2024-06-29 PROCEDURE — 36415 COLL VENOUS BLD VENIPUNCTURE: CPT

## 2024-06-29 PROCEDURE — 1100000003 HC PRIVATE W/ TELEMETRY

## 2024-06-29 PROCEDURE — 94760 N-INVAS EAR/PLS OXIMETRY 1: CPT

## 2024-06-29 PROCEDURE — 85610 PROTHROMBIN TIME: CPT

## 2024-06-29 PROCEDURE — 6370000000 HC RX 637 (ALT 250 FOR IP): Performed by: INTERNAL MEDICINE

## 2024-06-29 PROCEDURE — 85025 COMPLETE CBC W/AUTO DIFF WBC: CPT

## 2024-06-29 PROCEDURE — 85027 COMPLETE CBC AUTOMATED: CPT

## 2024-06-29 PROCEDURE — 2700000000 HC OXYGEN THERAPY PER DAY

## 2024-06-29 PROCEDURE — 6360000002 HC RX W HCPCS: Performed by: INTERNAL MEDICINE

## 2024-06-29 PROCEDURE — 83735 ASSAY OF MAGNESIUM: CPT

## 2024-06-29 PROCEDURE — 80048 BASIC METABOLIC PNL TOTAL CA: CPT

## 2024-06-29 PROCEDURE — 71045 X-RAY EXAM CHEST 1 VIEW: CPT

## 2024-06-29 PROCEDURE — 85520 HEPARIN ASSAY: CPT

## 2024-06-29 RX ORDER — HEPARIN SODIUM 1000 [USP'U]/ML
40 INJECTION, SOLUTION INTRAVENOUS; SUBCUTANEOUS PRN
Status: DISCONTINUED | OUTPATIENT
Start: 2024-06-29 | End: 2024-06-29 | Stop reason: SDUPTHER

## 2024-06-29 RX ORDER — HEPARIN SODIUM 10000 [USP'U]/100ML
5-30 INJECTION, SOLUTION INTRAVENOUS CONTINUOUS
Status: DISCONTINUED | OUTPATIENT
Start: 2024-06-29 | End: 2024-06-29 | Stop reason: SDUPTHER

## 2024-06-29 RX ORDER — HEPARIN SODIUM 1000 [USP'U]/ML
40 INJECTION, SOLUTION INTRAVENOUS; SUBCUTANEOUS PRN
Status: DISCONTINUED | OUTPATIENT
Start: 2024-06-29 | End: 2024-07-03

## 2024-06-29 RX ORDER — OXYMETAZOLINE HYDROCHLORIDE 0.05 G/100ML
2 SPRAY NASAL 2 TIMES DAILY
Status: COMPLETED | OUTPATIENT
Start: 2024-06-29 | End: 2024-06-30

## 2024-06-29 RX ORDER — HEPARIN SODIUM 1000 [USP'U]/ML
80 INJECTION, SOLUTION INTRAVENOUS; SUBCUTANEOUS ONCE
Status: DISCONTINUED | OUTPATIENT
Start: 2024-06-29 | End: 2024-06-29 | Stop reason: SDUPTHER

## 2024-06-29 RX ORDER — HEPARIN SODIUM 1000 [USP'U]/ML
80 INJECTION, SOLUTION INTRAVENOUS; SUBCUTANEOUS PRN
Status: DISCONTINUED | OUTPATIENT
Start: 2024-06-29 | End: 2024-07-03

## 2024-06-29 RX ORDER — FUROSEMIDE 10 MG/ML
40 INJECTION INTRAMUSCULAR; INTRAVENOUS 2 TIMES DAILY
Status: COMPLETED | OUTPATIENT
Start: 2024-06-29 | End: 2024-07-01

## 2024-06-29 RX ORDER — HEPARIN SODIUM 1000 [USP'U]/ML
80 INJECTION, SOLUTION INTRAVENOUS; SUBCUTANEOUS PRN
Status: DISCONTINUED | OUTPATIENT
Start: 2024-06-29 | End: 2024-06-29 | Stop reason: SDUPTHER

## 2024-06-29 RX ORDER — FUROSEMIDE 10 MG/ML
40 INJECTION INTRAMUSCULAR; INTRAVENOUS DAILY
Status: DISCONTINUED | OUTPATIENT
Start: 2024-06-29 | End: 2024-06-29

## 2024-06-29 RX ORDER — HEPARIN SODIUM 10000 [USP'U]/100ML
5-30 INJECTION, SOLUTION INTRAVENOUS CONTINUOUS
Status: DISPENSED | OUTPATIENT
Start: 2024-06-29 | End: 2024-07-02

## 2024-06-29 RX ADMIN — METOPROLOL SUCCINATE 200 MG: 100 TABLET, EXTENDED RELEASE ORAL at 20:47

## 2024-06-29 RX ADMIN — FERROUS SULFATE TAB 325 MG (65 MG ELEMENTAL FE) 325 MG: 325 (65 FE) TAB at 12:33

## 2024-06-29 RX ADMIN — HEPARIN SODIUM 5100 UNITS: 1000 INJECTION INTRAVENOUS; SUBCUTANEOUS at 18:38

## 2024-06-29 RX ADMIN — DILTIAZEM HYDROCHLORIDE 60 MG: 60 TABLET, FILM COATED ORAL at 05:35

## 2024-06-29 RX ADMIN — DILTIAZEM HYDROCHLORIDE 60 MG: 60 TABLET, FILM COATED ORAL at 00:15

## 2024-06-29 RX ADMIN — DILTIAZEM HYDROCHLORIDE 60 MG: 60 TABLET, FILM COATED ORAL at 12:33

## 2024-06-29 RX ADMIN — APIXABAN 5 MG: 5 TABLET, FILM COATED ORAL at 08:32

## 2024-06-29 RX ADMIN — HEPARIN SODIUM 18 UNITS/KG/HR: 10000 INJECTION, SOLUTION INTRAVENOUS at 10:58

## 2024-06-29 RX ADMIN — SPIRONOLACTONE 25 MG: 25 TABLET ORAL at 08:32

## 2024-06-29 RX ADMIN — DOCUSATE SODIUM 50 MG AND SENNOSIDES 8.6 MG 2 TABLET: 8.6; 5 TABLET, FILM COATED ORAL at 20:47

## 2024-06-29 RX ADMIN — Medication 2 SPRAY: at 04:51

## 2024-06-29 RX ADMIN — Medication 2 SPRAY: at 20:49

## 2024-06-29 RX ADMIN — FUROSEMIDE 40 MG: 10 INJECTION, SOLUTION INTRAMUSCULAR; INTRAVENOUS at 08:29

## 2024-06-29 RX ADMIN — PANTOPRAZOLE SODIUM 40 MG: 40 TABLET, DELAYED RELEASE ORAL at 05:32

## 2024-06-29 RX ADMIN — PANTOPRAZOLE SODIUM 40 MG: 40 TABLET, DELAYED RELEASE ORAL at 17:09

## 2024-06-29 RX ADMIN — LEVOTHYROXINE SODIUM 88 MCG: 0.09 TABLET ORAL at 05:32

## 2024-06-29 RX ADMIN — FUROSEMIDE 40 MG: 10 INJECTION, SOLUTION INTRAMUSCULAR; INTRAVENOUS at 17:10

## 2024-06-29 RX ADMIN — TAMSULOSIN HYDROCHLORIDE 0.4 MG: 0.4 CAPSULE ORAL at 08:32

## 2024-06-29 RX ADMIN — ESCITALOPRAM OXALATE 20 MG: 10 TABLET ORAL at 08:32

## 2024-06-29 RX ADMIN — METOPROLOL SUCCINATE 200 MG: 100 TABLET, EXTENDED RELEASE ORAL at 08:32

## 2024-06-29 RX ADMIN — EMPAGLIFLOZIN 10 MG: 10 TABLET, FILM COATED ORAL at 08:32

## 2024-06-29 NOTE — ICUWATCH
RRT Clinical Rounding Nurse Progress Report      SUBJECTIVE: Patient assessed secondary to RN/provider concern - worsening hypoxia requiring increased O2.      Vitals:    06/29/24 0726 06/29/24 0749 06/29/24 0826 06/29/24 0832   BP:   116/71    Pulse:  93 91 91   Resp:  22 16    Temp:   97.3 °F (36.3 °C)    TempSrc:       SpO2: 91% 95% 96%    Weight:       Height:            ASSESSMENT:  Patient alert and oriented. Respirations shallow, but unlabored. Denies SOB/CP. Lung sounds diminished bilaterally. O2 needs increased this morning (4L NC ? 50L/75% HHFNC). VS, labs, and progress notes reviewed. CXR pending.    PLAN:  Will follow per RRT Clinical Rounding Program protocol.    Damien Potter RN  [facility]: [phone]  [facility]: [phone]

## 2024-06-29 NOTE — ICUWATCH
RRT Clinical Rounding Nurse Update    Vitals:    06/29/24 0826 06/29/24 0832 06/29/24 1019 06/29/24 1233   BP: 116/71   106/81   Pulse: 91 91 78 (!) 118   Resp: 16  15    Temp: 97.3 °F (36.3 °C)      TempSrc:       SpO2: 96%  96%    Weight:       Height:            ASSESSMENT:  Previous outreach assessment was reviewed.  Remains on 50L 50% HHFNC. Has been transitioned to heparin gtt for planned thoracentesis Tuesday.    PLAN:  Will follow per RRT Clinical Rounding Program protocol.    Damien Potter RN  [facility]: [phone]  [facility]: [phone]

## 2024-06-30 LAB
ALBUMIN SERPL-MCNC: 2.5 G/DL (ref 3.2–4.6)
ALBUMIN/GLOB SERPL: 0.9 (ref 1–1.9)
ALP SERPL-CCNC: 126 U/L (ref 35–104)
ALT SERPL-CCNC: 44 U/L (ref 12–65)
ANION GAP SERPL CALC-SCNC: 12 MMOL/L (ref 9–18)
APTT PPP: >200 SEC (ref 23.3–37.4)
ARTERIAL PATENCY WRIST A: ABNORMAL
AST SERPL-CCNC: 27 U/L (ref 15–37)
BASE EXCESS BLDV CALC-SCNC: 9.5 MMOL/L
BILIRUB SERPL-MCNC: 0.9 MG/DL (ref 0–1.2)
BUN SERPL-MCNC: 28 MG/DL (ref 8–23)
CALCIUM SERPL-MCNC: 8.7 MG/DL (ref 8.8–10.2)
CHLORIDE SERPL-SCNC: 94 MMOL/L (ref 98–107)
CO2 SERPL-SCNC: 32 MMOL/L (ref 20–28)
CREAT SERPL-MCNC: 0.86 MG/DL (ref 0.6–1.1)
GAS FLOW.O2 O2 DELIVERY SYS: ABNORMAL
GLOBULIN SER CALC-MCNC: 2.9 G/DL (ref 2.3–3.5)
GLUCOSE SERPL-MCNC: 169 MG/DL (ref 70–99)
HCO3 BLDV-SCNC: 35.1 MMOL/L (ref 23–28)
O2/TOTAL GAS SETTING VFR VENT: 80 %
PCO2 BLDV: 49.7 MMHG (ref 41–51)
PEEP RESPIRATORY: 8 CMH2O
PH BLDV: 7.46 (ref 7.32–7.42)
PO2 BLDV: 38 MMHG
POTASSIUM SERPL-SCNC: 4.2 MMOL/L (ref 3.5–5.1)
PROT SERPL-MCNC: 5.4 G/DL (ref 6.3–8.2)
RESPIRATORY RATE, POC: 29 (ref 5–40)
SAO2 % BLDV: 73.9 % (ref 65–88)
SERVICE CMNT-IMP: ABNORMAL
SODIUM SERPL-SCNC: 138 MMOL/L (ref 136–145)
SPECIMEN TYPE: ABNORMAL
UFH PPP CHRO-ACNC: >1.1 IU/ML (ref 0.3–0.7)
VENTILATION MODE VENT: ABNORMAL

## 2024-06-30 PROCEDURE — 6370000000 HC RX 637 (ALT 250 FOR IP): Performed by: INTERNAL MEDICINE

## 2024-06-30 PROCEDURE — 80053 COMPREHEN METABOLIC PANEL: CPT

## 2024-06-30 PROCEDURE — 94660 CPAP INITIATION&MGMT: CPT

## 2024-06-30 PROCEDURE — 5A09457 ASSISTANCE WITH RESPIRATORY VENTILATION, 24-96 CONSECUTIVE HOURS, CONTINUOUS POSITIVE AIRWAY PRESSURE: ICD-10-PCS | Performed by: INTERNAL MEDICINE

## 2024-06-30 PROCEDURE — 6360000002 HC RX W HCPCS: Performed by: INTERNAL MEDICINE

## 2024-06-30 PROCEDURE — 2580000003 HC RX 258: Performed by: INTERNAL MEDICINE

## 2024-06-30 PROCEDURE — 94761 N-INVAS EAR/PLS OXIMETRY MLT: CPT

## 2024-06-30 PROCEDURE — 85730 THROMBOPLASTIN TIME PARTIAL: CPT

## 2024-06-30 PROCEDURE — 82803 BLOOD GASES ANY COMBINATION: CPT

## 2024-06-30 PROCEDURE — 2000000000 HC ICU R&B

## 2024-06-30 PROCEDURE — 85520 HEPARIN ASSAY: CPT

## 2024-06-30 PROCEDURE — 2500000003 HC RX 250 WO HCPCS: Performed by: INTERNAL MEDICINE

## 2024-06-30 PROCEDURE — 6370000000 HC RX 637 (ALT 250 FOR IP): Performed by: FAMILY MEDICINE

## 2024-06-30 PROCEDURE — 2500000003 HC RX 250 WO HCPCS: Performed by: CASE MANAGER/CARE COORDINATOR

## 2024-06-30 PROCEDURE — 99291 CRITICAL CARE FIRST HOUR: CPT | Performed by: INTERNAL MEDICINE

## 2024-06-30 PROCEDURE — 36415 COLL VENOUS BLD VENIPUNCTURE: CPT

## 2024-06-30 PROCEDURE — 2700000000 HC OXYGEN THERAPY PER DAY

## 2024-06-30 PROCEDURE — 99232 SBSQ HOSP IP/OBS MODERATE 35: CPT | Performed by: INTERNAL MEDICINE

## 2024-06-30 PROCEDURE — 2500000003 HC RX 250 WO HCPCS: Performed by: NURSE PRACTITIONER

## 2024-06-30 RX ORDER — METOPROLOL SUCCINATE 100 MG/1
100 TABLET, EXTENDED RELEASE ORAL EVERY 12 HOURS
Status: DISCONTINUED | OUTPATIENT
Start: 2024-06-30 | End: 2024-07-04

## 2024-06-30 RX ORDER — DILTIAZEM HYDROCHLORIDE 5 MG/ML
10 INJECTION INTRAVENOUS ONCE
Status: COMPLETED | OUTPATIENT
Start: 2024-06-30 | End: 2024-06-30

## 2024-06-30 RX ORDER — METOPROLOL TARTRATE 1 MG/ML
5 INJECTION, SOLUTION INTRAVENOUS ONCE
Status: COMPLETED | OUTPATIENT
Start: 2024-06-30 | End: 2024-06-30

## 2024-06-30 RX ADMIN — DOCUSATE SODIUM 50 MG AND SENNOSIDES 8.6 MG 2 TABLET: 8.6; 5 TABLET, FILM COATED ORAL at 21:55

## 2024-06-30 RX ADMIN — Medication 2 SPRAY: at 10:11

## 2024-06-30 RX ADMIN — FUROSEMIDE 40 MG: 10 INJECTION, SOLUTION INTRAMUSCULAR; INTRAVENOUS at 17:53

## 2024-06-30 RX ADMIN — METOPROLOL SUCCINATE 100 MG: 100 TABLET, EXTENDED RELEASE ORAL at 11:09

## 2024-06-30 RX ADMIN — Medication 2 SPRAY: at 21:55

## 2024-06-30 RX ADMIN — SODIUM CHLORIDE 5 MG/HR: 900 INJECTION, SOLUTION INTRAVENOUS at 08:34

## 2024-06-30 RX ADMIN — TAMSULOSIN HYDROCHLORIDE 0.4 MG: 0.4 CAPSULE ORAL at 10:08

## 2024-06-30 RX ADMIN — AMIODARONE HYDROCHLORIDE 1 MG/MIN: 50 INJECTION, SOLUTION INTRAVENOUS at 11:40

## 2024-06-30 RX ADMIN — METOROPROLOL TARTRATE 5 MG: 5 INJECTION, SOLUTION INTRAVENOUS at 09:41

## 2024-06-30 RX ADMIN — AMIODARONE HYDROCHLORIDE 150 MG: 50 INJECTION, SOLUTION INTRAVENOUS at 11:29

## 2024-06-30 RX ADMIN — HEPARIN SODIUM 19 UNITS/KG/HR: 10000 INJECTION, SOLUTION INTRAVENOUS at 07:26

## 2024-06-30 RX ADMIN — LEVOTHYROXINE SODIUM 88 MCG: 0.09 TABLET ORAL at 06:27

## 2024-06-30 RX ADMIN — FERROUS SULFATE TAB 325 MG (65 MG ELEMENTAL FE) 325 MG: 325 (65 FE) TAB at 11:49

## 2024-06-30 RX ADMIN — POTASSIUM CHLORIDE 20 MEQ: 1500 TABLET, EXTENDED RELEASE ORAL at 11:49

## 2024-06-30 RX ADMIN — EMPAGLIFLOZIN 10 MG: 10 TABLET, FILM COATED ORAL at 10:08

## 2024-06-30 RX ADMIN — FUROSEMIDE 40 MG: 10 INJECTION, SOLUTION INTRAMUSCULAR; INTRAVENOUS at 10:02

## 2024-06-30 RX ADMIN — METOPROLOL SUCCINATE 100 MG: 100 TABLET, EXTENDED RELEASE ORAL at 21:55

## 2024-06-30 RX ADMIN — PANTOPRAZOLE SODIUM 40 MG: 40 TABLET, DELAYED RELEASE ORAL at 06:27

## 2024-06-30 RX ADMIN — DILTIAZEM HYDROCHLORIDE 10 MG: 5 INJECTION, SOLUTION INTRAVENOUS at 03:15

## 2024-06-30 RX ADMIN — AMIODARONE HYDROCHLORIDE 1 MG/MIN: 50 INJECTION, SOLUTION INTRAVENOUS at 19:20

## 2024-06-30 RX ADMIN — METOPROLOL TARTRATE 5 MG: 5 INJECTION INTRAVENOUS at 06:31

## 2024-06-30 RX ADMIN — ESCITALOPRAM OXALATE 20 MG: 10 TABLET ORAL at 10:08

## 2024-06-30 ASSESSMENT — PAIN SCALES - GENERAL
PAINLEVEL_OUTOF10: 0

## 2024-06-30 NOTE — ICUWATCH
RRT Clinical Rounding Nurse Update    Vitals:    06/30/24 0309 06/30/24 0330 06/30/24 0342 06/30/24 0355   BP: 103/82 112/68 102/75 94/79   Pulse: (!) 137 (!) 121 (!) 124 (!) 103   Resp:       Temp:       TempSrc:       SpO2: 94% 92% 91% (!) 88%   Weight:       Height:            DETERIORATION INDEX SCORE: 54    ASSESSMENT:  Previous outreach assessment was reviewed.  AF -130's, given 10mg Cardizem push. Borderline BP's, otherwise VSS.    PLAN:  Will follow per RRT Clinical Rounding Program protocol.    Abad Nair RN  [facility]: [phone]  [facility]: [phone]

## 2024-06-30 NOTE — ICUWATCH
RRT Clinical Rounding Nurse Update    Vitals:    06/30/24 0636 06/30/24 0715 06/30/24 0808 06/30/24 0834   BP: 101/76  100/84 105/79   Pulse:   99    Resp:  21 18    Temp:   97.5 °F (36.4 °C)    TempSrc:   Oral    SpO2:   95%    Weight:       Height:            ASSESSMENT:  Previous outreach assessment was reviewed.  Patient alert and oriented. Appears weak. Respirations unlabored, but O2 needs have increased to 50L 80% HHFNC. Afib RVR 110s on bedside monitor. VS, labs, and progress notes reviewed. Discussed with primary RN. Obtained additional vascular access with US guided IV placement.      PLAN:  Will follow per RRT Clinical Rounding Program protocol.    Damien Potter RN  [facility] [place]: [phone]  [facility]: [phone]

## 2024-06-30 NOTE — ICUWATCH
RRT Clinical Rounding Nurse Progress Report        Vitals:    06/29/24 1710 06/29/24 1931 06/29/24 1935 06/29/24 2047   BP: 94/69  (!) 104/92 (!) 104/92   Pulse:   98    Resp:  18 16    Temp:   97.9 °F (36.6 °C)    TempSrc:       SpO2:   96%    Weight:       Height:            DETERIORATION INDEX SCORE: 39    ASSESSMENT:  Previous outreach assessment reviewed. Pt is resting in bed, eyes closed at time of exam. Unlabored, regular breathing on 40L/40%. Telemetry monitor showing AF 's. Heparin gtt infusing. Spoke with primary RN, no major concerns at this time. VSS.    PLAN:  Will follow per RRT Clinical Rounding Program protocol.    Abad Nair RN  [facility]: [phone]  [facility]: [phone]

## 2024-07-01 PROBLEM — J90 PLEURAL EFFUSION: Status: ACTIVE | Noted: 2024-06-17

## 2024-07-01 PROBLEM — R73.9 HYPERGLYCEMIA: Status: ACTIVE | Noted: 2024-07-01

## 2024-07-01 LAB
ANION GAP SERPL CALC-SCNC: 9 MMOL/L (ref 9–18)
APTT PPP: 131 SEC (ref 23.3–37.4)
APTT PPP: 50.2 SEC (ref 23.3–37.4)
APTT PPP: >200 SEC (ref 23.3–37.4)
ARTERIAL PATENCY WRIST A: POSITIVE
BASE EXCESS BLD CALC-SCNC: 12.7 MMOL/L
BDY SITE: ABNORMAL
BUN SERPL-MCNC: 32 MG/DL (ref 8–23)
CALCIUM SERPL-MCNC: 8 MG/DL (ref 8.8–10.2)
CHLORIDE SERPL-SCNC: 91 MMOL/L (ref 98–107)
CO2 SERPL-SCNC: 33 MMOL/L (ref 20–28)
CREAT SERPL-MCNC: 1.12 MG/DL (ref 0.6–1.1)
ERYTHROCYTE [DISTWIDTH] IN BLOOD BY AUTOMATED COUNT: 13.9 % (ref 11.9–14.6)
EST. AVERAGE GLUCOSE BLD GHB EST-MCNC: 160 MG/DL
GAS FLOW.O2 O2 DELIVERY SYS: ABNORMAL
GLUCOSE BLD STRIP.AUTO-MCNC: 162 MG/DL (ref 65–100)
GLUCOSE BLD STRIP.AUTO-MCNC: 173 MG/DL (ref 65–100)
GLUCOSE SERPL-MCNC: 212 MG/DL (ref 70–99)
HBA1C MFR BLD: 7.2 % (ref 0–5.6)
HCO3 BLD-SCNC: 38.6 MMOL/L (ref 22–26)
HCT VFR BLD AUTO: 36.9 % (ref 35.8–46.3)
HGB BLD-MCNC: 11.9 G/DL (ref 11.7–15.4)
INSPIRATION.DURATION SETTING TIME VENT: 0.9 SEC
IPAP/PIP/HIGH PEEP: 14
MCH RBC QN AUTO: 31.7 PG (ref 26.1–32.9)
MCHC RBC AUTO-ENTMCNC: 32.2 G/DL (ref 31.4–35)
MCV RBC AUTO: 98.4 FL (ref 82–102)
NRBC # BLD: 0 K/UL (ref 0–0.2)
O2/TOTAL GAS SETTING VFR VENT: 70 %
PCO2 BLD: 53.1 MMHG (ref 35–45)
PH BLD: 7.47 (ref 7.35–7.45)
PLATELET # BLD AUTO: 245 K/UL (ref 150–450)
PMV BLD AUTO: 10.1 FL (ref 9.4–12.3)
PO2 BLD: 156 MMHG (ref 75–100)
POTASSIUM SERPL-SCNC: 4.2 MMOL/L (ref 3.5–5.1)
PRESSURE SUPPORT SETTING VENT: 6 CMH2O
RBC # BLD AUTO: 3.75 M/UL (ref 4.05–5.2)
RESPIRATORY RATE, POC: 20 (ref 5–40)
SAO2 % BLD: 99.5 % (ref 95–98)
SERVICE CMNT-IMP: ABNORMAL
SODIUM SERPL-SCNC: 132 MMOL/L (ref 136–145)
SPECIMEN TYPE: ABNORMAL
VENTILATION MODE VENT: ABNORMAL
WBC # BLD AUTO: 12.8 K/UL (ref 4.3–11.1)

## 2024-07-01 PROCEDURE — 99232 SBSQ HOSP IP/OBS MODERATE 35: CPT | Performed by: INTERNAL MEDICINE

## 2024-07-01 PROCEDURE — 6360000002 HC RX W HCPCS: Performed by: INTERNAL MEDICINE

## 2024-07-01 PROCEDURE — 80048 BASIC METABOLIC PNL TOTAL CA: CPT

## 2024-07-01 PROCEDURE — 2000000000 HC ICU R&B

## 2024-07-01 PROCEDURE — 6370000000 HC RX 637 (ALT 250 FOR IP): Performed by: FAMILY MEDICINE

## 2024-07-01 PROCEDURE — 85027 COMPLETE CBC AUTOMATED: CPT

## 2024-07-01 PROCEDURE — 2580000003 HC RX 258: Performed by: NURSE PRACTITIONER

## 2024-07-01 PROCEDURE — 6370000000 HC RX 637 (ALT 250 FOR IP): Performed by: INTERNAL MEDICINE

## 2024-07-01 PROCEDURE — 85730 THROMBOPLASTIN TIME PARTIAL: CPT

## 2024-07-01 PROCEDURE — 36600 WITHDRAWAL OF ARTERIAL BLOOD: CPT

## 2024-07-01 PROCEDURE — 36415 COLL VENOUS BLD VENIPUNCTURE: CPT

## 2024-07-01 PROCEDURE — 82962 GLUCOSE BLOOD TEST: CPT

## 2024-07-01 PROCEDURE — 82803 BLOOD GASES ANY COMBINATION: CPT

## 2024-07-01 PROCEDURE — 2580000003 HC RX 258: Performed by: INTERNAL MEDICINE

## 2024-07-01 PROCEDURE — 6360000002 HC RX W HCPCS: Performed by: NURSE PRACTITIONER

## 2024-07-01 PROCEDURE — 83036 HEMOGLOBIN GLYCOSYLATED A1C: CPT

## 2024-07-01 RX ORDER — INSULIN LISPRO 100 [IU]/ML
0-4 INJECTION, SOLUTION INTRAVENOUS; SUBCUTANEOUS
Status: DISCONTINUED | OUTPATIENT
Start: 2024-07-01 | End: 2024-07-04

## 2024-07-01 RX ORDER — MINERAL OIL AND WHITE PETROLATUM 150; 830 MG/G; MG/G
OINTMENT OPHTHALMIC PRN
Status: DISCONTINUED | OUTPATIENT
Start: 2024-07-01 | End: 2024-07-09 | Stop reason: HOSPADM

## 2024-07-01 RX ORDER — INSULIN LISPRO 100 [IU]/ML
0-4 INJECTION, SOLUTION INTRAVENOUS; SUBCUTANEOUS NIGHTLY
Status: DISCONTINUED | OUTPATIENT
Start: 2024-07-01 | End: 2024-07-04

## 2024-07-01 RX ORDER — DEXTROSE MONOHYDRATE 100 MG/ML
INJECTION, SOLUTION INTRAVENOUS CONTINUOUS PRN
Status: DISCONTINUED | OUTPATIENT
Start: 2024-07-01 | End: 2024-07-09 | Stop reason: HOSPADM

## 2024-07-01 RX ORDER — IBUPROFEN 600 MG/1
1 TABLET ORAL PRN
Status: DISCONTINUED | OUTPATIENT
Start: 2024-07-01 | End: 2024-07-09 | Stop reason: HOSPADM

## 2024-07-01 RX ADMIN — FUROSEMIDE 40 MG: 10 INJECTION, SOLUTION INTRAMUSCULAR; INTRAVENOUS at 08:30

## 2024-07-01 RX ADMIN — ESCITALOPRAM OXALATE 20 MG: 10 TABLET ORAL at 08:30

## 2024-07-01 RX ADMIN — WHITE PETROLATUM 57.7 %-MINERAL OIL 31.9 % EYE OINTMENT: at 15:06

## 2024-07-01 RX ADMIN — AMIODARONE HYDROCHLORIDE 0.5 MG/MIN: 50 INJECTION, SOLUTION INTRAVENOUS at 16:38

## 2024-07-01 RX ADMIN — POTASSIUM CHLORIDE 20 MEQ: 1500 TABLET, EXTENDED RELEASE ORAL at 08:30

## 2024-07-01 RX ADMIN — PANTOPRAZOLE SODIUM 40 MG: 40 TABLET, DELAYED RELEASE ORAL at 15:08

## 2024-07-01 RX ADMIN — METOPROLOL SUCCINATE 100 MG: 100 TABLET, EXTENDED RELEASE ORAL at 21:49

## 2024-07-01 RX ADMIN — POLYETHYLENE GLYCOL 3350 17 G: 17 POWDER, FOR SOLUTION ORAL at 08:30

## 2024-07-01 RX ADMIN — LEVOTHYROXINE SODIUM 88 MCG: 0.09 TABLET ORAL at 07:20

## 2024-07-01 RX ADMIN — POTASSIUM CHLORIDE 20 MEQ: 1500 TABLET, EXTENDED RELEASE ORAL at 15:38

## 2024-07-01 RX ADMIN — DOCUSATE SODIUM 50 MG AND SENNOSIDES 8.6 MG 2 TABLET: 8.6; 5 TABLET, FILM COATED ORAL at 08:30

## 2024-07-01 RX ADMIN — DOCUSATE SODIUM 50 MG AND SENNOSIDES 8.6 MG 2 TABLET: 8.6; 5 TABLET, FILM COATED ORAL at 21:49

## 2024-07-01 RX ADMIN — HEPARIN SODIUM 10 UNITS/KG/HR: 10000 INJECTION, SOLUTION INTRAVENOUS at 15:06

## 2024-07-01 RX ADMIN — SPIRONOLACTONE 25 MG: 25 TABLET ORAL at 08:30

## 2024-07-01 RX ADMIN — AMIODARONE HYDROCHLORIDE 1 MG/MIN: 50 INJECTION, SOLUTION INTRAVENOUS at 04:04

## 2024-07-01 RX ADMIN — PANTOPRAZOLE SODIUM 40 MG: 40 TABLET, DELAYED RELEASE ORAL at 07:20

## 2024-07-01 RX ADMIN — FERROUS SULFATE TAB 325 MG (65 MG ELEMENTAL FE) 325 MG: 325 (65 FE) TAB at 11:44

## 2024-07-01 RX ADMIN — TAMSULOSIN HYDROCHLORIDE 0.4 MG: 0.4 CAPSULE ORAL at 08:30

## 2024-07-01 RX ADMIN — EMPAGLIFLOZIN 10 MG: 10 TABLET, FILM COATED ORAL at 08:30

## 2024-07-01 RX ADMIN — HEPARIN SODIUM 2500 UNITS: 1000 INJECTION INTRAVENOUS; SUBCUTANEOUS at 15:34

## 2024-07-01 ASSESSMENT — PAIN SCALES - GENERAL
PAINLEVEL_OUTOF10: 0

## 2024-07-01 NOTE — INTERDISCIPLINARY ROUNDS
Multi-D Rounds/Checklist (leapfrog):  Lines: can any be removed?: None    Urinary Catheter 06/22/24 (Active)      DVT Prophylaxis: Ordered  Vent: N/A  Nutrition Ordered/appropriate: Ordered  Can antibiotics or other drugs be stopped? N/A Yes/No  Inpat Anti-Infectives (From admission, onward)      None          Consults needed: None  A: Is pain control adequate? (has PRNs? Stop drip?) Yes  B: Sedation break and SBT? N/A  C: Is sedation choice appropriate? N/A  D: Delirium/CAM-ICU? No  E: Mobility goals/appropriateness? Yes  F: Family update and plan? children are surrogate decision makers and are being updated daily by primary attending and nursing staff.    Lori Ivan, APRN - CNP

## 2024-07-01 NOTE — CARE COORDINATION
Chart reviewed and pt discussed in am IDR s/p tx to ICU from tele. Airvo 70% fio2. Amio and heparin gtt. Thoracentesis for Tuesday per MD notes. [facility] POC is d/c to [facility] when stable. Will need new precert most likely prior to d/c. CM following. LOS 14 days.    No Exposure

## 2024-07-02 PROBLEM — Z98.890 S/P THORACENTESIS: Status: ACTIVE | Noted: 2024-07-02

## 2024-07-02 PROBLEM — J90 PLEURAL EFFUSION: Status: RESOLVED | Noted: 2024-06-17 | Resolved: 2024-07-02

## 2024-07-02 LAB
ALBUMIN SERPL-MCNC: 1.8 G/DL (ref 3.2–4.6)
ALBUMIN/GLOB SERPL: 0.8 (ref 1–1.9)
ALP SERPL-CCNC: 95 U/L (ref 35–104)
ALT SERPL-CCNC: 30 U/L (ref 12–65)
ANION GAP SERPL CALC-SCNC: 9 MMOL/L (ref 9–18)
APPEARANCE FLD: NORMAL
ARTERIAL PATENCY WRIST A: POSITIVE
AST SERPL-CCNC: 20 U/L (ref 15–37)
BASE EXCESS BLD CALC-SCNC: 12.7 MMOL/L
BDY SITE: ABNORMAL
BILIRUB DIRECT SERPL-MCNC: 0.2 MG/DL (ref 0–0.4)
BILIRUB SERPL-MCNC: 0.6 MG/DL (ref 0–1.2)
BUN SERPL-MCNC: 34 MG/DL (ref 8–23)
CALCIUM SERPL-MCNC: 7.8 MG/DL (ref 8.8–10.2)
CHLORIDE SERPL-SCNC: 93 MMOL/L (ref 98–107)
CO2 SERPL-SCNC: 32 MMOL/L (ref 20–28)
COLOR FLD: YELLOW
CREAT SERPL-MCNC: 1 MG/DL (ref 0.6–1.1)
ERYTHROCYTE [DISTWIDTH] IN BLOOD BY AUTOMATED COUNT: 13.8 % (ref 11.9–14.6)
GAS FLOW.O2 O2 DELIVERY SYS: ABNORMAL
GLOBULIN SER CALC-MCNC: 2.3 G/DL (ref 2.3–3.5)
GLUCOSE BLD STRIP.AUTO-MCNC: 135 MG/DL (ref 65–100)
GLUCOSE BLD STRIP.AUTO-MCNC: 139 MG/DL (ref 65–100)
GLUCOSE BLD STRIP.AUTO-MCNC: 145 MG/DL (ref 65–100)
GLUCOSE BLD STRIP.AUTO-MCNC: 204 MG/DL (ref 65–100)
GLUCOSE FLD-MCNC: 180 MG/DL
GLUCOSE SERPL-MCNC: 150 MG/DL (ref 70–99)
HCO3 BLD-SCNC: 37.9 MMOL/L (ref 22–26)
HCT VFR BLD AUTO: 33.9 % (ref 35.8–46.3)
HGB BLD-MCNC: 11.2 G/DL (ref 11.7–15.4)
LACTATE SERPL-SCNC: 1.6 MMOL/L (ref 0.5–2)
LDH FLD L TO P-CCNC: 161 U/L
LYMPHOCYTES NFR BRONCH MANUAL: 34 %
MACROPHAGES NFR BRONCH MANUAL: 30 %
MCH RBC QN AUTO: 32.4 PG (ref 26.1–32.9)
MCHC RBC AUTO-ENTMCNC: 33 G/DL (ref 31.4–35)
MCV RBC AUTO: 98 FL (ref 82–102)
NEUTROPHILS NFR BRONCH MANUAL: 36 %
NRBC # BLD: 0 K/UL (ref 0–0.2)
NUC CELL # FLD: 219 /CU MM
PCO2 BLD: 49.2 MMHG (ref 35–45)
PH BLD: 7.5 (ref 7.35–7.45)
PLATELET # BLD AUTO: 170 K/UL (ref 150–450)
PMV BLD AUTO: 11.1 FL (ref 9.4–12.3)
PO2 BLD: 78 MMHG (ref 75–100)
POTASSIUM SERPL-SCNC: 3.8 MMOL/L (ref 3.5–5.1)
PROT FLD-MCNC: 1.8 G/DL
PROT SERPL-MCNC: 4.2 G/DL (ref 6.3–8.2)
RBC # BLD AUTO: 3.46 M/UL (ref 4.05–5.2)
RBC # FLD: <1000 /CU MM
SAO2 % BLD: 96.1 % (ref 95–98)
SERVICE CMNT-IMP: ABNORMAL
SODIUM SERPL-SCNC: 134 MMOL/L (ref 136–145)
SPECIMEN SOURCE FLD: NORMAL
SPECIMEN TYPE: ABNORMAL
WBC # BLD AUTO: 10 K/UL (ref 4.3–11.1)

## 2024-07-02 PROCEDURE — 3609027000 HC THORACENTESIS W/ULTRASOUND: Performed by: INTERNAL MEDICINE

## 2024-07-02 PROCEDURE — 89050 BODY FLUID CELL COUNT: CPT

## 2024-07-02 PROCEDURE — 99232 SBSQ HOSP IP/OBS MODERATE 35: CPT | Performed by: INTERNAL MEDICINE

## 2024-07-02 PROCEDURE — 6360000002 HC RX W HCPCS: Performed by: FAMILY MEDICINE

## 2024-07-02 PROCEDURE — 6370000000 HC RX 637 (ALT 250 FOR IP): Performed by: INTERNAL MEDICINE

## 2024-07-02 PROCEDURE — 87206 SMEAR FLUORESCENT/ACID STAI: CPT

## 2024-07-02 PROCEDURE — C1729 CATH, DRAINAGE: HCPCS | Performed by: INTERNAL MEDICINE

## 2024-07-02 PROCEDURE — 83615 LACTATE (LD) (LDH) ENZYME: CPT

## 2024-07-02 PROCEDURE — 82945 GLUCOSE OTHER FLUID: CPT

## 2024-07-02 PROCEDURE — 36415 COLL VENOUS BLD VENIPUNCTURE: CPT

## 2024-07-02 PROCEDURE — 87070 CULTURE OTHR SPECIMN AEROBIC: CPT

## 2024-07-02 PROCEDURE — 0W993ZZ DRAINAGE OF RIGHT PLEURAL CAVITY, PERCUTANEOUS APPROACH: ICD-10-PCS | Performed by: INTERNAL MEDICINE

## 2024-07-02 PROCEDURE — 87116 MYCOBACTERIA CULTURE: CPT

## 2024-07-02 PROCEDURE — 94660 CPAP INITIATION&MGMT: CPT

## 2024-07-02 PROCEDURE — 2709999900 HC NON-CHARGEABLE SUPPLY: Performed by: INTERNAL MEDICINE

## 2024-07-02 PROCEDURE — 82962 GLUCOSE BLOOD TEST: CPT

## 2024-07-02 PROCEDURE — 2700000000 HC OXYGEN THERAPY PER DAY

## 2024-07-02 PROCEDURE — 82803 BLOOD GASES ANY COMBINATION: CPT

## 2024-07-02 PROCEDURE — 87102 FUNGUS ISOLATION CULTURE: CPT

## 2024-07-02 PROCEDURE — 88112 CYTOPATH CELL ENHANCE TECH: CPT

## 2024-07-02 PROCEDURE — 6360000002 HC RX W HCPCS: Performed by: INTERNAL MEDICINE

## 2024-07-02 PROCEDURE — 83605 ASSAY OF LACTIC ACID: CPT

## 2024-07-02 PROCEDURE — 6370000000 HC RX 637 (ALT 250 FOR IP): Performed by: FAMILY MEDICINE

## 2024-07-02 PROCEDURE — 32555 ASPIRATE PLEURA W/ IMAGING: CPT | Performed by: INTERNAL MEDICINE

## 2024-07-02 PROCEDURE — 97535 SELF CARE MNGMENT TRAINING: CPT

## 2024-07-02 PROCEDURE — 2580000003 HC RX 258: Performed by: FAMILY MEDICINE

## 2024-07-02 PROCEDURE — 87205 SMEAR GRAM STAIN: CPT

## 2024-07-02 PROCEDURE — 2000000000 HC ICU R&B

## 2024-07-02 PROCEDURE — 84157 ASSAY OF PROTEIN OTHER: CPT

## 2024-07-02 PROCEDURE — 88305 TISSUE EXAM BY PATHOLOGIST: CPT

## 2024-07-02 PROCEDURE — 85027 COMPLETE CBC AUTOMATED: CPT

## 2024-07-02 PROCEDURE — 36600 WITHDRAWAL OF ARTERIAL BLOOD: CPT

## 2024-07-02 PROCEDURE — 2580000003 HC RX 258: Performed by: INTERNAL MEDICINE

## 2024-07-02 PROCEDURE — 80076 HEPATIC FUNCTION PANEL: CPT

## 2024-07-02 PROCEDURE — 97168 OT RE-EVAL EST PLAN CARE: CPT

## 2024-07-02 PROCEDURE — 76604 US EXAM CHEST: CPT | Performed by: INTERNAL MEDICINE

## 2024-07-02 PROCEDURE — 80048 BASIC METABOLIC PNL TOTAL CA: CPT

## 2024-07-02 PROCEDURE — 97112 NEUROMUSCULAR REEDUCATION: CPT

## 2024-07-02 PROCEDURE — 97164 PT RE-EVAL EST PLAN CARE: CPT

## 2024-07-02 PROCEDURE — 99291 CRITICAL CARE FIRST HOUR: CPT | Performed by: INTERNAL MEDICINE

## 2024-07-02 PROCEDURE — 97530 THERAPEUTIC ACTIVITIES: CPT

## 2024-07-02 RX ORDER — FUROSEMIDE 10 MG/ML
40 INJECTION INTRAMUSCULAR; INTRAVENOUS
Status: COMPLETED | OUTPATIENT
Start: 2024-07-02 | End: 2024-07-02

## 2024-07-02 RX ADMIN — ESCITALOPRAM OXALATE 20 MG: 10 TABLET ORAL at 09:04

## 2024-07-02 RX ADMIN — INSULIN LISPRO 1 UNITS: 100 INJECTION, SOLUTION INTRAVENOUS; SUBCUTANEOUS at 11:49

## 2024-07-02 RX ADMIN — PANTOPRAZOLE SODIUM 40 MG: 40 TABLET, DELAYED RELEASE ORAL at 09:01

## 2024-07-02 RX ADMIN — APIXABAN 2.5 MG: 2.5 TABLET, FILM COATED ORAL at 20:52

## 2024-07-02 RX ADMIN — DOCUSATE SODIUM 50 MG AND SENNOSIDES 8.6 MG 2 TABLET: 8.6; 5 TABLET, FILM COATED ORAL at 09:01

## 2024-07-02 RX ADMIN — POLYETHYLENE GLYCOL 3350 17 G: 17 POWDER, FOR SOLUTION ORAL at 09:05

## 2024-07-02 RX ADMIN — SPIRONOLACTONE 25 MG: 25 TABLET ORAL at 09:02

## 2024-07-02 RX ADMIN — AMIODARONE HYDROCHLORIDE 0.5 MG/MIN: 50 INJECTION, SOLUTION INTRAVENOUS at 09:01

## 2024-07-02 RX ADMIN — LEVOTHYROXINE SODIUM 88 MCG: 0.09 TABLET ORAL at 09:05

## 2024-07-02 RX ADMIN — POTASSIUM BICARBONATE 20 MEQ: 782 TABLET, EFFERVESCENT ORAL at 09:05

## 2024-07-02 RX ADMIN — EMPAGLIFLOZIN 10 MG: 10 TABLET, FILM COATED ORAL at 09:05

## 2024-07-02 RX ADMIN — SODIUM CHLORIDE, PRESERVATIVE FREE 40 MG: 5 INJECTION INTRAVENOUS at 16:45

## 2024-07-02 RX ADMIN — METOPROLOL SUCCINATE 100 MG: 100 TABLET, EXTENDED RELEASE ORAL at 10:43

## 2024-07-02 RX ADMIN — FUROSEMIDE 40 MG: 10 INJECTION, SOLUTION INTRAMUSCULAR; INTRAVENOUS at 11:36

## 2024-07-02 ASSESSMENT — PAIN SCALES - GENERAL
PAINLEVEL_OUTOF10: 0

## 2024-07-02 NOTE — PROCEDURES
THORACENTESIS   7/2/2024    Indication/Preprocedure diagnosis: right Pleural effusion  Volume of fluid withdrawn: 250ml  Postprocedural Diagnosis:  Pleural effusion    After obtaining informed consent the patient was placed sitting up on the side of the bed and using ultrasound guidance the pleural fluid was located on the right side  and marked.  The diaphragm and atelectatic lung were clearly visualized.  The patient's skin was cleaned with ChloraPrep.  Using sterile technique the skin was anesthetized with 10mL of 1% Xylocaine and a stab wound made and a catheter inserted into the pleural space with 250 cc of clear yellow-appearing fluid was removed.    The patient tolerated the procedure well.  The pleural fluid is sent for diagnostic studies (see orders).  Ultrasound revealed no evidence of pneumothorax. There where no complications and negligible blood loss.       POC ULTRASOUND TO ASSESS FOR PNEUMOTHORAX  7/2/2024    Ultrasound was performed and revealed no evidence of pneumothorax. There was normal lung sliding apparent at the right apex of the lung.  Images will be saved in media.    Maris Grove MD

## 2024-07-02 NOTE — INTERDISCIPLINARY ROUNDS
Multi-D Rounds/Checklist (leapfrog):  Lines: can any be removed?: None    Urinary Catheter 06/22/24 (Active)      DVT Prophylaxis: Ordered but on hold for procedure  Vent: N/A  Nutrition Ordered/appropriate: Ordered  Can antibiotics or other drugs be stopped? N/A Yes/No  Inpat Anti-Infectives (From admission, onward)      None          Consults needed: None  A: Is pain control adequate? (has PRNs? Stop drip?) Yes  B: Sedation break and SBT? N/A  C: Is sedation choice appropriate? N/A  D: Delirium/CAM-ICU? No  E: Mobility goals/appropriateness? Yes  F: Family update and plan? Daughter is surrogate decision maker and is being updated daily by primary attending and nursing staff.    Lori Ivan, APRN - CNP

## 2024-07-03 ENCOUNTER — APPOINTMENT (OUTPATIENT)
Dept: GENERAL RADIOLOGY | Age: 89
DRG: 308 | End: 2024-07-03
Payer: MEDICARE

## 2024-07-03 PROBLEM — R06.02 SHORTNESS OF BREATH: Status: ACTIVE | Noted: 2024-07-03

## 2024-07-03 PROBLEM — R54 FRAILTY: Status: ACTIVE | Noted: 2024-07-03

## 2024-07-03 PROBLEM — Z71.89 ADVANCED CARE PLANNING/COUNSELING DISCUSSION: Status: ACTIVE | Noted: 2024-07-03

## 2024-07-03 PROBLEM — Z51.5 ENCOUNTER FOR PALLIATIVE CARE: Status: ACTIVE | Noted: 2024-07-03

## 2024-07-03 LAB
ALBUMIN SERPL-MCNC: 2.5 G/DL (ref 3.2–4.6)
ALBUMIN/GLOB SERPL: 0.8 (ref 1–1.9)
ALP SERPL-CCNC: 128 U/L (ref 35–104)
ALT SERPL-CCNC: 35 U/L (ref 12–65)
ANION GAP SERPL CALC-SCNC: 13 MMOL/L (ref 9–18)
AST SERPL-CCNC: 23 U/L (ref 15–37)
BILIRUB DIRECT SERPL-MCNC: 0.3 MG/DL (ref 0–0.4)
BILIRUB SERPL-MCNC: 0.9 MG/DL (ref 0–1.2)
BUN SERPL-MCNC: 34 MG/DL (ref 8–23)
CALCIUM SERPL-MCNC: 8.7 MG/DL (ref 8.8–10.2)
CHLORIDE SERPL-SCNC: 90 MMOL/L (ref 98–107)
CO2 SERPL-SCNC: 32 MMOL/L (ref 20–28)
CREAT SERPL-MCNC: 1.15 MG/DL (ref 0.6–1.1)
ERYTHROCYTE [DISTWIDTH] IN BLOOD BY AUTOMATED COUNT: 13.8 % (ref 11.9–14.6)
GLOBULIN SER CALC-MCNC: 3.1 G/DL (ref 2.3–3.5)
GLUCOSE BLD STRIP.AUTO-MCNC: 140 MG/DL (ref 65–100)
GLUCOSE BLD STRIP.AUTO-MCNC: 147 MG/DL (ref 65–100)
GLUCOSE BLD STRIP.AUTO-MCNC: 156 MG/DL (ref 65–100)
GLUCOSE BLD STRIP.AUTO-MCNC: 182 MG/DL (ref 65–100)
GLUCOSE SERPL-MCNC: 151 MG/DL (ref 70–99)
HCT VFR BLD AUTO: 39.5 % (ref 35.8–46.3)
HGB BLD-MCNC: 13.1 G/DL (ref 11.7–15.4)
LDH SERPL L TO P-CCNC: 355 U/L (ref 127–281)
MAGNESIUM SERPL-MCNC: 2 MG/DL (ref 1.8–2.4)
MCH RBC QN AUTO: 32.5 PG (ref 26.1–32.9)
MCHC RBC AUTO-ENTMCNC: 33.2 G/DL (ref 31.4–35)
MCV RBC AUTO: 98 FL (ref 82–102)
NRBC # BLD: 0 K/UL (ref 0–0.2)
NT PRO BNP: 4953 PG/ML (ref 0–450)
PLATELET # BLD AUTO: 331 K/UL (ref 150–450)
PMV BLD AUTO: 10 FL (ref 9.4–12.3)
POTASSIUM SERPL-SCNC: 3.9 MMOL/L (ref 3.5–5.1)
PROT SERPL-MCNC: 5.5 G/DL (ref 6.3–8.2)
RBC # BLD AUTO: 4.03 M/UL (ref 4.05–5.2)
SERVICE CMNT-IMP: ABNORMAL
SODIUM SERPL-SCNC: 135 MMOL/L (ref 136–145)
WBC # BLD AUTO: 10.3 K/UL (ref 4.3–11.1)

## 2024-07-03 PROCEDURE — 6370000000 HC RX 637 (ALT 250 FOR IP): Performed by: INTERNAL MEDICINE

## 2024-07-03 PROCEDURE — 83880 ASSAY OF NATRIURETIC PEPTIDE: CPT

## 2024-07-03 PROCEDURE — 80048 BASIC METABOLIC PNL TOTAL CA: CPT

## 2024-07-03 PROCEDURE — 83615 LACTATE (LD) (LDH) ENZYME: CPT

## 2024-07-03 PROCEDURE — 99497 ADVNCD CARE PLAN 30 MIN: CPT | Performed by: INTERNAL MEDICINE

## 2024-07-03 PROCEDURE — 85027 COMPLETE CBC AUTOMATED: CPT

## 2024-07-03 PROCEDURE — 94761 N-INVAS EAR/PLS OXIMETRY MLT: CPT

## 2024-07-03 PROCEDURE — 99222 1ST HOSP IP/OBS MODERATE 55: CPT | Performed by: INTERNAL MEDICINE

## 2024-07-03 PROCEDURE — 71045 X-RAY EXAM CHEST 1 VIEW: CPT

## 2024-07-03 PROCEDURE — 6360000002 HC RX W HCPCS: Performed by: FAMILY MEDICINE

## 2024-07-03 PROCEDURE — 99233 SBSQ HOSP IP/OBS HIGH 50: CPT | Performed by: INTERNAL MEDICINE

## 2024-07-03 PROCEDURE — 2500000003 HC RX 250 WO HCPCS: Performed by: STUDENT IN AN ORGANIZED HEALTH CARE EDUCATION/TRAINING PROGRAM

## 2024-07-03 PROCEDURE — 6370000000 HC RX 637 (ALT 250 FOR IP): Performed by: STUDENT IN AN ORGANIZED HEALTH CARE EDUCATION/TRAINING PROGRAM

## 2024-07-03 PROCEDURE — 2580000003 HC RX 258: Performed by: FAMILY MEDICINE

## 2024-07-03 PROCEDURE — 6360000002 HC RX W HCPCS

## 2024-07-03 PROCEDURE — 83735 ASSAY OF MAGNESIUM: CPT

## 2024-07-03 PROCEDURE — 2000000000 HC ICU R&B

## 2024-07-03 PROCEDURE — 94660 CPAP INITIATION&MGMT: CPT

## 2024-07-03 PROCEDURE — 2500000003 HC RX 250 WO HCPCS: Performed by: INTERNAL MEDICINE

## 2024-07-03 PROCEDURE — 2400000000

## 2024-07-03 PROCEDURE — 80076 HEPATIC FUNCTION PANEL: CPT

## 2024-07-03 PROCEDURE — 82962 GLUCOSE BLOOD TEST: CPT

## 2024-07-03 PROCEDURE — 2580000003 HC RX 258

## 2024-07-03 PROCEDURE — 6360000002 HC RX W HCPCS: Performed by: INTERNAL MEDICINE

## 2024-07-03 PROCEDURE — 36415 COLL VENOUS BLD VENIPUNCTURE: CPT

## 2024-07-03 RX ORDER — MORPHINE SULFATE 2 MG/ML
1 INJECTION, SOLUTION INTRAMUSCULAR; INTRAVENOUS EVERY 4 HOURS PRN
Status: DISCONTINUED | OUTPATIENT
Start: 2024-07-03 | End: 2024-07-09 | Stop reason: HOSPADM

## 2024-07-03 RX ORDER — FUROSEMIDE 10 MG/ML
40 INJECTION INTRAMUSCULAR; INTRAVENOUS DAILY
Status: DISCONTINUED | OUTPATIENT
Start: 2024-07-03 | End: 2024-07-04

## 2024-07-03 RX ADMIN — AMIODARONE HYDROCHLORIDE 1 MG/MIN: 50 INJECTION, SOLUTION INTRAVENOUS at 16:22

## 2024-07-03 RX ADMIN — DILTIAZEM HYDROCHLORIDE 60 MG: 30 TABLET, FILM COATED ORAL at 12:39

## 2024-07-03 RX ADMIN — DOCUSATE SODIUM 50 MG AND SENNOSIDES 8.6 MG 2 TABLET: 8.6; 5 TABLET, FILM COATED ORAL at 21:22

## 2024-07-03 RX ADMIN — LEVOTHYROXINE SODIUM 88 MCG: 0.09 TABLET ORAL at 08:14

## 2024-07-03 RX ADMIN — APIXABAN 2.5 MG: 2.5 TABLET, FILM COATED ORAL at 08:32

## 2024-07-03 RX ADMIN — AMIODARONE HYDROCHLORIDE 1 MG/MIN: 50 INJECTION, SOLUTION INTRAVENOUS at 23:16

## 2024-07-03 RX ADMIN — SPIRONOLACTONE 25 MG: 25 TABLET ORAL at 08:32

## 2024-07-03 RX ADMIN — EMPAGLIFLOZIN 10 MG: 10 TABLET, FILM COATED ORAL at 08:31

## 2024-07-03 RX ADMIN — AMIODARONE HYDROCHLORIDE 1 MG/MIN: 50 INJECTION, SOLUTION INTRAVENOUS at 00:17

## 2024-07-03 RX ADMIN — SODIUM CHLORIDE, PRESERVATIVE FREE 40 MG: 5 INJECTION INTRAVENOUS at 08:14

## 2024-07-03 RX ADMIN — MORPHINE SULFATE 1 MG: 2 INJECTION, SOLUTION INTRAMUSCULAR; INTRAVENOUS at 14:04

## 2024-07-03 RX ADMIN — DOCUSATE SODIUM 50 MG AND SENNOSIDES 8.6 MG 2 TABLET: 8.6; 5 TABLET, FILM COATED ORAL at 08:32

## 2024-07-03 RX ADMIN — SODIUM CHLORIDE, PRESERVATIVE FREE 40 MG: 5 INJECTION INTRAVENOUS at 16:34

## 2024-07-03 RX ADMIN — AMIODARONE HYDROCHLORIDE 1 MG/MIN: 50 INJECTION, SOLUTION INTRAVENOUS at 08:42

## 2024-07-03 RX ADMIN — DILTIAZEM HYDROCHLORIDE 60 MG: 30 TABLET, FILM COATED ORAL at 23:13

## 2024-07-03 RX ADMIN — METOPROLOL TARTRATE 5 MG: 5 INJECTION INTRAVENOUS at 10:35

## 2024-07-03 RX ADMIN — DILTIAZEM HYDROCHLORIDE 60 MG: 30 TABLET, FILM COATED ORAL at 18:02

## 2024-07-03 RX ADMIN — FUROSEMIDE 40 MG: 10 INJECTION, SOLUTION INTRAMUSCULAR; INTRAVENOUS at 10:35

## 2024-07-03 RX ADMIN — ESCITALOPRAM OXALATE 20 MG: 10 TABLET ORAL at 08:32

## 2024-07-03 RX ADMIN — METOPROLOL SUCCINATE 100 MG: 100 TABLET, EXTENDED RELEASE ORAL at 23:13

## 2024-07-03 RX ADMIN — APIXABAN 5 MG: 5 TABLET, FILM COATED ORAL at 21:22

## 2024-07-03 RX ADMIN — METOPROLOL SUCCINATE 100 MG: 100 TABLET, EXTENDED RELEASE ORAL at 09:42

## 2024-07-03 ASSESSMENT — PAIN SCALES - GENERAL
PAINLEVEL_OUTOF10: 0

## 2024-07-03 NOTE — PLAN OF CARE
Problem: Discharge Planning  Goal: Discharge to home or other facility with appropriate resources  Outcome: Progressing  Flowsheets (Taken 7/3/2024 0701)  Discharge to home or other facility with appropriate resources:   Identify barriers to discharge with patient and caregiver   Arrange for needed discharge resources and transportation as appropriate   Identify discharge learning needs (meds, wound care, etc)   Arrange for interpreters to assist at discharge as needed   Refer to discharge planning if patient needs post-hospital services based on physician order or complex needs related to functional status, cognitive ability or social support system     Problem: Skin/Tissue Integrity  Goal: Absence of new skin breakdown  Description: 1.  Monitor for areas of redness and/or skin breakdown  2.  Assess vascular access sites hourly  3.  Every 4-6 hours minimum:  Change oxygen saturation probe site  4.  Every 4-6 hours:  If on nasal continuous positive airway pressure, respiratory therapy assess nares and determine need for appliance change or resting period.  Outcome: Progressing     Problem: Safety - Adult  Goal: Free from fall injury  Outcome: Progressing     Problem: ABCDS Injury Assessment  Goal: Absence of physical injury  Outcome: Progressing     Problem: Pain  Goal: Verbalizes/displays adequate comfort level or baseline comfort level  Outcome: Progressing  Flowsheets (Taken 7/3/2024 0701)  Verbalizes/displays adequate comfort level or baseline comfort level:   Assess pain using appropriate pain scale   Administer analgesics based on type and severity of pain and evaluate response

## 2024-07-03 NOTE — CARE COORDINATION
Chart reviewed and pt discussed in am IDR. Continues ICU s/p tx from tele afib RVR. BIPAP -70% fio2 and amio gtt presently. Poss Palliative care cx per rounds. D/C POC was STR at [facility] and ins approved, but may need new precert when stable if able to wean BIPAP. CM following for SPENCER needs/POC and any assist. LOS 16 days.

## 2024-07-03 NOTE — INTERDISCIPLINARY ROUNDS
Multi-D Rounds/Checklist (leapfrog):  Lines: can any be removed?: None    Urinary Catheter 06/22/24 (Active)      DVT Prophylaxis: Ordered   Vent: N/A  Nutrition Ordered/appropriate: Ordered  Can antibiotics or other drugs be stopped? N/A     Inpat Anti-Infectives (From admission, onward)      None          Consults needed: None  A: Is pain control adequate? (has PRNs? Stop drip?) Yes  B: Sedation break and SBT? N/A  C: Is sedation choice appropriate? N/A  D: Delirium/CAM-ICU? No  E: Mobility goals/appropriateness? Yes  F: Family update and plan? Daughter is surrogate decision maker and is being updated daily by primary attending and nursing staff.    Selene Moreau, APRN - NP

## 2024-07-03 NOTE — CONSULTS
In this split/shared evaluation I performed reviewed the patients's H&P, available images, labs, cultures., discussed case in detail with ACP, performed a medically appropriate history and exam, counseled and educated the patient and/or family member, ordered and/or reviewed medications, tests or procedures, documented information in EMR, independently interpreted images and coordinated care.     Personal Time: 45 minutes -this equates to greater than 50% of total time in patient consultation/care.        Patient seen and examined by me.  Agree with above note by physician extender.  Key findings are: 89-year-old female with history of atrial fibrillation on chronic Eliquis therapy but has discontinued medication recently due to feeling poorly, chronic hypertension and dyslipidemia who presents to the emergency after a fall at home.  It is unclear whether patient fell or lost consciousness.  Upon presentation she was noted to be severely tachycardic with rates greater than 200 bpm.  She was given IV adenosine with no improvement in 45 mg of IV diltiazem with development of A-fib with RVR.  On arrival here she remained tachycardic and tachypneic.  EKG confirmed A-fib with RVR.  CT of the chest demonstrates pulmonary emboli.  Patient also has elevated LFTs of uncertain etiology.  Current review of echocardiogram demonstrates biventricular heart failure uncertain whether this is true biventricular heart failure or whether RV dysfunction is related to pulmonary emboli.    Vitals:    06/18/24 1501 06/18/24 1537 06/18/24 1538 06/18/24 1548   BP: 114/63  (!) 154/82 114/88   Pulse: 98  (!) 104 (!) 132   Resp: 27 25     Temp:       TempSrc:       SpO2: 95%  97% 97%   Weight:       Height:            General: Patient mildly disoriented and frail and weak in appearance  HEENT: Pupils equal reactive, oropharynx clear  Chest: Diminished bilaterally  Cardiovascular: Irregular and tachycardic  Abdomen: Soft positive bowel 
MD Adam   olmesartan (BENICAR) 40 MG tablet  4/20/23   Provider, MD Adam   levothyroxine (SYNTHROID) 88 MCG tablet  4/20/23   Provider, MD Adam   dicyclomine (BENTYL) 10 MG capsule  4/20/23   Provider, MD Adam   simvastatin (ZOCOR) 40 MG tablet  4/20/23   Provider, MD Adam   sulindac (CLINORIL) 150 MG tablet Take 0.5 tablets by mouth 4/20/23   Provider, MD Adam   ELIQUIS 2.5 MG TABS tablet  5/12/23   Provider, MD Adam   amLODIPine (NORVASC) 2.5 MG tablet  7/20/23   Provider, MD Adam   alendronate (FOSAMAX) 35 MG tablet  4/20/23   Provider, MD Adam   ascorbic acid (VITAMIN C) 500 MG tablet Take by mouth    Automatic Reconciliation, Ar   aspirin 81 MG EC tablet Take by mouth daily    Automatic Reconciliation, Ar   aspirin-acetaminophen-caffeine (EXCEDRIN MIGRAINE) 250-250-65 MG per tablet Take 1 tablet by mouth    Automatic Reconciliation, Ar   Biotin 2.5 MG CAPS Take by mouth Indications: HAIR SKIN NAILS    Automatic Reconciliation, Ar   ergocalciferol (ERGOCALCIFEROL) 1.25 MG (53090 UT) capsule Take 1 capsule by mouth    Automatic Reconciliation, Ar   potassium gluconate 550 mg tablet Take 99 mg by mouth daily    Automatic Reconciliation, Ar       Allergies   Allergen Reactions    Adhesive Tape Rash    Seconal [Secobarbital] Nausea And Vomiting        Comprehensive review of systems completed and negative with exception of noted above     Objective:     Visit Vitals  BP (!) 140/95   Pulse (!) 139   Temp 99.3 °F (37.4 °C) (Axillary)   Resp (!) 38   Ht 1.524 m (5')   Wt 63.7 kg (140 lb 6.9 oz)   SpO2 95%   BMI 27.43 kg/m²        Physical Exam:    General:  Cooperative. No acute distress.   Eyes:  Conjunctivae/corneas clear    Nose: Nares normal. Septum midline.   Neck: Supple, symmetrical, trachea midline, no JVD   Lungs:   Coarse bilateral bs   Heart:  Regular rate and rhythm, no murmur    Abdomen:   Soft, non-tender, non-distended. Positive bowel sounds 
Updated: 06/17/24 2144     Special Requests --        LEFT  ARM       Culture PENDING    Blood Culture 2 [[id number]] Collected: 06/17/24 2134    Order Status: Completed Specimen: Blood Updated: 06/17/24 2144     Special Requests --        RIGHT  ARM       Culture PENDING          No results for input(s): \"COVID19\" in the last 72 hours.  Ventilator Settings Ideal body weight: 45.5 kg (100 lb 4.9 oz)  Adjusted ideal body weight: 54.1 kg (119 lb 4.3 oz)  Mode FIO2 Rate Tidal Volume Pressure                           Peak airway pressure:     Minute ventilation:    ABG:No results for input(s): \"PHAPOC\", \"CFD9OJPY\", \"HV7AUPA\", \"DMZ5UXU\", \"BE\" in the last 72 hours.  Assessment and Plan:  (Medical Decision Making)   Impression: 89 y.o. female with hypertension, hyperlipidemia, atrial fibrillation on Eliquis and dCHF now with bilat Pes, Afib with RVR and bilateral pleural effusions with worsening respiratory status.    NEURO:   Sedation: avoid  Analgesia: none  Paralytics: NA    CV:   Volume Status: Given NS 1500cc in ER, no hypotension. Has h/o dCHF, will be judicious with fluids  Septic shock: elevated lactate, normal white count. No PNA on chest CT, was given Zosyn in the ER but do not suspect infectious etiology  NSTEMI: elevated troponin most likely due to Afib with RVR    PULM:   Acute hypoxemic/hypercapneic respiratory failure:  multifactorial likely due to PEs and pleural effusions. Currently on NC 5L, will place on Airvo if needed (then to BIPAP)  Pulmonary emboli: on heparin drip, spoke with IR - not a candidate for thrhombectomy.  Pleural effusions: R>L, moderate size. Has not taken Eliquis since Thursday or Friday per pt    RENAL:  JOE: Cr 1.91, no old labs in EPIC  Lactic acidosis: mild, 4.4 >>3.3 after 1.5L IV fluid bolus  Electrolytes: Na 154, will monitor    GI:   Nutrition: per primary team    HEME:   Anemia: none  Thrombocytopenia: none  Anticoagulation: On Eliquis, has not taken for past 4-5 days    ID: 
maximal intraluminal diameter 2.5 cm. Automatic exposure control was used as a dose lowering technique. Radiation Dose: CTDI is 40.38 mGy. DLP is 872.83 mGy-cm. Contrast Type: iopamidol (ISOVUE-370) 76 . Contrast Volume: 100 mL Report signed on 06/17/2024 (23:04 Eastern Time) Signed by: Cleveland Delatorre M.D. Reading Location: [id number]    XR CHEST PORTABLE    Result Date: 6/17/2024  Widening of the mediastinum which may be due to a thoracic aortic aneurysm. This also may be due to limited portable technique. CTA of the chest can be obtained if clinically appropriate. Right basilar airspace disease. Electronically signed by [facility] Problems             Last Modified POA    * (Principal) Atrial fibrillation with rapid ventricular response (HCC) 6/18/2024 Yes    Pulmonary embolism (HCC) 6/18/2024 Yes    Elevated LFTs 6/18/2024 Yes    Paroxysmal atrial fibrillation (HCC) (Chronic) 6/18/2024 Yes    Hyperlipidemia 6/18/2024 Yes    Hypertension 6/18/2024 Yes    Hypernatremia 6/18/2024 Yes    Metabolic acidosis 6/18/2024 Yes    JOE (acute kidney injury) (HCC) 6/18/2024 Yes    AAA (abdominal aortic aneurysm) ([facility]) 6/18/2024 Yes    Bilateral pleural effusion 6/18/2024 Yes       Plan   1. Elevated TB and LFT: Patient, admitted for PE, AFIB RVR, pulmonary edema, now with elevated TB and LFT. No comparison labs. Patient without any abdominal pain, fevers, chills. No leukocytosis and pt afebrile. She does require Airvo currently and on heparin ggt for PE. Discussed case with MD; will fractionate bilirubin and monitor LFT. If worsening LFT, consider MRCP to assess bile ducts. Would be high risk for endoscopy given PE and respiratory function.           Physician Attestation:     The information above serves as a record of the service and decisions personally performed and made by me.     I have discussed the patient's management with the PA.  I have fully participated in the care of this patient and have 
FL    nRBC 0.07 0.0 - 0.2 K/uL   APTT    Collection Time: 06/18/24 12:27 AM   Result Value Ref Range    APTT 30.9 23.3 - 37.4 SEC   Protime-INR    Collection Time: 06/18/24 12:27 AM   Result Value Ref Range    Protime 25.0 (H) 11.3 - 14.9 sec    INR 2.1     APTT    Collection Time: 06/18/24  2:04 AM   Result Value Ref Range    APTT >200.0 (HH) 23.3 - 37.4 SEC   APTT    Collection Time: 06/18/24  6:29 AM   Result Value Ref Range    APTT >200.0 (HH) 23.3 - 37.4 SEC   Lactic Acid    Collection Time: 06/18/24  6:29 AM   Result Value Ref Range    Lactic Acid 2.2 (H) 0.5 - 2.0 mmol/L   Basic Metabolic Panel    Collection Time: 06/18/24  6:29 AM   Result Value Ref Range    Sodium 147 (H) 136 - 145 mmol/L    Potassium 4.0 3.5 - 5.1 mmol/L    Chloride 111 (H) 98 - 107 mmol/L    CO2 20 20 - 28 mmol/L    Anion Gap 16 9 - 18 mmol/L    Glucose 258 (H) 70 - 99 mg/dL    BUN 43 (H) 8 - 23 MG/DL    Creatinine 1.54 (H) 0.60 - 1.10 MG/DL    Est, Glom Filt Rate 32 (L) >60 ml/min/1.73m2    Calcium 8.3 (L) 8.8 - 10.2 MG/DL   Arterial Blood Gas, POC    Collection Time: 06/18/24  8:08 AM   Result Value Ref Range    DEVICE High Flow Nasal Cannula      FIO2 45 %    POC pH 7.39 7.35 - 7.45      POC pCO2 44.6 35 - 45 MMHG    POC PO2 89 75 - 100 MMHG    POC HCO3 26.7 (H) 22 - 26 MMOL/L    POC O2 SAT 96.6 95 - 98 %    Base Excess 1.2 mmol/L    POC Rancho's Test Positive      Site RIGHT RADIAL      Specimen type: ARTERIAL      Performed by: Bobbi        Imaging:    CT Result (most recent):  CTA CHEST ABDOMEN PELVIS W WO CONTRAST 06/17/2024    Narrative  Called for Critical Findings by Cristobal Aguiar to Dr. Giordano  on  06/17/2024  8:21PM Pacific Time;Call for Critical Findings;    CT ANGIOGRAPHY CHEST ABDOMEN AND PELVIS WITH INTRAVENOUS CONTRAST    INDICATION:  chest pain/syncope : chest pain/syncope    TECHNIQUE:  Axial computed tomographic angiography images of the  chest, abdomen and pelvis with intravenous contrast.  This CT exam

## 2024-07-04 PROBLEM — R50.9 FEVER: Status: ACTIVE | Noted: 2024-07-04

## 2024-07-04 PROBLEM — Z51.5 END OF LIFE CARE: Status: ACTIVE | Noted: 2024-07-03

## 2024-07-04 PROBLEM — J96.00 ACUTE RESPIRATORY FAILURE (HCC): Status: ACTIVE | Noted: 2024-06-21

## 2024-07-04 LAB
ANION GAP SERPL CALC-SCNC: 14 MMOL/L (ref 9–18)
BACTERIA SPEC CULT: NORMAL
BUN SERPL-MCNC: 42 MG/DL (ref 8–23)
CALCIUM SERPL-MCNC: 8.8 MG/DL (ref 8.8–10.2)
CHLORIDE SERPL-SCNC: 89 MMOL/L (ref 98–107)
CO2 SERPL-SCNC: 30 MMOL/L (ref 20–28)
CREAT SERPL-MCNC: 1.6 MG/DL (ref 0.6–1.1)
ERYTHROCYTE [DISTWIDTH] IN BLOOD BY AUTOMATED COUNT: 14.2 % (ref 11.9–14.6)
GLUCOSE SERPL-MCNC: 206 MG/DL (ref 70–99)
GRAM STN SPEC: NORMAL
GRAM STN SPEC: NORMAL
HCT VFR BLD AUTO: 36.7 % (ref 35.8–46.3)
HGB BLD-MCNC: 12.1 G/DL (ref 11.7–15.4)
MAGNESIUM SERPL-MCNC: 2.1 MG/DL (ref 1.8–2.4)
MCH RBC QN AUTO: 32.1 PG (ref 26.1–32.9)
MCHC RBC AUTO-ENTMCNC: 33 G/DL (ref 31.4–35)
MCV RBC AUTO: 97.3 FL (ref 82–102)
NRBC # BLD: 0 K/UL (ref 0–0.2)
PLATELET # BLD AUTO: 314 K/UL (ref 150–450)
PMV BLD AUTO: 10 FL (ref 9.4–12.3)
POTASSIUM SERPL-SCNC: 4.3 MMOL/L (ref 3.5–5.1)
RBC # BLD AUTO: 3.77 M/UL (ref 4.05–5.2)
SERVICE CMNT-IMP: NORMAL
SODIUM SERPL-SCNC: 133 MMOL/L (ref 136–145)
WBC # BLD AUTO: 8.9 K/UL (ref 4.3–11.1)

## 2024-07-04 PROCEDURE — 6370000000 HC RX 637 (ALT 250 FOR IP): Performed by: INTERNAL MEDICINE

## 2024-07-04 PROCEDURE — 94660 CPAP INITIATION&MGMT: CPT

## 2024-07-04 PROCEDURE — 6360000002 HC RX W HCPCS: Performed by: INTERNAL MEDICINE

## 2024-07-04 PROCEDURE — 2500000003 HC RX 250 WO HCPCS: Performed by: INTERNAL MEDICINE

## 2024-07-04 PROCEDURE — 83735 ASSAY OF MAGNESIUM: CPT

## 2024-07-04 PROCEDURE — 99291 CRITICAL CARE FIRST HOUR: CPT | Performed by: INTERNAL MEDICINE

## 2024-07-04 PROCEDURE — 2580000003 HC RX 258

## 2024-07-04 PROCEDURE — 94760 N-INVAS EAR/PLS OXIMETRY 1: CPT

## 2024-07-04 PROCEDURE — 85027 COMPLETE CBC AUTOMATED: CPT

## 2024-07-04 PROCEDURE — 99232 SBSQ HOSP IP/OBS MODERATE 35: CPT | Performed by: INTERNAL MEDICINE

## 2024-07-04 PROCEDURE — 36415 COLL VENOUS BLD VENIPUNCTURE: CPT

## 2024-07-04 PROCEDURE — 1100000000 HC RM PRIVATE

## 2024-07-04 PROCEDURE — 80048 BASIC METABOLIC PNL TOTAL CA: CPT

## 2024-07-04 PROCEDURE — 6360000002 HC RX W HCPCS

## 2024-07-04 PROCEDURE — 2700000000 HC OXYGEN THERAPY PER DAY

## 2024-07-04 RX ORDER — MORPHINE SULFATE 20 MG/ML
5 SOLUTION ORAL
Status: DISCONTINUED | OUTPATIENT
Start: 2024-07-04 | End: 2024-07-09 | Stop reason: HOSPADM

## 2024-07-04 RX ORDER — ACETAMINOPHEN 325 MG/1
650 TABLET ORAL EVERY 4 HOURS PRN
Status: DISCONTINUED | OUTPATIENT
Start: 2024-07-04 | End: 2024-07-09 | Stop reason: HOSPADM

## 2024-07-04 RX ORDER — GLYCOPYRROLATE 0.2 MG/ML
0.2 INJECTION INTRAMUSCULAR; INTRAVENOUS EVERY 4 HOURS PRN
Status: DISCONTINUED | OUTPATIENT
Start: 2024-07-04 | End: 2024-07-09 | Stop reason: HOSPADM

## 2024-07-04 RX ORDER — BISACODYL 10 MG
10 SUPPOSITORY, RECTAL RECTAL DAILY PRN
Status: DISCONTINUED | OUTPATIENT
Start: 2024-07-04 | End: 2024-07-09 | Stop reason: HOSPADM

## 2024-07-04 RX ORDER — INSULIN GLARGINE 100 [IU]/ML
5 INJECTION, SOLUTION SUBCUTANEOUS NIGHTLY
Status: DISCONTINUED | OUTPATIENT
Start: 2024-07-04 | End: 2024-07-04

## 2024-07-04 RX ORDER — ONDANSETRON 2 MG/ML
4 INJECTION INTRAMUSCULAR; INTRAVENOUS EVERY 6 HOURS PRN
Status: DISCONTINUED | OUTPATIENT
Start: 2024-07-04 | End: 2024-07-09 | Stop reason: HOSPADM

## 2024-07-04 RX ORDER — POLYETHYLENE GLYCOL 3350 17 G/17G
17 POWDER, FOR SOLUTION ORAL DAILY PRN
Status: DISCONTINUED | OUTPATIENT
Start: 2024-07-04 | End: 2024-07-09 | Stop reason: HOSPADM

## 2024-07-04 RX ORDER — MORPHINE SULFATE 2 MG/ML
2 INJECTION, SOLUTION INTRAMUSCULAR; INTRAVENOUS
Status: DISCONTINUED | OUTPATIENT
Start: 2024-07-04 | End: 2024-07-09 | Stop reason: HOSPADM

## 2024-07-04 RX ORDER — LOPERAMIDE HYDROCHLORIDE 2 MG/1
2 CAPSULE ORAL PRN
Status: DISCONTINUED | OUTPATIENT
Start: 2024-07-04 | End: 2024-07-09 | Stop reason: HOSPADM

## 2024-07-04 RX ORDER — ACETAMINOPHEN 650 MG/1
650 SUPPOSITORY RECTAL EVERY 4 HOURS PRN
Status: DISCONTINUED | OUTPATIENT
Start: 2024-07-04 | End: 2024-07-09 | Stop reason: HOSPADM

## 2024-07-04 RX ORDER — MORPHINE SULFATE 4 MG/ML
4 INJECTION, SOLUTION INTRAMUSCULAR; INTRAVENOUS
Status: DISCONTINUED | OUTPATIENT
Start: 2024-07-04 | End: 2024-07-09 | Stop reason: HOSPADM

## 2024-07-04 RX ADMIN — MORPHINE SULFATE 2 MG: 2 INJECTION, SOLUTION INTRAMUSCULAR; INTRAVENOUS at 12:17

## 2024-07-04 RX ADMIN — AMIODARONE HYDROCHLORIDE 1 MG/MIN: 50 INJECTION, SOLUTION INTRAVENOUS at 07:12

## 2024-07-04 RX ADMIN — GLYCOPYRROLATE 0.2 MG: 0.2 INJECTION INTRAMUSCULAR; INTRAVENOUS at 20:06

## 2024-07-04 RX ADMIN — MORPHINE SULFATE 2 MG: 2 INJECTION, SOLUTION INTRAMUSCULAR; INTRAVENOUS at 20:00

## 2024-07-04 RX ADMIN — MORPHINE SULFATE 2 MG: 2 INJECTION, SOLUTION INTRAMUSCULAR; INTRAVENOUS at 15:32

## 2024-07-04 RX ADMIN — DILTIAZEM HYDROCHLORIDE 60 MG: 30 TABLET, FILM COATED ORAL at 05:01

## 2024-07-04 ASSESSMENT — PAIN SCALES - GENERAL
PAINLEVEL_OUTOF10: 0

## 2024-07-05 LAB
ACID FAST STN SPEC: NEGATIVE
FUNGAL CULT/SMEAR: NORMAL
MYCOBACTERIUM SPEC QL CULT: NORMAL
SPECIMEN PREPARATION: NORMAL
SPECIMEN PROCESSING: NORMAL
SPECIMEN SOURCE: NORMAL
SPECIMEN SOURCE: NORMAL

## 2024-07-05 PROCEDURE — 2500000003 HC RX 250 WO HCPCS: Performed by: INTERNAL MEDICINE

## 2024-07-05 PROCEDURE — 6360000002 HC RX W HCPCS: Performed by: INTERNAL MEDICINE

## 2024-07-05 PROCEDURE — 2580000003 HC RX 258: Performed by: INTERNAL MEDICINE

## 2024-07-05 PROCEDURE — 1100000000 HC RM PRIVATE

## 2024-07-05 RX ADMIN — MORPHINE SULFATE 2 MG: 2 INJECTION, SOLUTION INTRAMUSCULAR; INTRAVENOUS at 04:50

## 2024-07-05 RX ADMIN — MORPHINE SULFATE 2 MG: 2 INJECTION, SOLUTION INTRAMUSCULAR; INTRAVENOUS at 08:37

## 2024-07-05 RX ADMIN — SODIUM CHLORIDE 1 MG: 9 INJECTION INTRAMUSCULAR; INTRAVENOUS; SUBCUTANEOUS at 13:31

## 2024-07-05 RX ADMIN — GLYCOPYRROLATE 0.2 MG: 0.2 INJECTION INTRAMUSCULAR; INTRAVENOUS at 16:00

## 2024-07-06 PROCEDURE — 6360000002 HC RX W HCPCS: Performed by: INTERNAL MEDICINE

## 2024-07-06 PROCEDURE — 1100000000 HC RM PRIVATE

## 2024-07-06 PROCEDURE — 2500000003 HC RX 250 WO HCPCS: Performed by: INTERNAL MEDICINE

## 2024-07-06 PROCEDURE — 2580000003 HC RX 258: Performed by: INTERNAL MEDICINE

## 2024-07-06 RX ADMIN — GLYCOPYRROLATE 0.2 MG: 0.2 INJECTION INTRAMUSCULAR; INTRAVENOUS at 22:55

## 2024-07-06 RX ADMIN — MORPHINE SULFATE 2 MG: 2 INJECTION, SOLUTION INTRAMUSCULAR; INTRAVENOUS at 22:55

## 2024-07-06 RX ADMIN — GLYCOPYRROLATE 0.2 MG: 0.2 INJECTION INTRAMUSCULAR; INTRAVENOUS at 01:04

## 2024-07-06 RX ADMIN — GLYCOPYRROLATE 0.2 MG: 0.2 INJECTION INTRAMUSCULAR; INTRAVENOUS at 10:14

## 2024-07-06 RX ADMIN — SODIUM CHLORIDE 1 MG: 9 INJECTION INTRAMUSCULAR; INTRAVENOUS; SUBCUTANEOUS at 14:25

## 2024-07-06 RX ADMIN — MORPHINE SULFATE 2 MG: 2 INJECTION, SOLUTION INTRAMUSCULAR; INTRAVENOUS at 01:03

## 2024-07-06 RX ADMIN — MORPHINE SULFATE 2 MG: 2 INJECTION, SOLUTION INTRAMUSCULAR; INTRAVENOUS at 06:13

## 2024-07-06 RX ADMIN — MORPHINE SULFATE 2 MG: 2 INJECTION, SOLUTION INTRAMUSCULAR; INTRAVENOUS at 17:22

## 2024-07-07 PROCEDURE — 6360000002 HC RX W HCPCS: Performed by: INTERNAL MEDICINE

## 2024-07-07 PROCEDURE — 2580000003 HC RX 258: Performed by: INTERNAL MEDICINE

## 2024-07-07 PROCEDURE — 1100000000 HC RM PRIVATE

## 2024-07-07 PROCEDURE — 2500000003 HC RX 250 WO HCPCS: Performed by: INTERNAL MEDICINE

## 2024-07-07 RX ADMIN — MORPHINE SULFATE 2 MG: 2 INJECTION, SOLUTION INTRAMUSCULAR; INTRAVENOUS at 14:37

## 2024-07-07 RX ADMIN — GLYCOPYRROLATE 0.2 MG: 0.2 INJECTION INTRAMUSCULAR; INTRAVENOUS at 15:57

## 2024-07-07 RX ADMIN — SODIUM CHLORIDE 1 MG: 9 INJECTION INTRAMUSCULAR; INTRAVENOUS; SUBCUTANEOUS at 05:30

## 2024-07-07 RX ADMIN — MORPHINE SULFATE 2 MG: 2 INJECTION, SOLUTION INTRAMUSCULAR; INTRAVENOUS at 05:30

## 2024-07-07 NOTE — CARE COORDINATION
1556:  LMSW follow up with pt's family at bedside after the on call hospice nurse saw pt/family this afternoon.  Family are concerned that pt appropriate to be transported to another facility.  Discussed that Hospice will continue to follow pt and if appropriate will assess pt tomorrow for possible transition to the hospice house.  Family for now prefer that pt remain at the hospital for end of life care if possible.    Am note: Referral has been sent to [facility] to assess pt for in pt hospice care.  Await review.

## 2024-07-08 VITALS
SYSTOLIC BLOOD PRESSURE: 92 MMHG | RESPIRATION RATE: 22 BRPM | WEIGHT: 140.43 LBS | OXYGEN SATURATION: 89 % | DIASTOLIC BLOOD PRESSURE: 69 MMHG | BODY MASS INDEX: 27.57 KG/M2 | HEART RATE: 129 BPM | HEIGHT: 60 IN | TEMPERATURE: 97.6 F

## 2024-07-08 PROCEDURE — 2500000003 HC RX 250 WO HCPCS: Performed by: STUDENT IN AN ORGANIZED HEALTH CARE EDUCATION/TRAINING PROGRAM

## 2024-07-08 PROCEDURE — 1100000000 HC RM PRIVATE

## 2024-07-08 PROCEDURE — 2580000003 HC RX 258: Performed by: INTERNAL MEDICINE

## 2024-07-08 PROCEDURE — 2500000003 HC RX 250 WO HCPCS: Performed by: INTERNAL MEDICINE

## 2024-07-08 PROCEDURE — 6360000002 HC RX W HCPCS: Performed by: INTERNAL MEDICINE

## 2024-07-08 RX ADMIN — MORPHINE SULFATE 2 MG: 2 INJECTION, SOLUTION INTRAMUSCULAR; INTRAVENOUS at 16:02

## 2024-07-08 RX ADMIN — GLYCOPYRROLATE 0.2 MG: 0.2 INJECTION INTRAMUSCULAR; INTRAVENOUS at 09:38

## 2024-07-08 RX ADMIN — MORPHINE SULFATE 2 MG: 2 INJECTION, SOLUTION INTRAMUSCULAR; INTRAVENOUS at 19:23

## 2024-07-08 RX ADMIN — SODIUM CHLORIDE 1 MG: 9 INJECTION INTRAMUSCULAR; INTRAVENOUS; SUBCUTANEOUS at 19:24

## 2024-07-08 RX ADMIN — MORPHINE SULFATE 2 MG: 2 INJECTION, SOLUTION INTRAMUSCULAR; INTRAVENOUS at 18:33

## 2024-07-08 RX ADMIN — MORPHINE SULFATE 1 MG: 2 INJECTION, SOLUTION INTRAMUSCULAR; INTRAVENOUS at 08:19

## 2024-07-08 NOTE — CARE COORDINATION
[facility] contacted about pt. Has home with hospice been discussed vs just [facility]?    Updated 1015: Discussed with liaison from [facility]. He will be in later to speak with family. As of yesterday family did not understand why Hospice was called and wanted the pt to just stay in the hospital.     Update 1140: Radha from [facility] into talk with this CM. He spoke with family and they are not interested in pt going anywhere but staying in the hospital.

## 2024-07-09 PROCEDURE — 2500000003 HC RX 250 WO HCPCS: Performed by: INTERNAL MEDICINE

## 2024-07-09 RX ADMIN — MORPHINE SULFATE 2 MG: 2 INJECTION, SOLUTION INTRAMUSCULAR; INTRAVENOUS at 01:09

## 2024-07-09 NOTE — PROGRESS NOTES
[facility] CARDIOLOGY PROGRESS NOTE           7/4/2024 4:50 PM    Admit Date: 6/17/2024      Subjective:   The patient is hypothermic and and had a fever yesterday.  She is incapable of providing a reliable medical interview at this time.      ROS:  No obvious pertinent positives on review of systems except for what was outlined above.    Objective:      Vitals:    07/04/24 1500 07/04/24 1515 07/04/24 1530 07/04/24 1608   BP: 101/61   99/65   Pulse: (!) 108 (!) 104 (!) 103 (!) 112   Resp: 22 21 21 14   Temp:    (!) 96.1 °F (35.6 °C)   TempSrc:    Axillary   SpO2: (!) 81% (!) 80% (!) 78% (!) 71%   Weight:       Height:           Physical Exam:  General-No Acute Distress  Neck- supple, no JVD  CV- IRIR no RG  Lung- clear bilaterally  Abd- soft, nontender, nondistended  Ext- no edema bilaterally.  Skin- warm and dry    Data Review:   Recent Labs     07/03/24  0518 07/04/24  0349   * 133*   K 3.9 4.3   MG 2.0 2.1   BUN 34* 42*   WBC 10.3 8.9   HGB 13.1 12.1   HCT 39.5 36.7    314       No results found for: \"CHOL\"  No results found for: \"TRIG\"  No results found for: \"HDL\"  No components found for: \"LDLCHOLESTEROL\", \"LDLCALC\"  No results found for: \"VLDL\"  No results found for: \"CHOLHDLRATIO\"     Lab Results   Component Value Date/Time     07/04/2024 03:49 AM     07/03/2024 05:18 AM     07/02/2024 05:48 AM    K 4.3 07/04/2024 03:49 AM    K 3.9 07/03/2024 05:18 AM    K 3.8 07/02/2024 05:48 AM    CL 89 07/04/2024 03:49 AM    CL 90 07/03/2024 05:18 AM    CL 93 07/02/2024 05:48 AM    CO2 30 07/04/2024 03:49 AM    CO2 32 07/03/2024 05:18 AM    CO2 32 07/02/2024 05:48 AM    BUN 42 07/04/2024 03:49 AM    BUN 34 07/03/2024 05:18 AM    BUN 34 07/02/2024 05:48 AM    CREATININE 1.60 07/04/2024 03:49 AM    CREATININE 1.15 07/03/2024 05:18 AM    CREATININE 1.00 07/02/2024 05:48 AM    GLUCOSE 206 07/04/2024 03:49 AM    GLUCOSE 151 07/03/2024 05:18 AM    GLUCOSE 150 07/02/2024 05:48 
                         [facility] CARDIOLOGY PROGRESS NOTE           6/24/2024 7:54 AM    Admit Date: 6/17/2024      Subjective:   Patient is lying flat in room.  Remains in A-fib with RVR.    ROS:  Cardiovascular:  As noted above    Objective:      Vitals:    06/24/24 0022 06/24/24 0327 06/24/24 0421 06/24/24 0716   BP: 134/89 (!) 138/95 (!) 119/90 132/74   Pulse: 79 (!) 117 71 86   Resp: 16  16 17   Temp: 97.5 °F (36.4 °C)  97.5 °F (36.4 °C) 97.5 °F (36.4 °C)   TempSrc: Oral  Axillary Temporal   SpO2: 99%  100% 99%   Weight:       Height:           Physical Exam:  General-No Acute Distress  Neck- supple, no JVD  CV-irregular and tachycardic  Lung- clear bilaterally  Abd- soft, nontender, nondistended  Ext- no edema bilaterally.  Skin- warm and dry    Data Review:   Recent Labs     06/23/24  0338 06/23/24  1406 06/23/24  1757 06/24/24  0006 06/24/24  0553   *  --    < > 143 143   K 3.3* 4.1  --   --  4.7   MG 1.9  --   --   --  2.4   BUN 26*  --   --   --  24*   WBC 8.2  --   --   --  8.7   HGB 13.4  --   --   --  13.8   HCT 43.4  --   --   --  45.1   *  --   --   --  140*    < > = values in this interval not displayed.     Echo Findings    Left Ventricle Moderately reduced left ventricular systolic function with a visually estimated EF of 40 - 45%. Left ventricle is mildly dilated. Normal wall thickness. Septal flattening in diastole and systole consistent with right ventricular volume and pressure overload. Moderate global hypokinesis present. Indeterminate diastolic function.   Left Atrium Left atrium is severely dilated.   Right Ventricle Right ventricle is moderately dilated. Reduced systolic function.   Right Atrium Right atrium is severely dilated.   Aortic Valve Sclerosis of the aortic valve cusp. Trace regurgitation. No stenosis.   Mitral Valve Valve structure is normal. Mild regurgitation. No stenosis noted.   Tricuspid Valve Valve structure is normal. Moderate to severe regurgitation. No 
                         [facility] CARDIOLOGY PROGRESS NOTE           6/25/2024 8:04 AM    Admit Date: 6/17/2024      Subjective:   Patient is lying flat in room.  Remains in A-fib with RVR.    ROS:  Cardiovascular:  As noted above    Objective:      Vitals:    06/25/24 0003 06/25/24 0257 06/25/24 0446 06/25/24 0736   BP: 129/75 131/82 114/85    Pulse: 81 (!) 131 (!) 130 (!) 111   Resp: 16 18 18   Temp: 97.5 °F (36.4 °C)  97.5 °F (36.4 °C)    TempSrc: Oral  Oral    SpO2: 95%  93% 96%   Weight:       Height:           Physical Exam:  General-No Acute Distress  Neck- supple, no JVD  CV-irregular and tachycardic  Lung- clear bilaterally  Abd- soft, nontender, nondistended  Ext- no edema bilaterally.  Skin- warm and dry    Data Review:   Recent Labs     06/24/24  0553 06/24/24  1234 06/24/24  1813 06/25/24  0000      < > 142 141   K 4.7  --   --  4.5   MG 2.4  --   --  1.9   BUN 24*  --   --  22   WBC 8.7  --   --  7.2   HGB 13.8  --   --  13.4   HCT 45.1  --   --  41.7   *  --   --  161    < > = values in this interval not displayed.       Echo Findings    Left Ventricle Moderately reduced left ventricular systolic function with a visually estimated EF of 40 - 45%. Left ventricle is mildly dilated. Normal wall thickness. Septal flattening in diastole and systole consistent with right ventricular volume and pressure overload. Moderate global hypokinesis present. Indeterminate diastolic function.   Left Atrium Left atrium is severely dilated.   Right Ventricle Right ventricle is moderately dilated. Reduced systolic function.   Right Atrium Right atrium is severely dilated.   Aortic Valve Sclerosis of the aortic valve cusp. Trace regurgitation. No stenosis.   Mitral Valve Valve structure is normal. Mild regurgitation. No stenosis noted.   Tricuspid Valve Valve structure is normal. Moderate to severe regurgitation. No stenosis noted. The estimated RVSP is 38 mmHg.   Pulmonic Valve Valve structure is normal. 
                         [facility] CARDIOLOGY PROGRESS NOTE           6/29/2024 8:15 AM    Admit Date: 6/17/2024      Subjective:   Nurses report worsening hypoxia this am.She admits to chronic SOB.    ROS:  GEN:  No fever or chills  Cardiovascular:  As noted above:no CP or palpitations.  Pulmonary:  As noted above  Neuro:  No new focal motor or sensory loss    Objective:      Vitals:    06/29/24 0414 06/29/24 0535 06/29/24 0726 06/29/24 0749   BP: 101/75 107/85     Pulse: 89 (!) 103  93   Resp: 18   22   Temp: 97.5 °F (36.4 °C)      TempSrc: Axillary      SpO2: 92%  91% 95%   Weight: 63.7 kg (140 lb 6.9 oz)      Height:           Physical Exam:  General-Frail elderly female with SOB  Neck- supple, no JVD  CV- irregular rate and rhythm no MRG  Lung- decreased BS bilaterally  Abd- soft, nontender, nondistended  Ext- no edema bilaterally.  Skin- warm and dry  Psychiatric:  Normal mood and affect.  Neurologic:  Alert and oriented X 3      Data Review:   Recent Labs     06/28/24  0507 06/29/24  0328    135*   K 4.6 5.1   MG 2.1 2.1   BUN 30* 30*   WBC 9.5 10.4   HGB 12.5 12.5   HCT 38.1 38.3    237       TELEMETRY:  AF with HR in the 80's.    Assessment/Plan:     Principal Problem:    Atrial fibrillation with rapid ventricular response (HCC):AF persists.Rate is controlled with Cardizem & Toprol.  Active Problems:    Pulmonary embolism (HCC):Eliquis OAC.    GOULD (nonalcoholic steatohepatitis)    Hypertension:Stable.Continue Aldactone,Cardizem,Toprol,and Lasix.    Bilateral pleural effusion:Status unknown.Repeat CXR this am.IV Lasix.    Acute respiratory failure with hypoxia (HCC):Worsening hypoxia this am.Repeat CXR.Switch po to iv Lasix.    Atrial thrombosis:Stable.Continue Eliquis.              HODAN PATE MD  6/29/2024 8:15 AM   
                         [facility] CARDIOLOGY PROGRESS NOTE           6/30/2024 10:28 AM    Admit Date: 6/17/2024      Subjective:   Reports weakness.    ROS:  GEN:  No fever or chills  Cardiovascular:  As noted above:no CP or palpitations.  Pulmonary:  As noted above:Chronic SOB.  Neuro:  No new focal motor or sensory loss    Objective:      Vitals:    06/30/24 0808 06/30/24 0834 06/30/24 0930 06/30/24 1002   BP: 100/84 105/79 106/81 116/86   Pulse: 99      Resp: 18      Temp: 97.5 °F (36.4 °C)      TempSrc: Oral      SpO2: 95%      Weight:       Height:           Physical Exam:  General-Frail elderly female  Neck- supple, no JVD  CV- irregular rate and rhythm no MRG  Lung- decreased lower lung field bs bilaterally  Abd- soft, nontender, nondistended  Ext- 1+ edema bilaterally.  Skin- warm and dry  Psychiatric:  Normal mood and affect.  Neurologic:  Alert and oriented X 3      Data Review:   Recent Labs     06/28/24  0507 06/29/24  0328 06/29/24  1050    135*  --    K 4.6 5.1  --    MG 2.1 2.1  --    BUN 30* 30*  --    WBC 9.5 10.4 8.7   HGB 12.5 12.5 12.2   HCT 38.1 38.3 38.2    237 223   INR  --   --  2.2       TELEMETRY:  AF with RVR    Assessment/Plan:     Principal Problem:    Atrial fibrillation with rapid ventricular response (HCC):HR elevated this am .Cardizem drip restarted.Continue Heparin.  Active Problems:    Pulmonary embolism (HCC):Continue Heparin.    Bilateral pleural effusion:Anticipating Thoracentesis in future per Pulmonary Medicine.        Atrial thrombosis:Continue Heparin.              HODAN PATE MD  6/30/2024 10:28 AM   
                       [facility] CARDIOLOGY PROGRESS NOTE           6/27/2024 7:42 AM    Admit Date: 6/17/2024    Admit Diagnosis: Lactic acidosis [E87.20]  Near syncope [R55]  Atrial fibrillation with rapid ventricular response (HCC) [I48.91]  Multiple subsegmental pulmonary emboli without acute cor pulmonale (HCC) [I26.94]    Assessment:   Principal Problem:    Atrial fibrillation with rapid ventricular response (HCC)  Active Problems:    Pulmonary embolism (HCC)    GOULD (nonalcoholic steatohepatitis)    Paroxysmal atrial fibrillation (HCC)    Myocardiopathy (HCC)    Hyperlipidemia    Grade II diastolic dysfunction    Hypertension    AAA (abdominal aortic aneurysm) (HCC)    Bilateral pleural effusion    Acute respiratory failure with hypoxia (HCC)    Atrial thrombosis  Resolved Problems:    Hypernatremia    Lactic acidosis    JOE (acute kidney injury) (HCC)    Hypoxia    Elevated bilirubin    Lower extremity edema      Plan:   AF with RVR - finding of large TRACE thrombus on MAGUI 6/25. Rate control best option for AF treatment currently, continue metoprolol and diltiazem. Rate control has been stable over the last 24 hours on oral medicines only by RACE-I/II criteria. Consider pace/ablate as other medical issues improve, but she remains too ill. Stop amiodarone. Continue Eliquis.   Large TRACE thrombus - continue Eliquis.   PE - continue Eliquis.   HFrEF - EF 40-45%, continue GDMT as tolerated.  CBD stones - complicates care, s/p ERCP, per GI. Following liver labs.   Pulmonary effusion - continue diuresis.   Hypoxia - multifactorial, continue treatments of above.   Dispo - very complex pt with high risk for further decompensation. Prognosis is guarded.     Thank you for allowing me to participate in the electrophysiologic care of this most pleasant patient. Please feel free to contact me if there are any questions or concerns.    Ross Diop MD, MS  Clinical Cardiac Electrophysiology  [facility] 
                       [facility] CARDIOLOGY PROGRESS NOTE           6/28/2024 6:56 AM    Admit Date: 6/17/2024    Admit Diagnosis: Lactic acidosis [E87.20]  Near syncope [R55]  Atrial fibrillation with rapid ventricular response (HCC) [I48.91]  Multiple subsegmental pulmonary emboli without acute cor pulmonale (HCC) [I26.94]      Subjective:   No complaints this AM    Interval History: (History of pertinent interval events obtained from nursing staff)    ROS:  GEN:  No fever or chills  Cardiovascular:  As noted above  Pulmonary:  As noted above  Neuro:  No new focal motor or sensory loss      Objective:     Vitals:    06/27/24 2345 06/27/24 2346 06/28/24 0410 06/28/24 0549   BP: (!) 95/58 109/75 98/64 109/80   Pulse: (!) 123 (!) 116 55 99   Resp: 21  17    Temp: 98.2 °F (36.8 °C)  98.3 °F (36.8 °C)    TempSrc: Temporal  Oral    SpO2: 93%  90%    Weight:       Height:           Physical Exam:  General- NAD  Neck- supple, no JVD  CV- irreg irreg no MRG  Lung- clear bilaterally  Abd- soft, nontender, nondistended  Ext- no edema bilaterally.  Skin- warm and dry    Current Facility-Administered Medications   Medication Dose Route Frequency    pantoprazole (PROTONIX) tablet 40 mg  40 mg Oral BID AC    metoprolol succinate (TOPROL XL) extended release tablet 200 mg  200 mg Oral Q12H    dilTIAZem (CARDIZEM) tablet 60 mg  60 mg Oral 4 times per day    furosemide (LASIX) tablet 40 mg  40 mg Oral Daily    potassium chloride (KLOR-CON M) extended release tablet 20 mEq  20 mEq Oral BID WC    potassium chloride (KLOR-CON M) extended release tablet 40 mEq  40 mEq Oral PRN    Or    potassium bicarb-citric acid (EFFER-K) effervescent tablet 40 mEq  40 mEq Oral PRN    Or    potassium chloride 10 mEq/100 mL IVPB (Peripheral Line)  10 mEq IntraVENous PRN    magnesium sulfate 2000 mg in 50 mL IVPB premix  2,000 mg IntraVENous PRN    spironolactone (ALDACTONE) tablet 25 mg  25 mg Oral Daily    apixaban (ELIQUIS) tablet 10 mg  10 mg Oral 
                       [facility] CARDIOLOGY PROGRESS NOTE           7/2/2024 10:35 AM    Admit Date: 6/17/2024    Admit Diagnosis: Lactic acidosis [E87.20]  Near syncope [R55]  Atrial fibrillation with rapid ventricular response (HCC) [I48.91]  Multiple subsegmental pulmonary emboli without acute cor pulmonale (HCC) [I26.94]      Subjective:   No complaints this AM    Interval History: (History of pertinent interval events obtained from nursing staff)    ROS:  GEN:  No fever or chills  Cardiovascular:  As noted above  Pulmonary:  As noted above  Neuro:  No new focal motor or sensory loss      Objective:     Vitals:    07/02/24 0945 07/02/24 1000 07/02/24 1015 07/02/24 1023   BP:  (!) 82/52  (!) 100/57   Pulse: (!) 115 (!) 123 (!) 122 (!) 123   Resp: 22 21 21 23   Temp:       TempSrc:       SpO2: 92% 93% 94% 95%   Weight:       Height:           Physical Exam:  General- NAD  Neck- supple, no JVD  CV- irreg irreg no MRG  Lung- reduced air movement, rales  Abd- soft, nontender, nondistended  Ext- no edema bilaterally.  Skin- warm and dry    Current Facility-Administered Medications   Medication Dose Route Frequency    potassium bicarb-citric acid (EFFER-K) effervescent tablet 20 mEq  20 mEq Oral Daily    lubrifresh P.M. (artificial tears) ophthalmic ointment   Both Eyes PRN    glucose chewable tablet 16 g  4 tablet Oral PRN    dextrose bolus 10% 125 mL  125 mL IntraVENous PRN    Or    dextrose bolus 10% 250 mL  250 mL IntraVENous PRN    Glucagon Emergency KIT 1 mg  1 mg SubCUTAneous PRN    dextrose 10 % infusion   IntraVENous Continuous PRN    insulin lispro (HUMALOG,ADMELOG) injection vial 0-4 Units  0-4 Units SubCUTAneous TID WC    insulin lispro (HUMALOG,ADMELOG) injection vial 0-4 Units  0-4 Units SubCUTAneous Nightly    amiodarone (CORDARONE) 450 mg in dextrose 5 % 250 mL infusion ([id number])  0.5 mg/min IntraVENous Continuous    metoprolol succinate (TOPROL XL) extended release tablet 100 mg  100 mg Oral Q12H    
                       [facility] CARDIOLOGY PROGRESS NOTE           6/26/2024 7:16 AM    Admit Date: 6/17/2024    Admit Diagnosis: Lactic acidosis [E87.20]  Near syncope [R55]  Atrial fibrillation with rapid ventricular response (HCC) [I48.91]  Multiple subsegmental pulmonary emboli without acute cor pulmonale (HCC) [I26.94]    Assessment:   Principal Problem:    Atrial fibrillation with rapid ventricular response (HCC)  Active Problems:    Pulmonary embolism (HCC)    Elevated LFTs    Paroxysmal atrial fibrillation (HCC)    Myocardiopathy (HCC)    Hyperlipidemia    Hypertension    Hypernatremia    Lactic acidosis    JOE (acute kidney injury) (HCC)    AAA (abdominal aortic aneurysm) (HCC)    Bilateral pleural effusion    Elevated bilirubin    Lower extremity edema    Acute respiratory failure with hypoxia (HCC)    Pleural effusion, right  Resolved Problems:    Hypoxia      Plan:   AF with RVR - finding of large TRACE thrombus on MAGUI 6/25. Rate control best option for AF treatment currently. Consider pace/ablate as other medical issues improve. Stop amiodarone. Continue metoprolol and Eliquis.   Large TRACE thrombus - continue Eliquis.   PE - continue Eliquis.   HFrEF - EF 40-45%, continue GDMT as tolerated.  CBD stones - complicates care, s/p ERCP, per GI. Following liver labs.   Pulmonary effusion - continue diuresis.   Hypoxia - multifactorial, continue treatments of above.   Dispo - very complex pt with high risk for further decompensation. Prognosis is guarded.     Thank you for allowing me to participate in the electrophysiologic care of this most pleasant patient. Please feel free to contact me if there are any questions or concerns.    Ross Diop MD, MS  Clinical Cardiac Electrophysiology  [facility] Cardiology    Subjective:   No complaints this AM, no chest pain or shortness of breath    Interval History: (History of pertinent interval events obtained from nursing staff)    ROS:  GEN:  No fever or 
                 [facility] CARDIOLOGY PROGRESS NOTE           6/23/2024 8:17 AM    Admit Date: 6/17/2024      Subjective:   SAURAVEON. Remains in afib with RVR. Given loading dose of digoxin 250mcg overnight. No chest pain or dyspnea.    ROS:  Cardiovascular:  As noted above    Objective:      Vitals:    06/23/24 0254 06/23/24 0429 06/23/24 0647 06/23/24 0755   BP: 110/83 105/69 106/88 129/84   Pulse: (!) 138 (!) 155 (!) 147 (!) 154   Resp:  16     Temp:  97.9 °F (36.6 °C)  97.3 °F (36.3 °C)   TempSrc:  Oral  Axillary   SpO2:  98%  99%   Weight:       Height:           Physical Exam:  General-No Acute Distress  Neck- supple, no JVD  CV- Irregular tachycardia no MRG  Lung- clear bilaterally  Abd- soft, nontender, nondistended  Ext- trace edema bilaterally.  Skin- warm and dry      Data Review:   Recent Labs     06/23/24  0338 06/21/24  0445 06/20/24  0608   WBC 8.2 9.7 8.1   HGB 13.4 13.4 12.3   HCT 43.4 42.4 39.7   .8* 103.2* 104.2*   * 110* 101*         Recent Labs     06/23/24  0338 06/22/24  1244 06/21/24  0445   *   < > 147*   K 3.3*  --  3.3*     --  105   CO2 40*  --  27   BUN 26*  --  38*   CREATININE 0.75  --  0.87   GLUCOSE 198*  --  192*   CALCIUM 8.6*  --  8.4*   BILITOT  --   --  1.0   ALKPHOS  --   --  186*   AST  --   --  117*   ALT  --   --  255*   LABGLOM 77  --  64   GLOB  --   --  2.3    < > = values in this interval not displayed.         No results for input(s): \"CKTOTAL\", \"CKMB\", \"CKMBINDEX\", \"DDIMER\", \"TROPONINI\" in the last 720 hours.           Assessment/Plan:     Active Hospital Problems    Myocardiopathy (HCC)      Pulmonary embolism (HCC)      Elevated LFTs      Paroxysmal atrial fibrillation (HCC)      Lower extremity edema      Acute respiratory failure with hypoxia (HCC)      Pleural effusion, right      Hypernatremia      Lactic acidosis      JOE (acute kidney injury) (HCC)      AAA (abdominal aortic aneurysm) (HCC)      Bilateral pleural effusion      *Atrial 
               Juany Foy  Admission Date: 6/17/2024         Daily Progress Note: 6/19/2024    The patient's chart is reviewed and the patient is discussed with the staff.    Background: 89 y.o female with hx of HTN, HLD, a-fib on eliquis, HFpEF who presented to ER on 6/17 with dizziness and suspected fall. She is unsure if she lost consciousness. In ER, HR was 200 treated with adenosine and cardizem, showing a-fib RVR. Was also noted to have AG metabolic acidosis and acute renal insufficiency, elevated transaminases, right pleural effusion, and bilateral PE. She was started on heparin drip. We were consulted for dyspnea and hypoxia. She reports she has felt poorly for 5 days and was not taking eliquis at that time. She was admitted to CCU initially due to deteriorating respiratory status. ProBNP on admission >28K.     Subjective:     Transferred from ICU yesterday. On 4 lpm NC with sat >95% weaned to 3 lpm during exam. Denies any dyspnea. Rate more controlled on cardizem drip. Remains on heparin drip. Still volume overloaded - net negative overall 1 L.     Current Facility-Administered Medications   Medication Dose Route Frequency    heparin (porcine) injection 6,200 Units  80 Units/kg IntraVENous PRN    heparin (porcine) injection 3,100 Units  40 Units/kg IntraVENous PRN    heparin 25,000 units in dextrose 5% 250 mL (premix) infusion  5-30 Units/kg/hr IntraVENous Continuous    dilTIAZem 100 mg in sodium chloride 0.9 % 100 mL infusion (ADD-[place])  2.5-15 mg/hr IntraVENous Continuous    metoprolol (LOPRESSOR) injection 5 mg  5 mg IntraVENous Q4H PRN    aspirin EC tablet 81 mg  81 mg Oral Daily    escitalopram (LEXAPRO) tablet 20 mg  20 mg Oral Daily    metoprolol tartrate (LOPRESSOR) tablet 50 mg  50 mg Oral BID    levothyroxine (SYNTHROID) tablet 88 mcg  88 mcg Oral Daily    amLODIPine (NORVASC) tablet 5 mg  5 mg Oral Daily    ferrous sulfate (IRON 325) tablet 325 mg  325 mg Oral Lunch    [Held by provider] 
               Juany Foy  Admission Date: 6/17/2024         Daily Progress Note: 6/24/2024    The patient's chart is reviewed and the patient is discussed with the staff.    Background: 89 y.o female with hx of HTN, HLD, a-fib on eliquis, HFpEF who presented to ER on 6/17 with dizziness and suspected fall. She is unsure if she lost consciousness. In ER, HR was 200 treated with adenosine and cardizem, showing a-fib RVR. Was also noted to have AG metabolic acidosis and acute renal insufficiency, elevated transaminases, right pleural effusion, and bilateral PE. She was started on heparin drip. We were consulted for dyspnea and hypoxia. She reports she has felt poorly for 5 days and was not taking eliquis at that time. She was admitted to CCU initially due to deteriorating respiratory status. ProBNP on admission >28K.   Had ERCP 6/20 for CBD stones, stent placed- GI recommended to repeat ERCP in 6-8 weeks.    Subjective:     Currently on 3 L nasal cannula, sats 87 to 91%.  Also in rapid A-fib, heart rate 130-150.  Cardiology following in discussing potential for an ablation.  Patient denies shortness of breath or cough.    Current Facility-Administered Medications   Medication Dose Route Frequency    furosemide (LASIX) injection 20 mg  20 mg IntraVENous BID    potassium chloride (KLOR-CON M) extended release tablet 20 mEq  20 mEq Oral BID WC    metoprolol (LOPRESSOR) tablet 100 mg  100 mg Oral Q6H    potassium chloride (KLOR-CON M) extended release tablet 40 mEq  40 mEq Oral PRN    Or    potassium bicarb-citric acid (EFFER-K) effervescent tablet 40 mEq  40 mEq Oral PRN    Or    potassium chloride 10 mEq/100 mL IVPB (Peripheral Line)  10 mEq IntraVENous PRN    magnesium sulfate 2000 mg in 50 mL IVPB premix  2,000 mg IntraVENous PRN    digoxin (LANOXIN) tablet 62.5 mcg  62.5 mcg Oral Daily    spironolactone (ALDACTONE) tablet 25 mg  25 mg Oral Daily    apixaban (ELIQUIS) tablet 10 mg  10 mg Oral BID    Followed by 
               Juany Foy  Admission Date: 6/17/2024         Daily Progress Note: 6/25/2024    The patient's chart is reviewed and the patient is discussed with the staff.    Background: 89 y.o female with hx of HTN, HLD, a-fib on eliquis, HFpEF who presented to ER on 6/17 with dizziness and suspected fall. She is unsure if she lost consciousness. In ER, HR was 200 treated with adenosine and cardizem, showing a-fib RVR. Was also noted to have AG metabolic acidosis and acute renal insufficiency, elevated transaminases, right pleural effusion, and bilateral PE. She was started on heparin drip. We were consulted for dyspnea and hypoxia. She reports she has felt poorly for 5 days and was not taking eliquis at that time. She was admitted to CCU initially due to deteriorating respiratory status. ProBNP on admission >28K.   Had ERCP 6/20 for CBD stones, stent placed- GI recommended to repeat ERCP in 6-8 weeks.    Subjective:     Now on 2 lpm NC with sat >95%; still in rapid a-fib. Cardiology may be planning cardioversion today. No complaints.   On amio drip.    Current Facility-Administered Medications   Medication Dose Route Frequency    metoprolol succinate (TOPROL XL) extended release tablet 100 mg  100 mg Oral Q12H    amiodarone (CORDARONE) 450 mg in dextrose 5 % 250 mL infusion ([id number])  1 mg/min IntraVENous Continuous    furosemide (LASIX) tablet 40 mg  40 mg Oral Daily    potassium chloride (KLOR-CON M) extended release tablet 20 mEq  20 mEq Oral BID WC    potassium chloride (KLOR-CON M) extended release tablet 40 mEq  40 mEq Oral PRN    Or    potassium bicarb-citric acid (EFFER-K) effervescent tablet 40 mEq  40 mEq Oral PRN    Or    potassium chloride 10 mEq/100 mL IVPB (Peripheral Line)  10 mEq IntraVENous PRN    magnesium sulfate 2000 mg in 50 mL IVPB premix  2,000 mg IntraVENous PRN    spironolactone (ALDACTONE) tablet 25 mg  25 mg Oral Daily    apixaban (ELIQUIS) tablet 10 mg  10 mg Oral BID    Followed 
               Juany Foy  Admission Date: 6/17/2024         Daily Progress Note: 6/26/2024    The patient's chart is reviewed and the patient is discussed with the staff.    Background: 89 y.o female with hx of HTN, HLD, a-fib on eliquis, HFpEF who presented to ER on 6/17 with dizziness and suspected fall. She is unsure if she lost consciousness. In ER, HR was 200 treated with adenosine and cardizem, showing a-fib RVR. Was also noted to have AG metabolic acidosis and acute renal insufficiency, elevated transaminases, right pleural effusion, and bilateral PE. She was started on heparin drip. We were consulted for dyspnea and hypoxia. She reports she has felt poorly for 5 days and was not taking eliquis at that time. She was admitted to CCU initially due to deteriorating respiratory status. ProBNP on admission >28K.   Had ERCP 6/20 for CBD stones, stent placed- GI recommended to repeat ERCP in 6-8 weeks.    Subjective:     Currently on 4 L nasal cannula, sats 98%.  Patient had MAGUI 6/25 with evidence of clot, therefore cardioversion was aborted.  EP consulted for potential/consideration of pacemaker/ablation    Current Facility-Administered Medications   Medication Dose Route Frequency    dilTIAZem (CARDIZEM) tablet 60 mg  60 mg Oral 4 times per day    metoprolol succinate (TOPROL XL) extended release tablet 100 mg  100 mg Oral Q12H    furosemide (LASIX) tablet 40 mg  40 mg Oral Daily    potassium chloride (KLOR-CON M) extended release tablet 20 mEq  20 mEq Oral BID WC    potassium chloride (KLOR-CON M) extended release tablet 40 mEq  40 mEq Oral PRN    Or    potassium bicarb-citric acid (EFFER-K) effervescent tablet 40 mEq  40 mEq Oral PRN    Or    potassium chloride 10 mEq/100 mL IVPB (Peripheral Line)  10 mEq IntraVENous PRN    magnesium sulfate 2000 mg in 50 mL IVPB premix  2,000 mg IntraVENous PRN    spironolactone (ALDACTONE) tablet 25 mg  25 mg Oral Daily    apixaban (ELIQUIS) tablet 10 mg  10 mg Oral BID 
              [facility] CARDIOLOGY PROGRESS NOTE           6/19/2024 1:13 PM    Admit Date: 6/17/2024      Subjective:   No cp or inc sob    Objective:      Vitals:    06/19/24 0601 06/19/24 0732 06/19/24 0806 06/19/24 1225   BP:   101/81 91/76   Pulse: 81  89    Resp:  16     Temp:  97.5 °F (36.4 °C)     TempSrc:  Axillary     SpO2:       Weight:       Height:           Physical Exam:  General-No Acute Distress  Neck- supple, no JVD  CV- irregular rate and rhythm no MRG  Lung- clear bilaterally  Abd- soft, nontender, nondistended  Ext- no edema bilaterally.  Skin- warm and dry    Data Review:   Recent Labs     06/18/24  0027 06/18/24  0629 06/19/24  0449   NA  --  147* 146*   K  --  4.0 3.5   MG  --   --  1.9   BUN  --  43* 43*   WBC 10.4  --  10.1   HGB 12.8  --  13.0   HCT 42.0  --  41.2   *  --  112*   INR 2.1  --   --        Assessment/Plan:     VTE after off Eliquis 5 days  Paf  Htn  Inc. Lft's  Jitendra  ///  Change amlodipine to diltiazem  Stop iv diltiazem  Stop asa       JACKSON PENA MD  6/19/2024 1:13 PM   
              [facility] CARDIOLOGY PROGRESS NOTE           6/21/2024 9:44 AM    Admit Date: 6/17/2024      Subjective:     In atrial fibrillation with intermittent mild RVR.  Currently on 4 L nasal cannula.  Denies any chest discomfort.    ROS:  Cardiovascular:  As noted above    Objective:      Vitals:    06/20/24 2049 06/20/24 2358 06/21/24 0344 06/21/24 0807   BP: 128/77 124/78 122/77 (!) 124/91   Pulse: 94 89 69 (!) 107   Resp: 15 17 18 18   Temp: 97.2 °F (36.2 °C) 97.4 °F (36.3 °C) 97 °F (36.1 °C) (!) 96.1 °F (35.6 °C)   TempSrc: Oral Temporal Temporal Temporal   SpO2: 92%  97% 92%   Weight:       Height:           Physical Exam:  General-No Acute Distress  Neck- supple, no JVD  CV- irregular rate and rhythm no MRG  Lung- clear bilaterally  Abd- soft, nontender, nondistended  Ext- 1+ edema bilaterally.  Skin- warm and dry    Data Review:   Recent Labs     06/19/24  0449 06/20/24  0428 06/20/24  0608 06/21/24  0445   * 143  --  147*   K 3.5 3.7  --  3.3*   MG 1.9  --   --  1.8   BUN 43* 44*  --  38*   WBC 10.1  --  8.1 9.7   HGB 13.0  --  12.3 13.4   HCT 41.2  --  39.7 42.4   *  --  101* 110*       Assessment/Plan:     Principal Problem:    Atrial fibrillation with rapid ventricular response (HCC)  -P.o. Cardizem has been initiated and hold off in the setting of left ventricular dysfunction.  Initiate Toprol-XL and titrate as needed. On anticoagulation with heparin.  Plan transition to p.o. if no further planned procedures.  Defer to pulmonology regarding restarting heparin; currently being evaluated for possible repeat thoracentesis  -Uncertain duration; prior history of PAF.  Echo with severe biatrial enlargement.  Defer rhythm control consideration at this time the setting of noted PE/severe biatrial enlargement.    Active Problems:    Pulmonary embolism (HCC)  -See above with anticoagulation.      Elevated LFTs    Elevated bilirubin  -Underwent ERCP with noted CBD stones status post stent 
              [facility] CARDIOLOGY PROGRESS NOTE           6/20/2024 2:24 PM    Admit Date: 6/17/2024      Subjective:   No cp or inc sob    Objective:      Vitals:    06/20/24 0338 06/20/24 0803 06/20/24 0807 06/20/24 1130   BP: 114/79  117/74 118/67   Pulse: 72 63 98 77   Resp: 19 22 24   Temp: 97.3 °F (36.3 °C) 97.5 °F (36.4 °C)  97.4 °F (36.3 °C)   TempSrc: Axillary Axillary  Oral   SpO2: 97% 95%  97%   Weight:       Height:           Physical Exam:  General-No Acute Distress, in a chair  Neck- supple, + JVD  CV- irregular rate and rhythm no MRG  Lung- clear bilaterally  Abd- soft, nontender, nondistended  Ext- + edema bilaterally.  Skin- warm and dry    Data Review:   Recent Labs     06/18/24  0027 06/18/24  0629 06/19/24  0449 06/20/24  0428 06/20/24  0608   NA  --    < > 146* 143  --    K  --    < > 3.5 3.7  --    MG  --   --  1.9  --   --    BUN  --    < > 43* 44*  --    WBC 10.4  --  10.1  --  8.1   HGB 12.8  --  13.0  --  12.3   HCT 42.0  --  41.2  --  39.7   *  --  112*  --  101*   INR 2.1  --   --   --   --     < > = values in this interval not displayed.       Assessment/Plan:     S/p VTE after being off Eliquis 5 days  Cardiomyopathy with LV and RV dysfunction and volume overload  Persistent a fib, new since 2/2024   Htn  Inc. Lft's  Jitendra  ///  Increase lasix to 40 iv Bid.       JACKSON PENA MD  6/20/2024 2:24 PM   
       Hospitalist Progress Note   Admit Date:  2024  7:32 PM   Name:  Juany Foy   Age:  89 y.o.  Sex:  female  :  1935   MRN:  [id number]   Room:  [facility]/    Presenting/Chief Complaint: Irregular Heart Beat     Reason(s) for Admission: Lactic acidosis [E87.20]  Near syncope [R55]  Atrial fibrillation with rapid ventricular response (HCC) [I48.91]  Multiple subsegmental pulmonary emboli without acute cor pulmonale (HCC) [I26.94]     Hospital Course:     Juany Foy is a 89 y.o. female with medical history of HTN, afib on eliquis, hypothyroidism, admitted with near syncope, JOE, hypernatremia, sepsis, elevated LFTs, acute hypoxia.     She lives alone and admits to missing meds due to being ill and not wanting to waste them if she wasn't going to be able to take them      CXR widened mediastinum and right base airspace disease    CTA chest / AP shows PE, effusions, right liver cyst , AAA 2.5 cm     In afib with RVR      Admitted to ICU   On airvo, weaned to NC  Seen by pulmonary  Was Receiving IV lasix   No need for thoracentesis at this time per pulmonology     ECHO EF 40-45%, indeterminant diastolic function, TR, RVSP 38    Cardiology following  On IV heparin and IV cardizem drips  Oral agent added        GI seen for elevated LFTS  ABD US right liver cyst  Hepatitis negative   Unable to have MRI ABD  due to metal   S/p ERCP on  with stone removal and stent placement   Will need f/u in 6-8 weeks with GI- they will call her    Renal function improving     Discharge plans pending STR    Subjective & 24hr Events:   Patient seen and evaluated.  No adverse overnight events reported  She is status post ERCP with stones removed   N.p.o. for MAGUI cardioversion today  Re-started back on her eliquis  Heart rate remains uncontrolled- 140's -150's when I see her despite charted heart rates  Now on digoxin  Cxr shows unresolving effusions despite diuretics- suspect cardiac driven     Assessment & Plan: 
       Hospitalist Progress Note   Admit Date:  2024  7:32 PM   Name:  Juany Foy   Age:  89 y.o.  Sex:  female  :  1935   MRN:  [id number]   Room:  [facility]/    Presenting/Chief Complaint: Irregular Heart Beat     Reason(s) for Admission: Lactic acidosis [E87.20]  Near syncope [R55]  Atrial fibrillation with rapid ventricular response (HCC) [I48.91]  Multiple subsegmental pulmonary emboli without acute cor pulmonale (HCC) [I26.94]     Hospital Course:   Juany Foy is a 89 y.o. female with medical history of HTN, afib on eliquis, hypothyroidism, admitted with near syncope, JOE, hypernatremia, sepsis, elevated LFTs, acute hypoxia.  Patient was admitted to the ICU.  Noted to be in A-fib with RVR.  Cardiology was consulted.  MAGUI aborted due to atrial thrombosis.  GI was seen due to elevated LFTs.  Patient is status post ERCP on  with stone removal and stent placements.  Patient made DNR on  per family.  PT/OT recommended discharge to short-term rehab      Subjective & 24hr Events:   Seen and examined at bedside.  No acute events overnight.  Denies any complaints.      Assessment & Plan:       Atrial fibrillation with rapid ventricular response (HCC)    Atrial Thrombus  Continue Toprol, Cardizem, Eliquis  Patient is rate controlled  Goal K >4, Mg >2  Cardiology consulted       Acute Hypoxia    Pleural Effusions  Pulmonology consulted  Wean oxygen as tolerated      CHF with mid range EF 40-45% and suspected DD:  On lasix, jardiance, toprol, aldactone, cardizem  BP cannot tolerate ACE/ARB      Iron Def Anemia  Hb normal   Cont PO iron  Daily CBC      Pulmonary embolism (HCC)  Continue Eliquis      Anticipated Discharge Arrangements:   Skilled Nursing Facility    PT/OT evals ordered?  Therapy evals ordered  Diet:  ADULT DIET; Easy to Chew  ADULT ORAL NUTRITION SUPPLEMENT; Breakfast, Lunch, Dinner; Standard High Calorie/High Protein Oral Supplement  VTE prophylaxis: Already on 
       Hospitalist Progress Note   Admit Date:  2024  7:32 PM   Name:  Juany Foy   Age:  89 y.o.  Sex:  female  :  1935   MRN:  [id number]   Room:  [facility]/    Presenting/Chief Complaint: Irregular Heart Beat     Reason(s) for Admission: Lactic acidosis [E87.20]  Near syncope [R55]  Atrial fibrillation with rapid ventricular response (HCC) [I48.91]  Multiple subsegmental pulmonary emboli without acute cor pulmonale (HCC) [I26.94]     Hospital Course:     Copied from prior provider HPI/summary:  Patient is an 88 y/o female with medical history of hypertension, atrial fibrillation, hypothyroidism who presented to ED with report of a near syncopal episode at home associated with dizziness. EMS found patient tachycardic into 200's. Labs notable for Na 154 Cr 1.91 AG 25 LA 4.4 pBNP 28,871    Tbili 4.9.  CXR with widening of mediastinum with right basilar airspace disease.  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 EKG showed A-fib RVR and patient was started on Heparin and Cardizem infusions. Patient was also hypoxic to 80s on 5 L O2 nasal cannula and required respiratory support.. Patient was admitted to ICU 24. Cardiology consulted. Eventually transitioned to amiodarone gtt. MAGUI  for cardioversion but aborted due to atrial thrombus. Gastroenterology consulted for evaluation of elevated LFT's. Patient underwent ERCP  with stone removal and stent placement. Patient was eventually moved to medical floor. Family transitioned patient to DNR code status . Patient then experienced respiratory distress and was transferred back to ICU. Patient required support of Airvo, CPAP, and Bipap throughout her hospitalization. Chest x-ray showed large bilateral pleural effusions.  Pulmonology consulted, s/p thoracentesis, no improvement. Family 
       Hospitalist Progress Note   Admit Date:  2024  7:32 PM   Name:  Juany Foy   Age:  89 y.o.  Sex:  female  :  1935   MRN:  [id number]   Room:  [facility]/    Presenting/Chief Complaint: Irregular Heart Beat     Reason(s) for Admission: Lactic acidosis [E87.20]  Near syncope [R55]  Atrial fibrillation with rapid ventricular response (HCC) [I48.91]  Multiple subsegmental pulmonary emboli without acute cor pulmonale (HCC) [I26.94]     Hospital Course:     Copied from prior provider HPI/summary:  Patient is an 89 y/o female with medical history of hypertension, atrial fibrillation, hypothyroidism who presented to ED with report of a near syncopal episode at home associated with dizziness. EMS found patient tachycardic into 200's. Labs notable for Na 154 Cr 1.91 AG 25 LA 4.4 pBNP 28,871    Tbili 4.9.  CXR with widening of mediastinum with right basilar airspace disease.  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 EKG showed A-fib RVR and patient was started on Heparin and Cardizem infusions. Patient was also hypoxic to 80s on 5 L O2 nasal cannula and required respiratory support.. Patient was admitted to ICU 24. Cardiology consulted. Eventually transitioned to amiodarone gtt. MAGUI  for cardioversion but aborted due to atrial thrombus. Gastroenterology consulted for evaluation of elevated LFT's. Patient underwent ERCP  with stone removal and stent placement. Patient was eventually moved to medical floor. Family transitioned patient to DNR code status . Patient then experienced respiratory distress and was transferred back to ICU. Patient required support of Airvo, CPAP, and Bipap throughout her hospitalization. Chest x-ray showed large bilateral pleural effusions.  Pulmonology consulted, s/p thoracentesis, no improvement. Family 
       Hospitalist Progress Note   Admit Date:  2024  7:32 PM   Name:  Juany Foy   Age:  89 y.o.  Sex:  female  :  1935   MRN:  [id number]   Room:  [facility]/    Presenting/Chief Complaint: Irregular Heart Beat     Reason(s) for Admission: Lactic acidosis [E87.20]  Near syncope [R55]  Atrial fibrillation with rapid ventricular response (HCC) [I48.91]  Multiple subsegmental pulmonary emboli without acute cor pulmonale (HCC) [I26.94]     Hospital Course:     Juany Foy is a 89 y.o. female with medical history of HTN, afib on eliquis, hypothyroidism, admitted with near syncope, JOE, hypernatremia, sepsis, elevated LFTs, acute hypoxia.     She lives alone and admits to missing meds due to being ill and not wanting to waste them if she wasn't going to be able to take them      CXR widened mediastinum and right base airspace disease    CTA chest / AP shows PE, effusions, right liver cyst , AAA 2.5 cm     In afib with RVR      Admitted to ICU   On airvo, weaned to NC  Seen by pulmonary  Receiving IV lasix   Possible thoracentesis pending course    ECHO EF 40-45%, indeterminant diastolic function, TR, RVSP 38    Cardiology following  On IV heparin and IV cardizem drips  Oral agent added        GI seen for elevated LFTS  ABD US right liver cyst  Hepatitis negative   Plans for EUS - as unable to have MRI ABD  due to metal   seen by oncology who agrees with current plans    Renal function improving     Discharge plans pending STR    Subjective & 24hr Events:     Updated son Luigi bedside  Able to eat  Likes cold water  Very weak   Poor insight       Assessment & Plan:     Principal Problem:    Atrial fibrillation with rapid ventricular response (HCC)  Plan:   24  IV heparin/ IV cardizem  Oral cardizem / lopressor  Cardiology following       Active Problems:    Pulmonary embolism (HCC)  Plan:   24  IV heparin       Acute Hypoxia  Plan:   24  Wean O2 as tolerant  IV lasix         Bilateral 
       Hospitalist Progress Note   Admit Date:  2024  7:32 PM   Name:  Juany Foy   Age:  89 y.o.  Sex:  female  :  1935   MRN:  [id number]   Room:  [facility]/    Presenting/Chief Complaint: Irregular Heart Beat     Reason(s) for Admission: Lactic acidosis [E87.20]  Near syncope [R55]  Atrial fibrillation with rapid ventricular response (HCC) [I48.91]  Multiple subsegmental pulmonary emboli without acute cor pulmonale (HCC) [I26.94]     Hospital Course:   Juany Foy is a 89 y.o. female with medical history of HTN, afib on eliquis, hypothyroidism, admitted with near syncope, JOE, hypernatremia, sepsis, elevated LFTs, acute hypoxia.   CXR widened mediastinum and right base airspace disease  CTA chest / AP showed acute PE, effusions, right liver cyst , AAA 2.5 cm   Noted to be in afib with RVR  Admitted to ICU   On airvo  Was Receiving IV lasix   No need for thoracentesis at this time per pulmonology   Weaned to NC  ECHO EF 40-45%, indeterminant diastolic function, TR, RVSP 38  on IV heparin and IV cardizem drips  Cardiology consulted  GI seen for elevated LFTS  ABD US right liver cyst  Hepatitis negative   Unable to have MRI due to metal   S/p ERCP on  with stone removal and stent placement   Will need f/u in 6-8 weeks with GI- they will call her    After discussion with family  made DNR.      Subjective & 24hr Events:   Some RUE swelling but otherwise no complaints.  Afib still uncontrolled.  BP improved.  Oxygenation improving but still on 4L      Assessment & Plan:       Atrial fibrillation with rapid ventricular response (HCC)  2024  Difficult to control HR.  No rhythm control due to MAGUI showing atrial thrombus.    added cardizem today  Give Mag IV today.    Goal K >4 and Mg >2.  cont metoprolol  Cont eliquis  Consider adding midodrine for BP support if needed; BP currently better/OK  daily BMP, Mg     Acute Hypoxia  O2 Flow Rate (L/min): 4 L/min.  Wean oxygen as able.  Treat CHF 
       Hospitalist Progress Note   Admit Date:  2024  7:32 PM   Name:  Juany Foy   Age:  89 y.o.  Sex:  female  :  1935   MRN:  [id number]   Room:  [facility]/    Presenting/Chief Complaint: Irregular Heart Beat     Reason(s) for Admission: Lactic acidosis [E87.20]  Near syncope [R55]  Atrial fibrillation with rapid ventricular response (HCC) [I48.91]  Multiple subsegmental pulmonary emboli without acute cor pulmonale (HCC) [I26.94]     Hospital Course:     Juany Foy is a 89 y.o. female with medical history of HTN, afib on eliquis, hypothyroidism, admitted with near syncope, JOE, hypernatremia, sepsis, elevated LFTs, acute hypoxia.     She lives alone and admits to missing meds due to being ill and not wanting to waste them if she wasn't going to be able to take them      CXR widened mediastinum and right base airspace disease    CTA chest / AP shows PE, effusions, right liver cyst , AAA 2.5 cm     In afib with RVR      Admitted to ICU   On airvo, weaned to NC  Seen by pulmonary  Was Receiving IV lasix   No need for thoracentesis at this time per pulmonology     ECHO EF 40-45%, indeterminant diastolic function, TR, RVSP 38    Cardiology following  On IV heparin and IV cardizem drips  Oral agent added        GI seen for elevated LFTS  ABD US right liver cyst  Hepatitis negative   Unable to have MRI ABD  due to metal   S/p ERCP on  with stone removal and stent placement   Will need f/u in 6-8 weeks with GI- they will call her    Renal function improving     Discharge plans pending STR    Subjective & 24hr Events:   Patient seen and evaluated.  She is status post ERCP with stones removed   No further procedures planned per pulmonology  Re-started back on her eliquis  Heart rate remains uncontrolled  She was started on digoxin overnight    Assessment & Plan:     Principal Problem:    Atrial fibrillation with rapid ventricular response (HCC)  2024  She was on Cardizem and Lopressor now 
       Hospitalist Progress Note   Admit Date:  2024  7:32 PM   Name:  Juany Foy   Age:  89 y.o.  Sex:  female  :  1935   MRN:  [id number]   Room:  [address] [place] [address]    Presenting/Chief Complaint: Irregular Heart Beat     Reason(s) for Admission: Lactic acidosis [E87.20]  Near syncope [R55]  Atrial fibrillation with rapid ventricular response (HCC) [I48.91]  Multiple subsegmental pulmonary emboli without acute cor pulmonale (HCC) [I26.94]     Hospital Course:   NOTICE FOR THE PATIENT: This clinical note is not designed to be interpreted by patients and we do not recommend reading it unless you have medical training. These notes may contain candid and (unintentionally) offensive descriptions, which are sometimes required for accurate documentation. If you would like more information about your healthcare, please obtain it directly by myself or my staff/colleagues - never solely from the notes. Thank you for your understanding and cooperation.     Please refer to the admission H&P for details of presentation.      In summary, Juany Foy is a 89 y.o. female with medical history significant for HTN, afib, hypothyroidism who presented to ED with report of a near syncopal episode at home.  Patient reported some dizziness.  EMS was called out and found patient to be tachycardic in the 200s. Laboratory workup showed Na is 154, Cr is 1.91, anion gap is 25, lactic acid is 4.4, pBNP is 28.871, ALP is 227, ALT is 387, AST is 646, t.bili is 4.9.  CXR with widening of mediastinum with right basilar airspace disease.  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    EKG shows A-fib RVR and patient was started on heparin gtt. as well Cardizem gtt.  Patient also hypoxic to 80s on 5 L O2 nasal cannula.      Subjective/24 hr Events (24) :  Patient is seen and examined at bedside. 
       Hospitalist Progress Note   Admit Date:  2024  7:32 PM   Name:  Juany Foy   Age:  89 y.o.  Sex:  female  :  1935   MRN:  [id number]   Room:  [facility]/    Presenting/Chief Complaint: Irregular Heart Beat     Reason(s) for Admission: Lactic acidosis [E87.20]  Near syncope [R55]  Atrial fibrillation with rapid ventricular response (HCC) [I48.91]  Multiple subsegmental pulmonary emboli without acute cor pulmonale (HCC) [I26.94]     Hospital Course:     Juany Foy is a 89 y.o. female with medical history of HTN, afib on eliquis, hypothyroidism, admitted with near syncope, JOE, hypernatremia, sepsis, elevated LFTs, acute hypoxia.     She lives alone and admits to missing meds due to being ill and not wanting to waste them if she wasn't going to be able to take them      CXR widened mediastinum and right base airspace disease    CTA chest / AP shows PE, effusions, right liver cyst , AAA 2.5 cm     In afib with RVR      Admitted to ICU   On airvo, weaned to NC  Seen by pulmonary  Receiving IV lasix   Possible thoracentesis pending course    ECHO EF 40-45%, indeterminant diastolic function, TR, RVSP 38    Cardiology following  On IV heparin and IV cardizem drips  Oral agent added        GI seen for elevated LFTS  ABD US right liver cyst  Hepatitis negative   Plans for EUS - as unable to have MRI ABD  due to metal   seen by oncology who agrees with current plans    Renal function improving     Discharge plans pending STR    Subjective & 24hr Events:   Patient seen and evaluated.  She is status post ERCP with stones removed   No further procedures planned per pulmonology  Very weak   Poor insight   Does not wear oxygen at home      Assessment & Plan:     Principal Problem:    Atrial fibrillation with rapid ventricular response (HCC)  2024  She has been transitioned to p.o.cardizem / lopressor  Cardiology following input appreciate      Active Problems:    Pulmonary embolism 
       Hospitalist Progress Note   Admit Date:  2024  7:32 PM   Name:  Juany Foy   Age:  89 y.o.  Sex:  female  :  1935   MRN:  [id number]   Room:  [facility]/    Presenting/Chief Complaint: Irregular Heart Beat     Reason(s) for Admission: Lactic acidosis [E87.20]  Near syncope [R55]  Atrial fibrillation with rapid ventricular response (HCC) [I48.91]  Multiple subsegmental pulmonary emboli without acute cor pulmonale (HCC) [I26.94]     Hospital Course:     Copied from prior provider HPI/summary:  Patient is an 88 y/o female with medical history of hypertension, atrial fibrillation, hypothyroidism who presented to ED with report of a near syncopal episode at home associated with dizziness. EMS found patient tachycardic into 200's. Labs notable for Na 154 Cr 1.91 AG 25 LA 4.4 pBNP 28,871    Tbili 4.9.  CXR with widening of mediastinum with right basilar airspace disease.  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 EKG showed A-fib RVR and patient was started on Heparin and Cardizem infusions. Patient was also hypoxic to 80s on 5 L O2 nasal cannula and required respiratory support.. Patient was admitted to ICU 24. Cardiology consulted. Eventually transitioned to amiodarone gtt. MAGUI  for cardioversion but aborted due to atrial thrombus. Gastroenterology consulted for evaluation of elevated LFT's. Patient underwent ERCP  with stone removal and stent placement. Patient was eventually moved to medical floor. Family transitioned patient to DNR code status . Patient then experienced respiratory distress and was transferred back to ICU. Patient required support of Airvo, CPAP, and Bipap throughout her hospitalization. Chest x-ray showed large bilateral pleural effusions.  Pulmonology consulted, s/p thoracentesis, no improvement. Family 
       Hospitalist Progress Note   Admit Date:  2024  7:32 PM   Name:  Juany Foy   Age:  89 y.o.  Sex:  female  :  1935   MRN:  [id number]   Room:  [facility]/    Presenting/Chief Complaint: Irregular Heart Beat     Reason(s) for Admission: Lactic acidosis [E87.20]  Near syncope [R55]  Atrial fibrillation with rapid ventricular response (HCC) [I48.91]  Multiple subsegmental pulmonary emboli without acute cor pulmonale (HCC) [I26.94]     Hospital Course:     Juany Foy is a 89 y.o. female with medical history of HTN, afib on eliquis, hypothyroidism, admitted with near syncope, JOE, hypernatremia, sepsis, elevated LFTs, acute hypoxia.     She lives alone and admits to missing meds due to being ill and not wanting to waste them if she wasn't going to be able to take them      CXR widened mediastinum and right base airspace disease    CTA chest / AP shows PE, effusions, right liver cyst , AAA 2.5 cm     In afib with RVR      Admitted to ICU   On airvo, weaned to NC  Seen by pulmonary  Receiving IV lasix   Possible thoracentesis pending course    ECHO EF 40-45%, indeterminant diastolic function, TR, RVSP 38    Cardiology following  On IV heparin and IV cardizem drips  Oral agent added        GI seen for elevated LFTS  ABD US right liver cyst  Hepatitis negative   Plans for EUS 6-20 as unable to have MRI ABD  due to metal   seen by oncology who agrees with current plans    Renal function improving     Discharge plans pending STR    Subjective & 24hr Events:   Patient seen and evaluated.  She is status post ERCP with stones removed   No further procedures planned per pulmonology  Re-started back on her eliquis  Heart rate uncontrolled today     Assessment & Plan:     Principal Problem:    Atrial fibrillation with rapid ventricular response (HCC)  2024  She has been transitioned to p.o.cardizem / lopressor  Cardiology following input appreciate  They have switched her to Lopressor 50 mg every 6 
   07/02/24 2005   NIV Type   $NIV $Daily Charge   Equipment Type V60   Mode CPAP   Mask Type Under the nose   Mask Size Small   Assessment   Pulse (!) 112   Respirations 20   BP 92/65   SpO2 96 %   Comfort Level Good   Using Accessory Muscles No   Mask Compliance Good   Skin Assessment Clean, dry, & intact   Settings/Measurements   PIP Observed 8 cm H20   CPAP/EPAP 8 cmH2O   Vt (Measured) 255 mL   FiO2  70 %   Minute Volume (L/min) 5.2 Liters   Mask Leak (lpm) 0 lpm   Patient's Home Machine No   Electrical Safety Check Performed Yes   Alarm Settings   Alarms On Y   Low Pressure (cmH2O) 5 cmH2O   High Pressure (cmH2O) 35 cmH2O   Apnea (secs) 20 secs   RR Low (bpm) 10   RR High (bpm) 50 br/min       
   07/03/24 1934   NIV Type   Equipment Type V60   Mode CPAP   Mask Type Under the nose   Mask Size Small   Assessment   Pulse (!) 105   Respirations 30   SpO2 95 %   Comfort Level Good   Using Accessory Muscles No   Mask Compliance Good   Skin Assessment Clean, dry, & intact   Settings/Measurements   PIP Observed 13 cm H20   CPAP/EPAP 12 cmH2O   Vt (Measured) 239 mL   FiO2  60 %   Minute Volume (L/min) 7.1 Liters   Mask Leak (lpm) 0 lpm   Patient's Home Machine No   Alarm Settings   Alarms On Y   Low Pressure (cmH2O) 5 cmH2O   High Pressure (cmH2O) 35 cmH2O   Apnea (secs) 20 secs   RR Low (bpm) 10   RR High (bpm) 50 br/min       
  Jaylon Rowland/[facility] Critical Care Note:: 6/18/2024  Juany Foy  Admission Date: 6/17/2024     Length of Stay: 1 days    Background: 89 y.o. female with hypertension, hyperlipidemia, a fib on eliquis, HFpEF who presented to ER on 6/17 with dizziness and suspected fall. HR was 200 and she was treated with adenosine and cardizem.  Also noted were AG metabolic acidosis and acute renal insufficiency, elevated transaminases, R pleural effusion and bilateral PE.  She is now on heparin infusion and [facility] was consulted for dyspnea and hypoxia.  She has felt ill for the last 5 days and wasn't taking eliquis during that time.      Notable PMH: atrial fib    24 Hour events:   Currently 96% on 45% FiO2 with HR 139bpm.  Na improved at 147 and HCO3 is 20. No leukocytosis or fevers.  BUN/creatinine is 43/1.54 and UOP was 450ml    Review of Systems: Comprehensive ROS negative except in HPI    Lines: (insertion date)    External Urinary Catheter (Active)      Drips: current dose (range)  Dose (units/hr) Heparin: 0 Units/hr (Xa > 200)     Pertinent Exam:         Blood pressure 125/75, pulse (!) 139, temperature 97.7 °F (36.5 °C), temperature source Axillary, resp. rate 21, height 1.524 m (5'), weight 67 kg (147 lb 11.3 oz), SpO2 96 %.   Intake/Output Summary (Last 24 hours) at 6/18/2024 0757  Last data filed at 6/18/2024 0755  Gross per 24 hour   Intake 248.45 ml   Output 450 ml   Net -201.55 ml     Constitutional: awake, alert, pleasant  EENMT:  ? Mild jaundice, pupils equal, oral mucosa moist  Respiratory: decreased R base  Cardiovascular: irreg irreg  Gastrointestinal:  soft with no tenderness; positive bowel sounds present  Musculoskeletal:  warm with no cyanosis, trace pitting lower extremity edema  Skin:  no jaundice or ecchymosis  Neurologic: normal    CTA    CT Result (most recent):  CTA CHEST ABDOMEN PELVIS W WO CONTRAST 06/17/2024    Narrative  Called for Critical Findings by Cristobal Aguiar to  
  Jaylon Rowland/[facility] Critical Care Note:: 7/2/2024  Juany Foy  Admission Date: 6/17/2024     Length of Stay: 15 days    Background:   89 y.o female with hx of HTN, HLD, a-fib on eliquis, HFpEF who presented to ER on 6/17 with dizziness and suspected fall. She is unsure if she lost consciousness. In ER, HR was 200 treated with adenosine and cardizem, showing a-fib RVR. Was also noted to have AG metabolic acidosis and acute renal insufficiency, elevated transaminases, right pleural effusion, and very small bilateral PE. She was started on heparin drip. We were consulted for dyspnea and hypoxia. She reports she has felt poorly for 5 days and was not taking eliquis at that time. She was admitted to CCU initially due to deteriorating respiratory status. ProBNP on admission >28K.   Had ERCP 6/20 for CBD stones, stent placed- GI recommended to repeat ERCP in 6-8 weeks.   Patient had MAGUI 6/25 with evidence of clot, therefore cardioversion was aborted.  EP consulted for potential/consideration of pacemaker/ablation    Notable PMH:  has no past medical history on file.    24 Hour events:   Heparin on hold for thoracentesis today  Using Bipap alternating with Airvo  Amiodarone added for rate control    Review of Systems: Unable to obtain due to patient factors.     Lines: (insertion date)    Urinary Catheter 06/22/24 (Active)      Drips: current dose (range)  Dose (mcg/kg/min) Propofol : *20 mg  Dose (units/hr) Heparin: 0 Units/hr  Dose (mg/min) Amiodarone: 0.5 mg/min     Pertinent Exam:         Blood pressure 105/68, pulse (!) 113, temperature (!) 96.7 °F (35.9 °C), temperature source Axillary, resp. rate 23, height 1.524 m (5'), weight 63.7 kg (140 lb 6.9 oz), SpO2 96 %.   Intake/Output Summary (Last 24 hours) at 7/2/2024 0935  Last data filed at 7/2/2024 0604  Gross per 24 hour   Intake 1237.72 ml   Output 650 ml   Net 587.72 ml     Constitutional: elderly white female sitting upright in bed. Appears comfortable 
  Jaylon Rowland/[facility] Critical Care Note:: 7/4/2024  Juany Foy  Admission Date: 6/17/2024     Length of Stay: 17 days    Background:  89 y.o female with hx of HTN, HLD, a-fib on eliquis, HFpEF who presented to ER on 6/17 with dizziness and suspected fall. She is unsure if she lost consciousness. In ER, HR was 200 treated with adenosine and cardizem, showing a-fib RVR. Was also noted to have AG metabolic acidosis and acute renal insufficiency, elevated transaminases, right pleural effusion, and very small bilateral PE. She was started on heparin drip. We were consulted for dyspnea and hypoxia. She reports she has felt poorly for 5 days and was not taking eliquis at that time. She was admitted to CCU initially due to deteriorating respiratory status. ProBNP on admission >28K. Had ERCP 6/20 for CBD stones, stent placed- GI recommended to repeat ERCP in 6-8 weeks. Patient had MAGUI 6/25 with evidence of clot, therefore cardioversion was aborted.  EP consulted for potential/consideration of pacemaker/ablation    Notable PMH:  has no past medical history on file.    24 Hour events: Remains minimally responsive on BIPAP. +300net. On amio gtt, rate in 90s. Cr worse. Granddaughter at bedside and thinks she's been less responsive than yesterday starting late last night. No sedation.     Review of Systems: Comprehensive ROS negative except in HPI     Lines: (insertion date)    Urinary Catheter 06/22/24 (Active)      Drips: current dose (range)  Dose (mcg/kg/min) Propofol : *20 mg  Dose (units/hr) Heparin: 0 Units/hr  Dose (mg/min) Amiodarone: 1 mg/min     Pertinent Exam:         Blood pressure 101/60, pulse 88, temperature 98.2 °F (36.8 °C), temperature source Axillary, resp. rate 25, height 1.524 m (5'), weight 63.7 kg (140 lb 6.9 oz), SpO2 92 %.   Intake/Output Summary (Last 24 hours) at 7/4/2024 0921  Last data filed at 7/4/2024 0735  Gross per 24 hour   Intake 1150.55 ml   Output 445 ml   Net 705.55 ml 
 attempted to visit patient.  Patient appeared to be sleeping comfortably at time.   provided prayer and is available to follow up.  Peace be with you,  Signed by  JAME FlynnDiv.   [phone]  
 completed follow up visit.  Patient appeared lethargic, and participated in conversation briefly, stating she is trying to get better.   provided pastoral presence, prayer and empathetic listening.  Patient stated appreciation for the prayer.   will continue to follow.  Peace be with you,  Signed by  JAME FlynnDiv.   [phone]  
 completed follow up visit.  Patient appeared to sleep soundly at time, but appeared comfortable.  Daughter and son-in-law were at bedside and supportive.  Family engaged in life review and shared their hopes and concerns.   provided pastoral presence, prayer and empathetic listening.  Peace be with you,  Signed by  JAME FlynnDiv.   [phone]  
 completed initial visit with patient and family.  Daughter and son-in-law were at bedside and supportive.  Patient was not able to respond at the time.  Family engaged in life review and shared their hopes and concerns.   provided pastoral presence, prayer and empathetic listening.  Peace be with you,  Signed by  JAME FlynnDiv.   [phone]  
0130: Patient in A fib 130s. Bianka Mcgill NP notified. Orders received for Metoprolol 5 mg IV.  
4 Eyes Skin Assessment     NAME:  Juany Foy  YOB: 1935  MEDICAL RECORD NUMBER:  [id number]    The patient is being assessed for  Admission    I agree that at least one RN has performed a thorough Head to Toe Skin Assessment on the patient. ALL assessment sites listed below have been assessed.      Areas assessed by both nurses:    Head, Face, Ears, Shoulders, Back, Chest, Arms, Elbows, Hands, Sacrum. Buttock, Coccyx, Ischium, and Legs. Feet and Heels        Does the Patient have a Wound? No noted wound(s)    Scattered bruising, excoriation/rash to breast, abd, groin, ervin area, tear to left cheek and right hip       Waldemar Prevention initiated by RN: Yes  Wound Care Orders initiated by RN: No    Pressure Injury (Stage 3,4, Unstageable, DTI, NWPT, and Complex wounds) if present, place Wound referral order by RN under : No    New Ostomies, if present place, Ostomy referral order under : No     Nurse 1 eSignature: Electronically signed by Marry Livingston RN on 6/18/24 at 3:21 AM EDT    **SHARE this note so that the co-signing nurse can place an eSignature**    Nurse 2 eSignature: Electronically signed by Sima Sheehan RN on 6/18/24 at 4:26 AM EDT   
4 Eyes Skin Assessment     NAME:  Juany Foy  YOB: 1935  MEDICAL RECORD NUMBER:  [id number]    The patient is being assessed for  Transfer to New Unit    I agree that at least one RN has performed a thorough Head to Toe Skin Assessment on the patient. ALL assessment sites listed below have been assessed.      Areas assessed by both nurses:    Head, Face, Ears, Shoulders, Back, Chest, Arms, Elbows, Hands, Sacrum. Buttock, Coccyx, Ischium, and Legs. Feet and Heels     Patient's skin is clean, dry, and intact. Heels and sacrum without any injury or redness. Patient has scattered bruising and scars; excoriation to groins/abd/breasts. L cheek and R hip skin tear noted.        Does the Patient have a Wound? No noted wound(s)       Waldemar Prevention initiated by RN: Yes  Wound Care Orders initiated by RN: No    Pressure Injury (Stage 3,4, Unstageable, DTI, NWPT, and Complex wounds) if present, place Wound referral order by RN under : No    New Ostomies, if present place, Ostomy referral order under : No     Nurse 1 eSignature: Electronically signed by Noemi Torre RN on 6/19/24 at 1:22 AM EDT    **SHARE this note so that the co-signing nurse can place an eSignature**    Nurse 2 eSignature: Electronically signed by Tori Gallo RN on 6/19/24 at 4:31 AM EDT    
ACUTE OCCUPATIONAL THERAPY GOALS:   (Developed with and agreed upon by patient and/or caregiver.)  1. Pt will complete LB ADL I with AE as needed.   2. Pt will complete toileting I with AE as needed.   3. Pt will complete UB ADL I.  4. Pt will tolerate 25 minutes of OT treatment requiring 1-2 breaks as needed.   5. Pt will complete grooming tasks while standing at sink I.  6. Pt will complete functional mobility via LRAD Mod I.   7. Pt will tolerate BUE exercises to increase strength for safe, functional transfers and/or functional mobility, and ADL participation.      Timeframe: 7 days      OCCUPATIONAL THERAPY: Daily Note AM   OT Visit Days: 2   Time In/Out  OT Charge Capture  Rehab Caseload Tracker  OT Orders    Juany Foy is a 89 y.o. female   PRIMARY DIAGNOSIS: Atrial fibrillation with rapid ventricular response (HCC)  Lactic acidosis [E87.20]  Near syncope [R55]  Atrial fibrillation with rapid ventricular response (HCC) [I48.91]  Multiple subsegmental pulmonary emboli without acute cor pulmonale (HCC) [I26.94]  Procedure(s) (LRB):  CHEST ULTRASOUND (Bilateral)  6 Days Post-Op  Inpatient: Payor: [employer] MEDICARE / Plan: [employer] GOLD PLUS HMO / Product Type: *No Product type* /     ASSESSMENT:     REHAB RECOMMENDATIONS:   Recommendation to date pending progress:  Setting:  Short-term Rehab    Equipment:    To Be Determined     ASSESSMENT:  Ms. Foy continues to present with overall deficits in strength, functional mobility, activity tolerance, and ADLs. Received supine in bed, HR in the 110s, and resting on 5L O2. Today, BM in bed prior to therapist arrival. Total A for pericare supine in bed and Max A for donning brief supine in bed. Sup > sit Mod A x 2 and once upright required Min A to sustain upright seated positioning d/t R postural lean and postural sway. Grooming activities seated EOB with Min  A again to maintain upright seated positioning. Sit > stand with Mod A x 2 x RW with poor standing 
ACUTE OCCUPATIONAL THERAPY GOALS:   (Developed with and agreed upon by patient and/or caregiver.)  1. Pt will complete LB ADL I with AE as needed.   2. Pt will complete toileting I with AE as needed.   3. Pt will complete UB ADL I.  4. Pt will tolerate 25 minutes of OT treatment requiring 1-2 breaks as needed.   5. Pt will complete grooming tasks while standing at sink I.  6. Pt will complete functional mobility via LRAD Mod I.   7. Pt will tolerate BUE exercises to increase strength for safe, functional transfers and/or functional mobility, and ADL participation.     Timeframe: 7 days      OCCUPATIONAL THERAPY Initial Assessment, Daily Note, and AM       OT Visit Days: 1  Acknowledge Orders  Time  OT Charge Capture  Rehab Caseload Tracker      Juany Foy is a 89 y.o. female   PRIMARY DIAGNOSIS: Atrial fibrillation with rapid ventricular response (HCC)  Lactic acidosis [E87.20]  Near syncope [R55]  Atrial fibrillation with rapid ventricular response (HCC) [I48.91]  Multiple subsegmental pulmonary emboli without acute cor pulmonale (HCC) [I26.94]  Procedure(s) (LRB):  ESOPHAGOGASTRODUODENOSCOPY ULTRASOUND; make sure heparin drip held at midnight (N/A)     Reason for Referral: Generalized Muscle Weakness (M62.81)  Inpatient: Payor: [employer] MEDICARE / Plan: [employer] GOLD PLUS HMO / Product Type: *No Product type* /     ASSESSMENT:     REHAB RECOMMENDATIONS:   Recommendation to date pending progress:  Setting:  Short-term Rehab    Equipment:    To Be Determined     ASSESSMENT:  Ms. Foy is a 89 y F with a hx of HTN, HLD, diastolic CHF. Pt having possible cardioversion today. Pt was admitted for afib with RVR. Pt was received supine in bed. Pt required assistance for functional mobility and ADLs today due to fatigue with exertion, unsteadiness, generalized weakness. HR reached 90-120s throughout session, which included walking bed to chair, sit to stand, full bath, gown change. Pt has deficits in strength, 
ACUTE OCCUPATIONAL THERAPY GOALS:   (Developed with and agreed upon by patient and/or caregiver.)  GOALS UPDATED TO REFLECT RE-EVAL 7/2/2024  1. Pt will complete LB ADL CGA with AE as needed.   2. Pt will complete toileting CGA with AE as needed.   3. Pt will complete UB ADL SBA.  4. Pt will tolerate 25 minutes of OT treatment requiring 3-4 breaks as needed.   5. Pt will complete grooming tasks while sitting EOB with CGA.  6. Pt will complete functional mobility via LRAD CGA.   7. Pt will tolerate BUE exercises to increase strength for safe, functional transfers and/or functional mobility, and ADL participation.     Timeframe: 7 days      OCCUPATIONAL THERAPY Daily Note, Re-evaluation, and AM       OT Visit Days: 1  Acknowledge Orders  Time  OT Charge Capture  Rehab Caseload Tracker      Juany Foy is a 89 y.o. female   PRIMARY DIAGNOSIS: Atrial fibrillation with rapid ventricular response (HCC)  Lactic acidosis [E87.20]  Near syncope [R55]  Atrial fibrillation with rapid ventricular response (HCC) [I48.91]  Multiple subsegmental pulmonary emboli without acute cor pulmonale (HCC) [I26.94]  Procedure(s) (LRB):  THORACENTESIS ULTRASOUND (Bilateral)  Day of Surgery  Reason for Referral: Generalized Muscle Weakness (M62.81)  Inpatient: Payor: [employer] MEDICARE / Plan: [employer] GOLD PLUS HMO / Product Type: *No Product type* /     ASSESSMENT:     REHAB RECOMMENDATIONS:   Recommendation to date pending progress:  Setting:  Short-term Rehab    Equipment:    To Be Determined     ASSESSMENT:  Ms. Foy is a 89 y F with a hx of HTN, HLD, diastolic CHF. Pt was received supine in bed, lethargic, and resting on 60L/74% AirVo. Pt required max assistance for bed mobility and ADLs today due to fatigue with exertion, unsteadiness, and generalized weakness. Once sitting upright, had significant L postural lean and initially required Max A to sustain sitting balance but progressed to CGA at times.  Attempted sit <> stand but 
ACUTE PHYSICAL THERAPY GOALS:   (Developed with and agreed upon by patient and/or caregiver.)  LTG:  (1.)Ms. Foy will move from supine to sit and sit to supine , scoot up and down, and roll side to side in bed with MINIMAL ASSIST within 7 treatment day(s).    (2.)Ms. Foy will transfer from bed to chair and chair to bed with MINIMAL ASSIST using the least restrictive device within 7 treatment day(s).    (3.)Ms. Foy will ambulate with MODERATE ASSIST for 50 feet with the least restrictive device within 7 treatment day(s).  (4.)Ms. Foy will participate in therapeutic activity/exercises x 25 minutes for increased activity tolerance with SpO2 above 90% within 7 treatment days.  (5.)Ms. Foy will maintain static/dynamic sitting x 15 minutes with INDEPENDENCE for improved balance within 7 treatment days.    ________________________________________________________________________________________________      PHYSICAL THERAPY Re-evaluation and AM  (Link to Caseload Tracking: PT Visit Days : 1  Acknowledge Orders  Time In/Out  PT Charge Capture  Rehab Caseload Tracker    FALL RISK    Juany Foy is a 89 y.o. female   PRIMARY DIAGNOSIS: Atrial fibrillation with rapid ventricular response (HCC)  Lactic acidosis [E87.20]  Near syncope [R55]  Atrial fibrillation with rapid ventricular response (HCC) [I48.91]  Multiple subsegmental pulmonary emboli without acute cor pulmonale (HCC) [I26.94]  Procedure(s) (LRB):  THORACENTESIS ULTRASOUND (Bilateral)  Day of Surgery  Reason for Referral: Generalized Muscle Weakness (M62.81)  Difficulty in walking, Not elsewhere classified (R26.2)  Inpatient: Payor: [employer] MEDICARE / Plan: [employer] GOLD PLUS HMO / Product Type: *No Product type* /     ASSESSMENT:     REHAB RECOMMENDATIONS:   Recommendation to date pending progress:  Setting:  Short-term Rehab    Equipment:    To Be Determined     ASSESSMENT:  Ms. Foy Is a 89 y.o. female presenting to PT after being 
ACUTE PHYSICAL THERAPY GOALS:   (Developed with and agreed upon by patient and/or caregiver.)  Pt will perform bed mobility c Min (A), inc time, cueing and use of rails as needed in 7 therapy sessions.  Pt will perform sit-to-stand/ stand-to-sit transfers Min (A) c use of LRAD/external supports as needed and no LOB or miss-steps in 7 therapy sessions.  Pt will ambulate 175 ft CG(A) with use of LRAD, no LOB or miss-steps and breaks as needed in 7 therapy sessions.  Pt will perform standing dynamic balance activities with minimal postural sway in 7 therapy sessions.  Pt will tolerate multiple sets and reps of BLE exercises in 7 therapy sessions.      PHYSICAL THERAPY Initial Assessment, Daily Note, and AM  (Link to Caseload Tracking: PT Visit Days : 1  Acknowledge Orders  Time In/Out  PT Charge Capture  Rehab Caseload Tracker    FALL RISK    Juany Foy is a 89 y.o. female   PRIMARY DIAGNOSIS: Atrial fibrillation with rapid ventricular response (HCC)  Lactic acidosis [E87.20]  Near syncope [R55]  Atrial fibrillation with rapid ventricular response (HCC) [I48.91]  Multiple subsegmental pulmonary emboli without acute cor pulmonale (HCC) [I26.94]  Procedure(s) (LRB):  ESOPHAGOGASTRODUODENOSCOPY ULTRASOUND; make sure heparin drip held at midnight (N/A)     Reason for Referral: Generalized Muscle Weakness (M62.81)  Difficulty in walking, Not elsewhere classified (R26.2)  Inpatient: Payor: HU[employer]NA MEDICARE / Plan: HU[employer]NA GOLD PLUS HMO / Product Type: *No Product type* /     ASSESSMENT:     REHAB RECOMMENDATIONS:   Recommendation to date pending progress:  Setting:  Short-term Rehab    Equipment:    To Be Determined     ASSESSMENT:  Ms. Foy Is a 89 y.o. female presenting to PT after being admitted on 6/17/24 for A fib RvR associated with near syncopal episode. In ED, pt also noted to have AG metabolic acidosis and acute renal insufficiency, elevated transaminases, right pleural effusion, and bilateral PE. She was 
ACUTE PHYSICAL THERAPY GOALS:   (Developed with and agreed upon by patient and/or caregiver.)  Pt will perform bed mobility c Min (A), inc time, cueing and use of rails as needed in 7 therapy sessions.  Pt will perform sit-to-stand/ stand-to-sit transfers Min (A) c use of LRAD/external supports as needed and no LOB or miss-steps in 7 therapy sessions.  Pt will ambulate 175 ft CG(A) with use of LRAD, no LOB or miss-steps and breaks as needed in 7 therapy sessions.  Pt will perform standing dynamic balance activities with minimal postural sway in 7 therapy sessions.  Pt will tolerate multiple sets and reps of BLE exercises in 7 therapy sessions.    PHYSICAL THERAPY: Daily Note AM   (Link to Caseload Tracking: PT Visit Days : 2  Time In/Out PT Charge Capture  Rehab Caseload Tracker  Orders    FALL RISK    Juany Foy is a 89 y.o. female   PRIMARY DIAGNOSIS: Atrial fibrillation with rapid ventricular response (HCC)  Lactic acidosis [E87.20]  Near syncope [R55]  Atrial fibrillation with rapid ventricular response (HCC) [I48.91]  Multiple subsegmental pulmonary emboli without acute cor pulmonale (HCC) [I26.94]  Procedure(s) (LRB):  CHEST ULTRASOUND (Bilateral)  6 Days Post-Op  Inpatient: Payor: HUMANA MEDICARE / Plan: HUMANA GOLD PLUS HMO / Product Type: *No Product type* /     ASSESSMENT:     REHAB RECOMMENDATIONS:   Recommendation to date pending progress:  Setting:  Short-term Rehab    Equipment:    To Be Determined     ASSESSMENT:  Ms. Foy was supine in bed on 5L NC upon entering the room and agreeable to therapy. HR more controlled today but remains elevated throughout session; generally ~90s-100 at rest and up to 139 at highest observed during mobility efforts. Today, pt demonstrates regression since last seen by therapy as she has not been appropriate to participate and shows expected decreased activity tolerance associated with prolonged immobility. This session, pt required Mod (A)x1-2, inc time and 
ACUTE PHYSICAL THERAPY GOALS:   (Developed with and agreed upon by patient and/or caregiver.)  Pt will perform bed mobility c Min (A), inc time, cueing and use of rails as needed in 7 therapy sessions.  Pt will perform sit-to-stand/ stand-to-sit transfers Min (A) c use of LRAD/external supports as needed and no LOB or miss-steps in 7 therapy sessions.  Pt will ambulate 175 ft CG(A) with use of LRAD, no LOB or miss-steps and breaks as needed in 7 therapy sessions.  Pt will perform standing dynamic balance activities with minimal postural sway in 7 therapy sessions.  Pt will tolerate multiple sets and reps of BLE exercises in 7 therapy sessions.    PHYSICAL THERAPY: Daily Note AM   (Link to Caseload Tracking: PT Visit Days : 3  Time In/Out PT Charge Capture  Rehab Caseload Tracker  Orders    FALL RISK    Juany Foy is a 89 y.o. female   PRIMARY DIAGNOSIS: Atrial fibrillation with rapid ventricular response (HCC)  Lactic acidosis [E87.20]  Near syncope [R55]  Atrial fibrillation with rapid ventricular response (HCC) [I48.91]  Multiple subsegmental pulmonary emboli without acute cor pulmonale (HCC) [I26.94]  Procedure(s) (LRB):  CHEST ULTRASOUND (Bilateral)  7 Days Post-Op  Inpatient: Payor: [employer] MEDICARE / Plan: HUMANA GOLD PLUS HMO / Product Type: *No Product type* /     ASSESSMENT:     REHAB RECOMMENDATIONS:   Recommendation to date pending progress:  Setting:  Short-term Rehab    Equipment:    To Be Determined     ASSESSMENT:  Ms. Foy was supine in bed on 3L NC upon entering the room and agreeable to therapy. HR varied in 80s to 120s throughout session. Unable to determine if pt is having delayed processing or is [place], which she denies. She is slow to follow commands and often asking for question to be repeated. This session, pt required Mod A x 2, increased time and cueing for all mobility while requiring additional use of RW for very brief 4-5 steps from EOB to recliner. Pt is easily fatigued and 
Bedside and verbal shift change report received from  Katerin NAJERA (offgoing nurse). Report included the following information SBAR, Kardex, Intake/Output, MAR, Recent Results, Med Rec Status, Cardiac Rhythm Afib, and Alarm Parameters .                                          
Bilateral Ultra Sound done of the chest, pictures taken and placed on chart. Dr Mccall at bedside and there is not a clear window to place catheter to drain a small effusion.  
Bladder scan done on pt by this RN, noted 678 mL in bladder. Angeline Barton MD notified. Received verbal orders to straight cath if patient unable to void after getting up to BSC. Patient voided 250. Post-void bladder scan showed a volume of 487. Dr. Barton notified, does not want to straight cath patient at this time, no further orders.   
CH responded to a called requesting spiritual support for pt/family. CH reviewed the pt's chart. Pt moved to comfort care. Upon arrival, CH checked with the nurse about the pt. The pt is from a Denominational bhanu background, Jain. Pt's daughter with tears  shared about her love for her mother. The pt's son in law engaged in life review. The  offered words of comfort and encouragement and prayed for them. Ch provided spiritual care and emotional support through pastoral presence, non-anxious presence, active listening, meaningful conversation, and prayer. CH is available as needed.   
Called to bedside to assess need for increased FIO2.  Received patient in SF without s/s of acute discomfort, however, SATS 88%.  Also, L nare occluded with dried hard blood secretions; prongs of HHFNC cleaned as well as both nares and placed lubricating jelly in each nare.  As patient in afib with increased HR, O2 increased to 85% with SAT monitor increasing to 93-94%. Primary RN at bedside.  Education provided to patient on purse-lipped breathing and importance.  Patient able to convey \"nose feels better\".  RN to call should more invention needed.   06/30/24 0715   Oxygen Therapy/Pulse Ox   O2 Therapy Oxygen humidified   $Oxygen $Daily Charge   O2 Device Heated high flow cannula   O2 Flow Rate (L/min) 50 L/min   FiO2  (S)  85 %   Respirations 21   Skin Assessment Clean, dry, & intact   Skin Protection for O2 Device N/A   Pulse Oximeter Device Mode Continuous   $Pulse Oximeter $Spot check (multiple/continuous)       
Comfort measures initiated per family request  
Comprehensive Nutrition Assessment    Type and Reason for Visit: Initial, Positive Nutrition Screen  Length of Stay    Nutrition Recommendations/Plan:   Meals and Snacks:  Diet: Continue current order  Nutrition Supplement Therapy:  Medical food supplement therapy:  Initiate Ensure Enlive three times per day (this provides 350 kcal and 20 grams protein per bottle)     Malnutrition Assessment:  Malnutrition Status: At risk for malnutrition (Comment) (decreasing oral intake)  Declined NFPE d/t feeling poorly, comfortable reclined on side     Nutrition Assessment:  Nutrition History: Eats 2 meals daily at baseline, family note she generally enjoys eating but has not eaten much throughout admission      Do You Have Any Cultural, Orthodox, or Ethnic Food Preferences?: No   Weight History:   Wt hx per EMR review per cardiology office  Wt Readings from Last 3 Encounters:   02/02/24 64.2 kg (141 lb 9.6 oz)   07/24/23 59.9 kg (132 lb)   09/15/21 62.6 kg (138 lb)     Nutrition Background:       PMH remarkable for HTN, afib, hypothyroidism, admitted with near syncope, JOE, hypernatremia, sepsis, elevated LFTs, acute hypoxia.    MAGUI 6/25 TRACE thrombus.  Nutrition Interval:  Pt reclined in bed, family at bedside.  She c/o poor appetite, fatigue, feeling poorly.  Consuming <25% of meals.  Denies any difficulties chewing or swallowing.  Reports hx of use of ensure/boost.      Current Nutrition Therapies:  ADULT DIET; Easy to Chew    Current Intake:   Average Meal Intake: 1-25% Average Supplements Intake: None Ordered      Anthropometric Measures:  Height: 152.4 cm (5')  Current Body Wt: 67 kg (147 lb 11.3 oz) (6/18), Weight source: Not Specified  BMI: 28.8, Overweight (BMI 25.0-29.9)  Admission Body Weight: 76.7 kg (169 lb) (stated)  Ideal Body Weight (Kg) (Calculated): 45 kg (100 lbs), 147.7 %  BMI Category Overweight (BMI 25.0-29.9)  Estimated Daily Nutrient Needs:  Energy (kcal/day): 8902-0704 (20-25 kcal/kg) (Kcal/kg Weight 
Dr. Meek at bedside during this RT's assessment.  Advised to maintain flow at 50L wean FIO2 as tolerated.  Currently patient maintaining SATS 94% or greater on 50L 60%       06/30/24 1030   Oxygen Therapy/Pulse Ox   O2 Therapy Oxygen humidified   O2 Device Heated high flow cannula   O2 Flow Rate (L/min) 50 L/min   FiO2  (S)  60 %   Pulse (!) 136   Respirations 17   SpO2 97 %   Skin Assessment Clean, dry, & intact   Pulse Oximeter Device Mode Continuous       
Family (son Luigi) updated at 9:44 PM by Radha Adler RN.    
Handoff Report given to DANIA Andino. RN resuming care of patient   
Juany Foy is 89 y.o. y/o female     Initial consult: See consult note from 6/18: Patient admitted for PE, AFIB RVR, metabolic acidosis, pleural effusions and requiring Airvo and in ICU. We were consulted for elevated TB and LFT; no gallbladder. Noted to have cyst on liver; no bile duct dilation. Initially planning to monitor LFT and MRCP if worsening. Currently on a heparin drip for PE/AFIB RVR.     Today: Patient moved to medical floor. Patient on 3L NC at bedside; no complaints. Discussed possible MRCP if labs worsens; states that she cannot get MRI as she had clips placed in her stomach in the 1950s.     Labs: T.bili 1.6 (3),  (319),  (306),  (190), Negative viral hepatitis panel.    PE:   Vitals:    06/19/24 0806   BP: 101/81   Pulse: 89   Resp:    Temp:    SpO2:       General:  The patient appears well-nourished, and is in no acute distress.    HEENT:  Normocephalic, atraumatic. No sclerae icterus.   Respiratory: On 3L NC. Respiratory effort is normal. Expansion maintained bilaterally and symmetrically.   Neurologic:  Alert and oriented x3.  Psychiatric: Appropriate mood and affect.    Assessment and Plan:   #Elevated LFT/Bilirubin: Labs are improving today and respiratory function also improving. Discussed case with Dr. Lopes; will plan for EUS tomorrow since patient cannot get an MRI. Will notify anesthesia for approval.     Will need to hold heparin drip at [facility] in anticipation for procedure. Will make Npo at [facility].     Electronically signed by Nena Dsouza PA-C.    
Liaison met briefly with daughter.  Daughter stated that they are not interested in hospice services at this point.  She said that she would call liaison should pt condition change.  Updated NEYDA Bledsoe of family's intentions.    
MAGUI completed  Patient numbed at 1440  Sedation start 1444  Sedation end 1454  MAGUI with evidence of clot, cardioversion aborted.   Will continue to monitor patient until ready for return to floor.  
Ms. Foy is now comfort care.  DC PT.  RAÚL DUPREE, PT    
[facility], [address], [address]  [place] [address]  PHONE: [phone]    HPI for Admision: Key findings are: 89-year-old female with history of atrial fibrillation on chronic Eliquis therapy but has discontinued medication recently due to feeling poorly, chronic hypertension and dyslipidemia who presents to the emergency after a fall at home.  It is unclear whether patient fell or lost consciousness.  Upon presentation she was noted to be severely tachycardic with rates greater than 200 bpm.  She was given IV adenosine with no improvement in 45 mg of IV diltiazem with development of A-fib with RVR.  On arrival here she remained tachycardic and tachypneic.  EKG confirmed A-fib with RVR.  CT of the chest demonstrates pulmonary emboli.  Patient also has elevated LFTs of uncertain etiology.  Current review of echocardiogram demonstrates biventricular heart failure uncertain whether this is true biventricular heart failure or whether RV dysfunction is related to pulmonary emboli.       SUBJECTIVE:       24 Hr Updates:  Pt placed on IV amio at 1 mg/min yesterday due to Tachy Gus with IV Cardizem and Toprol XL.  HR would go into the 140 without cardizem and had pauses with.  HR now  A Fib.  On heparin drip now for thoracentesis on tues.  Now of BiPAP on Airvo.  Pt feels breathing status has improved significantly.        Hospital Problems             Last Modified POA    * (Principal) Atrial fibrillation with rapid ventricular response (HCC) 6/18/2024 Yes    Pulmonary embolism (HCC) 6/18/2024 Yes    GOULD (nonalcoholic steatohepatitis) 6/26/2024 Yes    Paroxysmal atrial fibrillation (HCC) (Chronic) 6/18/2024 Yes    Myocardiopathy (HCC) (Chronic) 6/26/2024 Yes    Hyperlipidemia (Chronic) 6/26/2024 Yes    Grade II diastolic dysfunction (Chronic) 6/26/2024 Yes    Hypertension (Chronic) 6/26/2024 Yes    AAA (abdominal aortic aneurysm) (HCC) (Chronic) 6/26/2024 Yes    Bilateral pleural effusion 
Nutrition Note     Noted pt now on comfort care measures only, diet order and nutrition supplements to continue for pt pleasure. If goals of care change and nutrition intervention desired, please consult Nutrition Services.     FLAVIO ESTRADA, RD      
Occupational Therapy Note:    Attempted to see patient this AM for occupational therapy evaluation session. Patient currently a HOLD for respiratory status. Will follow and re-attempt as schedule permits/patient available. Thank you,    Alice Blackmon, OT    Rehab Caseload Tracker        
Occupational Therapy Note:    Attempted to see patient this AM for occupational therapy treatment  session.  Hold per RN as pt continues to have elevated HR at rest and is scheduled to have MAGUI guided CVN later today per Cardiology. Will follow and re-attempt as schedule permits/patient available.     Thank you,    MARILEE LEWIS, OT    Rehab Caseload Tracker      
Occupational Therapy Note:    Attempted to see patient this AM for occupational therapy treatment  session. Hold per RN. Pt HR 140s at rest. Will follow and re-attempt as schedule permits/patient available.       Thank you,    MARILEE LEWIS, OT    Rehab Caseload Tracker      
Occupational Therapy Note:    Attempted to see patient this AM for occupational therapy treatment  session. Patient currently a HOLD for BIPAP. Will follow and re-attempt as schedule permits/patient available. Thank you,    Alice Blackmon, OT    Rehab Caseload Tracker        
Occupational Therapy Note:    Attempted to see patient this AM for occupational therapy treatment  session. Patient resting on BIPAP. Will follow and re-attempt as schedule permits/patient available. Thank you,    Alice Blackmon, OT    Rehab Caseload Tracker        
Occupational Therapy Note:    Attempted to see patient this PM for occupational therapy treatment  session. Patient still with uncontrolled afib therefore hold per nursing. Will follow and re-attempt as schedule permits/patient available.     Thank you,    MARILEE LEWIS, OT    Rehab Caseload Tracker      
Occupational Therapy Note:    Therapy will d/c from caseload at this time as comfort measures have been placed. Thank you,     YRIS Lange, OT  
PT was in bed and appeared to be resting. PT is on comfort measures. Family was at bedside. CH introduced self. CH offered comforting spiritual presence. CH talked gently to PT. PT is Mormonism. Family expressed affection for PT. Family expressed trust in Kranthi and comfort in knowing PT will be in heaven with PT's Spouse. CH offered prayer . CH checked for unmet needs and offered support.     . Merle Cano M.Div.    
Patient is s/p ERCP for stone removal/stent placement on 6/21. She was not in room during evaluation.     Labs: TB WNL,  (257),  (135),  (193), WBC 9.7.    Will have our office call patient for repeat in 6-8x weeks. Okay to restart blood thinners from GI standpoint. If no post procedural pain; okay for discharge from GI standpoint once other issues have improved. We will sign off at this time, please reconsult as needed.     TIFFANI Burroughs    
Patient's PTT timed for 0330 this morning for Heparin gtt.  did not begin drawing labs on patient until 0445.  then informs this RN that the Heparin gtt needs to be paused/delayed for 15 mins before she can draw the PTT due drawing labs from the same arm that the Heparin is infusing in. Heparin gtt paused. Will continue protocol once lab is drawn.     0507 PTT drawn.   
Physical Therapy Note:    Attempted to see patient this AM for Physical therapy treatment  session. Hold per RN as pt continues to have elevated HR at rest and is scheduled to have MAGUI guided CVN later today per Cardiology. Will follow and re-attempt as schedule permits/patient available.       Thank you,    Blu Sunshine, PT    Rehab Caseload Tracker      
Physical Therapy Note:    Attempted to see patient this AM for occupational therapy treatment  session. Hold per RN. Pt HR 140s at rest. Will follow and re-attempt as schedule permits/patient available.       Thank you,    Blu Sunshine, PT    Rehab Caseload Tracker      
Physical Therapy Note:    Attempted to see patient this AM for physical therapy treatment  session. Patient a hold for BIPAP today. Will follow and re-attempt as schedule permits/patient available. Thank you,    Katie Delatorre, PT     Rehab Caseload Tracker    
Physical Therapy Note:    Attempted to see patient this PM for Physical therapy treatment  session. Patient still with uncontrolled afib therefore hold per nursing. Will follow and re-attempt as schedule permits/patient available.     Thank you,    Blu Sunshine, PT    Rehab Caseload Tracker      
Physical Therapy Note:    Therapist is discontinuing acute PT services secondary to transfer to the ICU. Please re-consult acute PT services when medically appropriate. Thank you,    Mary Billy, PT, DPT      
Pt had medium dark, tarry stool. Occult stool sample sent down and resulted positive. MD Henderson notified with orders to continue eliquis unless there is severe bleeding noted.   
Pt having episodes of bradycardia in the 40s this afternoon. Bp 90s/60s. Parameters placed to hold scheduled Cardizem for HR <50 and SBP <90 per TIFFANI Caicedo.     Pt had a 2.3 second pause at 1658 while sleeping. Pt awakes to voice and denies any symptoms. Dipika Lang notified and scheduled Cardizem discontinued per PA.   
Pt remains in afib rvr 120-150 with cardizem gtt running at 10. Discussed with Dr. Meek and Dr. Mcgovern and amio bolus+gtt ordered with gtt to stay at 1.   
Pt son, Luigi, updated on patient situation.   
Pt transferred from ICU to the floor.  Comfort Care Only at this time.  
Spoke with daughter, who provided security code. Answered all questions and updated her on patient.   
Straight cath output 415. Clear, hazy, no odor. Recheck due at 2240.  
TRANSFER - IN REPORT:    Verbal report received from Brianne NAJERA on Juany Foy  being received from ED for change in patient condition (HR)      Report consisted of patient's Situation, Background, Assessment and   Recommendations(SBAR).     Information from the following report(s) Nurse Handoff Report, Intake/Output, MAR, Recent Results, Med Rec Status, Cardiac Rhythm afib RVR, and Alarm Parameters was reviewed with the receiving nurse.    Opportunity for questions and clarification was provided.      Assessment completed upon patient's arrival to unit and care assumed.    
TRANSFER - IN REPORT:    Verbal report received from CONSTANTINE NAJERA on Juany Foy  being received from 423 for change in patient condition (NEED FOR AMIO / POSS BIPAP)      Report consisted of patient's Situation, Background, Assessment and   Recommendations(SBAR).     Information from the following report(s) Nurse Handoff Report, Index, ED Encounter Summary, ED SBAR, Adult Overview, Intake/Output, MAR, Recent Results, Med Rec Status, Cardiac Rhythm AFIB, Alarm Parameters, Procedure Verification, and Quality Measures was reviewed with the receiving nurse.    Opportunity for questions and clarification was provided.      Assessment completed upon patient's arrival to unit and care assumed.     
TRANSFER - IN REPORT:    Verbal report received from DANIA Tuttle on Juany Foy  being received from CCU for routine progression of patient care      Report consisted of patient's Situation, Background, Assessment and   Recommendations(SBAR).     Information from the following report(s) Nurse Handoff Report, Index, MAR, Recent Results, and Cardiac Rhythm A. Fib  was reviewed with the receiving nurse.    Opportunity for questions and clarification was provided.      Assessment completed upon patient's arrival to unit and care assumed.     Patient arrived to room 423. Vital signs obtained, telemetry monitor verified, and admission assessment completed. Patient oriented to call light and instructed to call for assistance. Bed low and locked, side rails x2, and bed alarm on.   
TRANSFER - IN REPORT:    Verbal report received from Wellington NAJERA on Juany Foy being received from cath lab for routine progression of care.     Report consisted of patient’s Situation, Background, Assessment and Recommendations(SBAR).     Information from the following report(s) Procedure Summary and Cardiac Rhythm afib  was reviewed. Opportunity for questions and clarification was provided.      Assessment completed upon patient’s arrival to unit and care assumed.     Patient received to room 423. Patient connected to monitor and assessment completed. Plan of care reviewed. Patient oriented to room and call light. Patient aware to use call light to communicate any chest pain or needs.       
TRANSFER - IN REPORT:    Verbal report received from fortino macario   on Juany Foy  being received from [facility] for routine progression of patient care      Report consisted of patient's Situation, Background, Assessment and   Recommendations(SBAR).     Information from the following report(s) Nurse Handoff Report was reviewed with the receiving nurse.    Opportunity for questions and clarification was provided.      Assessment completed upon patient's arrival to unit and care assumed.      Hep drip not stopped until 0200 dr pepper notified  still ok to do procedure.  
TRANSFER - OUT REPORT:    Verbal report given to DANIA Saeur on Juany Foy  being transferred to [facility] for routine progression of patient care       Report consisted of patient's Situation, Background, Assessment and   Recommendations(SBAR).     Information from the following report(s) Nurse Handoff Report, MAR, and Cardiac Rhythm a fib  was reviewed with the receiving nurse.           Lines:   Peripheral IV 06/30/24 Right;Proximal Forearm (Active)   Site Assessment Clean, dry & intact 07/04/24 0700   Line Status Infusing 07/04/24 0700   Line Care Connections checked and tightened 07/04/24 0700   Phlebitis Assessment No symptoms 07/04/24 0700   Infiltration Assessment 0 07/04/24 0700   Alcohol Cap Used Yes 07/04/24 0700   Dressing Status Clean, dry & intact 07/04/24 0700   Dressing Type Transparent 07/04/24 0700   Dressing Intervention New 06/30/24 0715       Peripheral IV 06/30/24 Left;Proximal Forearm (Active)   Site Assessment Clean, dry & intact 07/04/24 0700   Line Status Flushed;Capped 07/04/24 0700   Line Care Connections checked and tightened 07/04/24 0700   Phlebitis Assessment No symptoms 07/04/24 0700   Infiltration Assessment 0 07/04/24 0700   Alcohol Cap Used Yes 07/04/24 0700   Dressing Status Clean, dry & intact 07/04/24 0700   Dressing Type Transparent 07/04/24 0700   Dressing Intervention New 06/30/24 0715        Opportunity for questions and clarification was provided.      Patient transported with:  Registered Nurse        
TRANSFER - OUT REPORT:    Verbal report given to Kat NAJERA on Juany Foy  being transferred to [place] [address] for routine progression of patient care       Report consisted of patient's Situation, Background, Assessment and   Recommendations(SBAR).     Information from the following report(s) Nurse Handoff Report was reviewed with the receiving nurse.           Lines:   Peripheral IV 06/17/24 Left;Posterior Wrist (Active)   Site Assessment Clean, dry & intact 06/25/24 1200   Line Status Capped;Flushed 06/25/24 1200   Line Care Cap changed 06/25/24 1200   Phlebitis Assessment No symptoms 06/25/24 1200   Infiltration Assessment 0 06/25/24 1200   Alcohol Cap Used Yes 06/25/24 1200   Dressing Status Clean, dry & intact 06/25/24 1200   Dressing Type Transparent 06/25/24 1200       Peripheral IV 06/17/24 Right Antecubital (Active)   Site Assessment Clean;Intact;Leaking 06/25/24 1200   Line Status Flushed;Sluggish blood return 06/25/24 1200   Line Care Cap changed 06/25/24 1200   Phlebitis Assessment No symptoms 06/25/24 1200   Infiltration Assessment 0 06/25/24 1200   Alcohol Cap Used Yes 06/25/24 1200   Dressing Status Clean, dry & intact 06/25/24 1200   Dressing Type Transparent 06/25/24 1200        Opportunity for questions and clarification was provided.      Patient transported with:  Registered Nurse       
TRANSFER - OUT REPORT:    Verbal report given to Noemi NAJERA on Juany Foy  being transferred to [facility] for routine progression of patient care       Report consisted of patient's Situation, Background, Assessment and   Recommendations(SBAR).     Information from the following report(s) Nurse Handoff Report, Intake/Output, MAR, Recent Results, Med Rec Status, Cardiac Rhythm afib, and Alarm Parameters was reviewed with the receiving nurse.           Lines:   Peripheral IV 06/17/24 Left;Posterior Wrist (Active)   Site Assessment Clean, dry & intact 06/18/24 2300   Line Status Infusing;Capped 06/18/24 2300   Line Care Connections checked and tightened;Cap changed 06/18/24 2300   Phlebitis Assessment No symptoms 06/18/24 2300   Infiltration Assessment 0 06/18/24 2300   Alcohol Cap Used Yes 06/18/24 2300   Dressing Status Clean, dry & intact 06/18/24 2300   Dressing Type Transparent 06/18/24 2300       Peripheral IV 06/17/24 Right Antecubital (Active)   Site Assessment Clean, dry & intact 06/18/24 2300   Line Status Infusing;Capped 06/18/24 2300   Line Care Connections checked and tightened;Cap changed 06/18/24 2300   Phlebitis Assessment No symptoms 06/18/24 2300   Infiltration Assessment 0 06/18/24 2300   Alcohol Cap Used Yes 06/18/24 2300   Dressing Status Clean, dry & intact 06/18/24 2300   Dressing Type Transparent 06/18/24 2300        Opportunity for questions and clarification was provided.      Patient transported with:  Monitor, O2 @ 4lpm, and Registered Nurse       
Therapy Note:  Therapist participated in ICU/CCU IDT rounds and believe patient is currently functioning below baseline functional mobility/ADL performance.  Patient could benefit from skilled therapy services when MD deems medically appropriate.  Thank you,  Katie Delatorre, PT    
Therapy Note:  Therapist participated in ICU/CCU IDT rounds and believe patient is currently functioning below baseline functional mobility/ADL performance.  Patient could benefit from skilled therapy services when MD deems medically appropriate.  Thank you,  Katie Delatorre, PT     
US Guided PIV access-    Ultrasound was used to find the vein which was compressible and without any ultrasound features of an artery or nerve bundle. Skin was cleaned and disinfected prior to IV puncture.  Under real-time ultrasound guidance peripheral access was obtained in the left forearm using 22 G 1.75\" Peripheral IV catheter after 1 attempt(s). Blood return was present and IV flushed without difficulty with no clinical signs of infiltration. IV dressing applied and no immediate complications noted. Patient tolerated the procedure well.    
Upon assessment no palpable pulse, no spontaneous respirations. Dr. Sullivan notified and assessed pt and pronounced pt  at 0305. House supervisor notified. Family at bedside.   
%  BMI Category Overweight (BMI 25.0-29.9)  Estimated Daily Nutrient Needs:  Energy (kcal/day): 9535-0035 (20-25 kcal/kg) (Kcal/kg Weight used: 67 kg Current  Protein (g/day): 67-80 (1-1.2 g/kg) Weight Used: (Current) 67 kg  Fluid (ml/day):   (1 ml/kcal)    Nutrition Diagnosis:   Inadequate oral intake related to  (poor appetite, fatigue) as evidenced by  (reported barriers, intake ~25% needs)  Nutrition Interventions:   Food and/or Nutrient Delivery: Continue Current Diet, Continue Oral Nutrition Supplement     Coordination of Nutrition Care: Continue to monitor while inpatient      Goals:      Active Goal: PO intake 50% or greater, by next RD assessment       Nutrition Monitoring and Evaluation:      Food/Nutrient Intake Outcomes: Food and Nutrient Intake, Supplement Intake  Physical Signs/Symptoms Outcomes: Weight, Biochemical Data, Fluid Status or Edema, Meal Time Behavior    Discharge Planning:    Too soon to determine    Radha Mckeon RD      
7/4. She was transferred back to medical floor with comfort care measures in place.    Subjective & 24hr Events:   Patient resting comfortably in bed. Respirations are even and unlabored. HR is irregular and tachycardic. Patient is in no acute distress. Family is at bedside. Maintain comfort care measures at this time.    Assessment & Plan:     Atrial fibrillation RVR  Atrial Thrombus  -Comfort care measures     Pulmonary embolism (HCC)  -Comfort care measures     Grade II diastolic dysfunction  -Comfort care measures     Hypertension  -Comfort care measures     Abdominal aortic aneurysm (HCC)  -Comfort care measures     Acute respiratory failure with hypoxia (HCC)  Bilateral pleural effusions s/p thoracentesis  -Comfort care measures     Encounter for palliative care  End of life care  -Comfort care measures     Disposition: comfort care  Diet:  ADULT ORAL NUTRITION SUPPLEMENT; Breakfast, Lunch, Dinner; Standard High Calorie/High Protein Oral Supplement  VTE prophylaxis: Defer  Code status: DNR      Non-peripheral Lines and Tubes (if present):      Urinary Catheter 06/22/24 (Active)        Telemetry (if present):  Cardiac/Telemetry Monitor On: Bedside monitor in use        Hospital Problems:  Principal Problem:    Atrial fibrillation (HCC)  Active Problems:    Pulmonary embolism (HCC)    GOULD (nonalcoholic steatohepatitis)    Paroxysmal atrial fibrillation (HCC)    Myocardiopathy (HCC)    Hyperlipidemia    Grade II diastolic dysfunction    Hypertension    AAA (abdominal aortic aneurysm) (HCC)    Bilateral pleural effusion    Acute respiratory failure (HCC)    Atrial thrombosis    Hyperglycemia    S/P thoracentesis    Frailty    Encounter for palliative care    End of life care    Shortness of breath    Fever  Resolved Problems:    Hypernatremia    Lactic acidosis    JOE (acute kidney injury) (HCC)    Hypoxia    Elevated bilirubin    Lower extremity edema    Pleural effusion      Objective:   Patient Vitals for the 
Oral Lunch     Review of Systems: Comprehensive ROS negative except in HPI  Objective:   Blood pressure (!) 124/91, pulse (!) 107, temperature (!) 96.1 °F (35.6 °C), temperature source Temporal, resp. rate 18, height 1.524 m (5'), weight 67 kg (147 lb 11.3 oz), SpO2 92 %.   Intake/Output Summary (Last 24 hours) at 6/21/2024 1004  Last data filed at 6/21/2024 0704  Gross per 24 hour   Intake 420 ml   Output 1250 ml   Net -830 ml       Physical Exam:   Constitutional:  the patient is elderly and in no acute distress  EENMT:  Sclera clear, pupils equal, oral mucosa moist  Respiratory: symmetric chest rise. Diminished in bases; on 4 lpm   Cardiovascular:  RRR without M,G,R. There is +2 pitting  lower extremity edema. +2 pitting upper extremity edema  Gastrointestinal: soft and non-tender; with positive bowel sounds.  Musculoskeletal: warm without cyanosis. Normal muscle tone.   Skin:  no jaundice or rashes, no wounds   Neurologic: symmetric strength, fluent speech  Psychiatric:  calm, appropriate, oriented x 4    Imaging: I performed an independent interpretation of the patient's images.  CXR  6/20 6/17      CTA      LAB:  Recent Labs     06/19/24 0449 06/20/24 0608 06/21/24  0445   WBC 10.1 8.1 9.7   HGB 13.0 12.3 13.4   HCT 41.2 39.7 42.4   * 101* 110*       Recent Labs     06/19/24 0449 06/20/24 0428 06/21/24  0445   * 143 147*   K 3.5 3.7 3.3*   * 106 105   CO2 25 26 27   BUN 43* 44* 38*   CREATININE 1.46* 1.16* 0.87   MG 1.9  --  1.8   BILITOT 1.6* 1.0 1.0   * 135* 117*   * 257* 255*   ALKPHOS 196* 193* 186*       No results for input(s): \"TROPHS\", \"NTPROBNP\", \"CRP\", \"ESR\" in the last 72 hours.    Recent Labs     06/19/24 0449 06/20/24 0428 06/21/24  0445   GLUCOSE 210* 212* 192*        Microbiology:   No results for input(s): \"CULTURE\" in the last 72 hours.    No results for input(s): \"COVID19\" in the last 72 hours.  ECHO: 06/17/24    ECHO (TTE) COMPLETE (PRN 
q6h. Switch to metoprolol succinate once rates stable. Defer diltiazem given LV dysfunction. Consider digoxin if needed.  - [id number] (age +2, female, HF). Continue heparin gtt. Switch to oral anticoagulation when indicated.    # Cardiomyopathy  - EF 40-45%.Volume status improved. Continue Lasix 40mg IV BID.  - Start Jardiance 10mg. Consider MRA as BP tolerates.and Cr improves.    # PE  - Anticoagulation management per pulmonary.    # Choledocholithiasis  - s/p ERCP with stent placement            Denny Rich DO    
  HGB 14.3 12.7 12.5   HCT 44.5 38.9 38.1    193 215       Recent Labs     06/26/24  0342 06/27/24  0338 06/28/24  0507    138 137   K 4.4 4.5 4.6   CL 93* 95* 96*   CO2 34* 33* 34*   BUN 24* 25* 30*   CREATININE 0.69 0.72 0.71   MG 1.8 2.0 2.1       No results for input(s): \"TROPHS\", \"NTPROBNP\", \"CRP\", \"ESR\" in the last 72 hours.    Recent Labs     06/26/24  0342 06/27/24  0338 06/28/24  0507   GLUCOSE 100* 146* 198*        Microbiology:   No results for input(s): \"CULTURE\" in the last 72 hours.    No results for input(s): \"COVID19\" in the last 72 hours.  ECHO: 06/17/24    ECHO (TTE) COMPLETE (PRN CONTRAST/BUBBLE/STRAIN/3D) 06/18/2024  3:27 PM (Final)    Interpretation Summary    Left Ventricle: Moderately reduced left ventricular systolic function with a visually estimated EF of 40 - 45%. Left ventricle is mildly dilated. Normal wall thickness. Septal flattening in diastole and systole consistent with right ventricular volume and pressure overload. Moderate global hypokinesis present. Indeterminate diastolic function.    Right Ventricle: Right ventricle is moderately dilated. Reduced systolic function.    Mitral Valve: Mild regurgitation.    Tricuspid Valve: Moderate to severe regurgitation. The estimated RVSP is 38 mmHg.    Left Atrium: Left atrium is severely dilated.    Right Atrium: Right atrium is severely dilated.    Image quality is suboptimal. Contrast used: Definity.    Signed by: Kp Hernández MD on 6/18/2024  3:27 PM    Assessment and Plan:  (Medical Decision Making)   Impression: 89 y.o female with new PE, a-fib RVR, JOE, elevated LFT, right pleural effusion. Hypoxic requiring high flow O2 now weaning. Was on cardizem for a-fib and heparin drip.     Principal Problem:    Atrial fibrillation with rapid ventricular response (HCC)  Plan: per cardiology; off cardizem drip now; did get IV amiodarone for short period.  On PO therapy with metoprolol, diltiazem, heart rate now upper 90s to 
100 mg twice daily by mouth with additional IV metoprolol.  I will place her on diltiazem in addition by mouth 60 mg every 6 hours as long as systolic blood pressures greater than 100 mmHg with heart rates above 90 bpm.  We are hoping that heart rates would improve once respiratory status is better.  Digoxin is another option but considering her age and mild renal dysfunction, we have held off on this.  She also had evidence of sinus pauses previously which is why we have held off on digoxin.  If heart rates trend downwards, we would discontinue IV amiodarone especially with left atrial appendage thrombus.  -  Abad Nguyễn MD  7/3/2024 8:42 AM   
aneurysm) (HCC)    Bilateral pleural effusion    Elevated bilirubin    Lower extremity edema    Acute respiratory failure with hypoxia (HCC)    Pleural effusion, right  Resolved Problems:    Hypoxia      Objective:   Patient Vitals for the past 24 hrs:   Temp Pulse Resp BP SpO2   06/24/24 0716 97.5 °F (36.4 °C) 86 17 132/74 99 %   06/24/24 0421 97.5 °F (36.4 °C) 71 16 (!) 119/90 100 %   06/24/24 0327 -- (!) 117 -- (!) 138/95 --   06/24/24 0022 97.5 °F (36.4 °C) 79 16 134/89 99 %   06/23/24 2130 -- (!) 118 -- (!) 149/96 --   06/23/24 2021 97.7 °F (36.5 °C) 85 16 134/75 99 %   06/23/24 1623 97.2 °F (36.2 °C) (!) 122 16 122/80 99 %   06/23/24 1135 97.2 °F (36.2 °C) (!) 122 16 90/79 98 %   06/23/24 0755 97.3 °F (36.3 °C) (!) 154 16 129/84 99 %         Oxygen Therapy  SpO2: 99 %  Pulse Oximetry Type: Continuous  Pulse via Oximetry: 77 beats per minute  Pulse Oximeter Device Mode: Continuous  Pulse Oximeter Device Location: Left, Hand  O2 Device: Nasal cannula  Oximetry Probe Site Changed: Yes  Skin Assessment: Clean, dry, & intact  Skin Protection for O2 Device: N/A  FiO2 : 45 %  O2 Flow Rate (L/min): 3 L/min    Estimated body mass index is 28.85 kg/m² as calculated from the following:    Height as of this encounter: 1.524 m (5').    Weight as of this encounter: 67 kg (147 lb 11.3 oz).    Intake/Output Summary (Last 24 hours) at 6/24/2024 0722  Last data filed at 6/24/2024 0400  Gross per 24 hour   Intake 500 ml   Output 800 ml   Net -300 ml           Physical Exam:     General:    Well nourished.  Alert, no distress   CV:   irregular, no edema   Lungs:   CTAB.  No wheezing, rhonchi, or rales.  Symmetric expansion. Anterior   Abdomen:   Soft, nontender, nondistended.  Extremities: No cyanosis or clubbing.  Bl/ pedal edema  Skin:     No rashes.  Normal coloration.   Warm and dry.    Neuro:  grossly intact.    Psych:  Normal mood and affect.      I have personally reviewed labs and tests:  Recent Labs:  Recent Results 
°C) (!) 117 16 107/75 91 %         Oxygen Therapy  SpO2: 98 %  Pulse Oximetry Type: Continuous  Pulse via Oximetry: 91 beats per minute  Pulse Oximeter Device Mode: Continuous  Pulse Oximeter Device Location: Left, Hand  O2 Device: Nasal cannula  Oximetry Probe Site Changed: Yes  Skin Assessment: Clean, dry, & intact  Skin Protection for O2 Device: No  FiO2 : 45 %  O2 Flow Rate (L/min): 4 L/min    Estimated body mass index is 27.73 kg/m² as calculated from the following:    Height as of this encounter: 1.524 m (5').    Weight as of this encounter: 64.4 kg (142 lb).    Intake/Output Summary (Last 24 hours) at 6/27/2024 1340  Last data filed at 6/27/2024 0833  Gross per 24 hour   Intake 500 ml   Output 775 ml   Net -275 ml           Physical Exam:   General:    Well nourished.    Head:  Normocephalic, atraumatic  Eyes:  Sclerae appear normal.  Pupils equally round.  CV:   irregular.  No jugular venous distension.  Lungs:   ND    Symmetric expansion.  Abdomen:   nondistended.  Extremities: No cyanosis or clubbing.  trace BLE edema.  RUE edema.  Skin:     No rashes.  Normal coloration.   Warm and dry.    Neuro:  CN II-XII grossly intact.    Psych:  Normal mood and affect.      I have personally reviewed labs and tests:  Recent Labs:  Recent Results (from the past 48 hour(s))   MAGUI with possible cardioversion (PRN contrast/bubble/3D)    Collection Time: 06/25/24  2:54 PM   Result Value Ref Range    Body Surface Area 1.68 m2   Basic Metabolic Panel    Collection Time: 06/26/24  3:42 AM   Result Value Ref Range    Sodium 140 136 - 145 mmol/L    Potassium 4.4 3.5 - 5.1 mmol/L    Chloride 93 (L) 98 - 107 mmol/L    CO2 34 (H) 20 - 28 mmol/L    Anion Gap 12 9 - 18 mmol/L    Glucose 100 (H) 70 - 99 mg/dL    BUN 24 (H) 8 - 23 MG/DL    Creatinine 0.69 0.60 - 1.10 MG/DL    Est, Glom Filt Rate 83 >60 ml/min/1.73m2    Calcium 8.7 (L) 8.8 - 10.2 MG/DL   Magnesium    Collection Time: 06/26/24  3:42 AM   Result Value Ref Range    
- 2.4 mg/dL   CBC with Auto Differential    Collection Time: 06/28/24  5:07 AM   Result Value Ref Range    WBC 9.5 4.3 - 11.1 K/uL    RBC 3.89 (L) 4.05 - 5.2 M/uL    Hemoglobin 12.5 11.7 - 15.4 g/dL    Hematocrit 38.1 35.8 - 46.3 %    MCV 97.9 82 - 102 FL    MCH 32.1 26.1 - 32.9 PG    MCHC 32.8 31.4 - 35.0 g/dL    RDW 13.8 11.9 - 14.6 %    Platelets 215 150 - 450 K/uL    MPV 10.0 9.4 - 12.3 FL    nRBC 0.00 0.0 - 0.2 K/uL    Differential Type AUTOMATED      Neutrophils % 89 (H) 43 - 78 %    Lymphocytes % 6 (L) 13 - 44 %    Monocytes % 4 4.0 - 12.0 %    Eosinophils % 0 (L) 0.5 - 7.8 %    Basophils % 0 0.0 - 2.0 %    Immature Granulocytes % 1 0.0 - 5.0 %    Neutrophils Absolute 8.3 (H) 1.7 - 8.2 K/UL    Lymphocytes Absolute 0.6 0.5 - 4.6 K/UL    Monocytes Absolute 0.4 0.1 - 1.3 K/UL    Eosinophils Absolute 0.0 0.0 - 0.8 K/UL    Basophils Absolute 0.0 0.0 - 0.2 K/UL    Immature Granulocytes Absolute 0.1 0.0 - 0.5 K/UL   Basic Metabolic Panel    Collection Time: 06/29/24  3:28 AM   Result Value Ref Range    Sodium 135 (L) 136 - 145 mmol/L    Potassium 5.1 3.5 - 5.1 mmol/L    Chloride 96 (L) 98 - 107 mmol/L    CO2 33 (H) 20 - 28 mmol/L    Anion Gap 6 (L) 9 - 18 mmol/L    Glucose 166 (H) 70 - 99 mg/dL    BUN 30 (H) 8 - 23 MG/DL    Creatinine 0.71 0.60 - 1.10 MG/DL    Est, Glom Filt Rate 82 >60 ml/min/1.73m2    Calcium 8.8 8.8 - 10.2 MG/DL   Magnesium    Collection Time: 06/29/24  3:28 AM   Result Value Ref Range    Magnesium 2.1 1.8 - 2.4 mg/dL   CBC with Auto Differential    Collection Time: 06/29/24  3:28 AM   Result Value Ref Range    WBC 10.4 4.3 - 11.1 K/uL    RBC 3.83 (L) 4.05 - 5.2 M/uL    Hemoglobin 12.5 11.7 - 15.4 g/dL    Hematocrit 38.3 35.8 - 46.3 %    .0 82 - 102 FL    MCH 32.6 26.1 - 32.9 PG    MCHC 32.6 31.4 - 35.0 g/dL    RDW 13.8 11.9 - 14.6 %    Platelets 237 150 - 450 K/uL    MPV 10.3 9.4 - 12.3 FL    nRBC 0.00 0.0 - 0.2 K/uL    Differential Type AUTOMATED      Neutrophils % 87 (H) 43 - 78 %    
diltiazem, heart rate now upper 90s to 110's    Active Problems:    Pulmonary embolism (HCC)  Plan: hematology saw and said it is okay to restart eliquis and this is not eliquis failure (was off for 5 days).   --Now on Eliquis      Elevated LFTs  Plan: improving; GI following; s/p ERCP 6/20; not planning on another procedure for 6-8 weeks       Bilateral pleural effusion  Plan: Needs thoracentesis, the effusions are large and not improving, I will increase the Lasix from 40 mg daily IV to twice a day for the next 4 doses however we will prepare the patient for a bilateral thoracentesis for Tuesday, Eliquis will be stopped and the patient started on a heparin drip.  The heparin drip will need to be stopped at 4:00 on Tuesday morning for anticipated procedure at 10:00 in the morning that day i.e. 6 hours of heparin.  Once thoracentesis is performed, she can either go back on heparin or start Eliquis again.      Hypoxia  Plan: wean as able.  Now on 3.5L  -- Multifactorial with pleural effusions, A-fib that was difficult to control, anemia, PEs.      Generally debilitated.  Encouraged mobility, incentive spirometer, Cough and deep breathing.  Will likely need rehab.    More than 50% of the time documented was spent in face-to-face contact with the patient and in the care of the patient on the floor/unit where the patient is located.    Discussed with nursing, will see the patient on Monday and plan for thoracentesis on Tuesday 10 AM Case requested      Tomasz Whitfield MD          
for discharge home , and prepare for self care..   Self Care (10 minutes): Patient participated in grooming ADLs in unsupported sitting with moderate verbal, manual, and tactile cueing to increase independence, decrease assistance required, increase activity tolerance, and increase safety awareness. Patient also participated in bed mobility, functional mobility, functional transfer, energy conservation, and adaptive equipment training to increase independence, decrease assistance required, increase activity tolerance, and increase safety awareness.     TREATMENT GRID:  N/A    AFTER TREATMENT PRECAUTIONS: Alarm Activated, Bed/Chair Locked, Call light within reach, Chair, Heels floated, Needs within reach, and RN notified    INTERDISCIPLINARY COLLABORATION:  RN/ PCT and PT/ PTA    EDUCATION:  Education Given To: Patient  Education Provided: Plan of Care  Barriers to Learning: Hearing  Education Outcome: Continued education needed;Verbalized understanding    TOTAL TREATMENT DURATION AND TIME:  Time In: 1003  Time Out: 1030  Minutes: 27    MARILEE LEWIS, OT , OTS            
high flow O2 now weaning. Was on cardizem for a-fib and heparin drip.     Principal Problem:    Atrial fibrillation with rapid ventricular response (HCC)  Now on amiodarone drip at 0.5 mg/min, IV Cardizem and Toprol.    Active Problems:    Pulmonary embolism (HCC)  Small.  hematology saw and said it is okay to restart eliquis and this is not eliquis failure (was off for 5 days).   --Now on Eliquis/hep bridge for thoracentesis on Tuesday      Elevated LFTs  Plan: improving; GI following; s/p ERCP 6/20; not planning on another procedure for 6-8 weeks   -- Significant third spacing, so we will recheck LFTs    TRACE clot  On heparin drip with bridge      Bilateral pleural effusion  Thoracentesis planned tomorrow      Hypoxia  Plan: wean as able.    -- Multifactorial with pleural effusions, A-fib that was difficult to control, anemia, PEs.      Maris Grove MD      
mmol/L    Potassium 5.1 3.5 - 5.1 mmol/L    Chloride 96 (L) 98 - 107 mmol/L    CO2 33 (H) 20 - 28 mmol/L    Anion Gap 6 (L) 9 - 18 mmol/L    Glucose 166 (H) 70 - 99 mg/dL    BUN 30 (H) 8 - 23 MG/DL    Creatinine 0.71 0.60 - 1.10 MG/DL    Est, Glom Filt Rate 82 >60 ml/min/1.73m2    Calcium 8.8 8.8 - 10.2 MG/DL   Magnesium    Collection Time: 06/29/24  3:28 AM   Result Value Ref Range    Magnesium 2.1 1.8 - 2.4 mg/dL   CBC with Auto Differential    Collection Time: 06/29/24  3:28 AM   Result Value Ref Range    WBC 10.4 4.3 - 11.1 K/uL    RBC 3.83 (L) 4.05 - 5.2 M/uL    Hemoglobin 12.5 11.7 - 15.4 g/dL    Hematocrit 38.3 35.8 - 46.3 %    .0 82 - 102 FL    MCH 32.6 26.1 - 32.9 PG    MCHC 32.6 31.4 - 35.0 g/dL    RDW 13.8 11.9 - 14.6 %    Platelets 237 150 - 450 K/uL    MPV 10.3 9.4 - 12.3 FL    nRBC 0.00 0.0 - 0.2 K/uL    Differential Type AUTOMATED      Neutrophils % 87 (H) 43 - 78 %    Lymphocytes % 6 (L) 13 - 44 %    Monocytes % 5 4.0 - 12.0 %    Eosinophils % 1 0.5 - 7.8 %    Basophils % 0 0.0 - 2.0 %    Immature Granulocytes % 1 0.0 - 5.0 %    Neutrophils Absolute 9.1 (H) 1.7 - 8.2 K/UL    Lymphocytes Absolute 0.6 0.5 - 4.6 K/UL    Monocytes Absolute 0.5 0.1 - 1.3 K/UL    Eosinophils Absolute 0.1 0.0 - 0.8 K/UL    Basophils Absolute 0.0 0.0 - 0.2 K/UL    Immature Granulocytes Absolute 0.1 0.0 - 0.5 K/UL   CBC    Collection Time: 06/29/24 10:50 AM   Result Value Ref Range    WBC 8.7 4.3 - 11.1 K/uL    RBC 3.82 (L) 4.05 - 5.2 M/uL    Hemoglobin 12.2 11.7 - 15.4 g/dL    Hematocrit 38.2 35.8 - 46.3 %    .0 82 - 102 FL    MCH 31.9 26.1 - 32.9 PG    MCHC 31.9 31.4 - 35.0 g/dL    RDW 14.0 11.9 - 14.6 %    Platelets 223 150 - 450 K/uL    MPV 10.3 9.4 - 12.3 FL    nRBC 0.00 0.0 - 0.2 K/uL   APTT    Collection Time: 06/29/24 10:50 AM   Result Value Ref Range    APTT 35.0 23.3 - 37.4 SEC   Protime-INR    Collection Time: 06/29/24 10:50 AM   Result Value Ref Range    Protime 25.7 (H) 11.3 - 14.9 sec    INR 2.2   
wean as tolerates       More than 50% of the time documented was spent in face-to-face contact with the patient and in the care of the patient on the floor/unit where the patient is located.    In this split/shared evaluation I performed performed a medically appropriate history and exam, counseled and educated the patient and/or family member, documented information in EMR, and coordinated care. which accounted for 13 minutes of clinical time.     ZENA Goldman - NP  In this split/shared evaluation I performed reviewed the patients's H&P, available images, labs, cultures., discussed case in detail with NPP, performed a medically appropriate history and exam, counseled and educated the patient and/or family member, ordered and/or reviewed medications, tests or procedures, documented information in EMR, independently interpreted images, and coordinated care. which accounted for 15 minutes clinical time.     Impression:   89 y.o. female with new PE rapid a fib, JOE, elevated liver enzymes, R pleural effusion admitted on 6/17 to icu  and moved to floor  6/19 Had been off eliquis for 5 days pta and ct shows presumably new pte.  Currently on heparin drip. Will follow up cxr tomorrow.  May need thoracentesis at some point but initially effusions not large enough to tap. Afib rate under better control -cxr pending      Jg Anthony Jr, MD       
almost 1 L negative. On 5 LPM oxygen and add in IS.      Anthony Meek MD          
extremely weak and in rapid atrial fibrillation.  She is not requiring vasopressors but requires CPAP or high flow airvo to support her breathing.  Her alertness is improved today and her CXR looks better after thoracentesis and diuresis.  Palliative care was consulted, though pt is already DNR, because we are concerned her debility and dyspnea may ultimately be difficult to manage symptomatically and her mortality risk is quite high.    Will increase eliquis dose given known TRACE thrombus and small Pes.      Maris Grove MD      
member, ordered medications, tests or procedures, documented information in EMR, and coordinated care. which accounted for 14 minutes of clinical time.       ZENA Lopez - CNP      In this split/shared evaluation I performed reviewed the patients's H&P, available images, labs, cultures., ordered and/or reviewed medications, tests or procedures, documented information in EMR, independently interpreted images, and coordinated care. which accounted for 22 minutes clinical time.     Impression: Patient with A-fib on Eliquis and planning for thoracentesis right and possible left.  Will have to do it earliest will be on Tuesday since has been on Eliquis.  Currently on heparin bridge at this time.  Still on BiPAP and oxygen needs are high at 85%.  Patient clinically appears tired.  Heart rate is going up to the 140s despite Cardizem which was started today.  Previously had Lopressor 200 twice a day but did have pauses with that as well and that was with the Cardizem.  Will cut the Lopressor down to 100 every 12 and spoke with Dr. Joel Mcgovern and indicates overall this may be a chronic condition and may not really resolve by much.  Agrees to trial of amiodarone with bolus and infusion at 1 to see if it makes any difference for her.  Will stop Cardizem drip.  Heparin is still on board.  May even try BiPAP when the patient is in the ICU, since using some abdominal muscles with respiration.  Currently on Airvo and spoke with respiratory and are down to 60%.  Not sure how long that will last.  Patient is aware that her respiratory status is little tenuous and she has volume overload.  She has got Lasix today. I called her daughter and told her.     Condition is critical   Called ICU coordinator as well.          Anthony Meek MD      
POC    Collection Time: 07/01/24  7:42 AM   Result Value Ref Range    DEVICE BIPAP MASK      FIO2 70 %    POC pH 7.47 (H) 7.35 - 7.45      POC pCO2 53.1 (H) 35 - 45 MMHG    POC PO2 156 (H) 75 - 100 MMHG    POC HCO3 38.6 (H) 22 - 26 MMOL/L    POC O2 SAT 99.5 (H) 95 - 98 %    Base Excess 12.7 mmol/L    Mode BILEVEL      IPAP/PIP/High PEEP 14      POC Pressure Support 6 cmH2O    POC Rancho's Test Positive      Inspiratory Time 0.9 sec    Respiratory Rate 20      Site LEFT RADIAL      Specimen type: ARTERIAL      Performed by: Bobbi     Respiratory Comment: 6.1ve    APTT    Collection Time: 07/01/24  2:33 PM   Result Value Ref Range    APTT 50.2 (H) 23.3 - 37.4 SEC       No results for input(s): \"COVID19\" in the last 72 hours.    Current Meds:  Current Facility-Administered Medications   Medication Dose Route Frequency    lubrifresh P.M. (artificial tears) ophthalmic ointment   Both Eyes PRN    amiodarone (CORDARONE) 450 mg in dextrose 5 % 250 mL infusion ([id number])  0.5 mg/min IntraVENous Continuous    metoprolol succinate (TOPROL XL) extended release tablet 100 mg  100 mg Oral Q12H    heparin (porcine) injection 5,100 Units  80 Units/kg IntraVENous PRN    heparin (porcine) injection 2,500 Units  40 Units/kg IntraVENous PRN    heparin 25,000 units in dextrose 5% 250 mL (premix) infusion  5-30 Units/kg/hr IntraVENous Continuous    pantoprazole (PROTONIX) tablet 40 mg  40 mg Oral BID AC    potassium chloride (KLOR-CON M) extended release tablet 20 mEq  20 mEq Oral BID WC    potassium chloride (KLOR-CON M) extended release tablet 40 mEq  40 mEq Oral PRN    Or    potassium bicarb-citric acid (EFFER-K) effervescent tablet 40 mEq  40 mEq Oral PRN    Or    potassium chloride 10 mEq/100 mL IVPB (Peripheral Line)  10 mEq IntraVENous PRN    magnesium sulfate 2000 mg in 50 mL IVPB premix  2,000 mg IntraVENous PRN    spironolactone (ALDACTONE) tablet 25 mg  25 mg Oral Daily    empagliflozin (JARDIANCE) tablet 10 mg  10 mg 
ferrous sulfate (IRON 325) tablet 325 mg  325 mg Oral Lunch       Signed:  Hayder Aly,     Part of this note may have been written by using a voice dictation software.  The note has been proof read but may still contain some grammatical/other typographical errors.  
some grammatical/other typographical errors.  
Medications   Medication Dose Route Frequency    furosemide (LASIX) injection 40 mg  40 mg IntraVENous Daily    apixaban (ELIQUIS) tablet 5 mg  5 mg Oral BID    dilTIAZem (CARDIZEM) tablet 60 mg  60 mg Oral 4 times per day    morphine (PF) injection 1 mg  1 mg IntraVENous Q4H PRN    potassium bicarb-citric acid (EFFER-K) effervescent tablet 20 mEq  20 mEq Oral Daily    pantoprazole (PROTONIX) 40 mg in sodium chloride (PF) 0.9 % 10 mL injection  40 mg IntraVENous BID AC    lubrifresh P.M. (artificial tears) ophthalmic ointment   Both Eyes PRN    glucose chewable tablet 16 g  4 tablet Oral PRN    dextrose bolus 10% 125 mL  125 mL IntraVENous PRN    Or    dextrose bolus 10% 250 mL  250 mL IntraVENous PRN    Glucagon Emergency KIT 1 mg  1 mg SubCUTAneous PRN    dextrose 10 % infusion   IntraVENous Continuous PRN    insulin lispro (HUMALOG,ADMELOG) injection vial 0-4 Units  0-4 Units SubCUTAneous TID WC    insulin lispro (HUMALOG,ADMELOG) injection vial 0-4 Units  0-4 Units SubCUTAneous Nightly    amiodarone (CORDARONE) 450 mg in dextrose 5 % 250 mL infusion ([id number])  1 mg/min IntraVENous Continuous    metoprolol succinate (TOPROL XL) extended release tablet 100 mg  100 mg Oral Q12H    potassium chloride (KLOR-CON M) extended release tablet 40 mEq  40 mEq Oral PRN    Or    potassium bicarb-citric acid (EFFER-K) effervescent tablet 40 mEq  40 mEq Oral PRN    Or    potassium chloride 10 mEq/100 mL IVPB (Peripheral Line)  10 mEq IntraVENous PRN    magnesium sulfate 2000 mg in 50 mL IVPB premix  2,000 mg IntraVENous PRN    spironolactone (ALDACTONE) tablet 25 mg  25 mg Oral Daily    empagliflozin (JARDIANCE) tablet 10 mg  10 mg Oral Daily    metoprolol (LOPRESSOR) injection 5 mg  5 mg IntraVENous Q6H PRN    [Held by provider] tamsulosin (FLOMAX) capsule 0.4 mg  0.4 mg Oral Daily    sennosides-docusate sodium (SENOKOT-S) 8.6-50 MG tablet 2 tablet  2 tablet Oral BID    polyethylene glycol (GLYCOLAX) packet 17 g  17 g Oral 
mg  25 mg Oral Daily    empagliflozin (JARDIANCE) tablet 10 mg  10 mg Oral Daily    metoprolol (LOPRESSOR) injection 5 mg  5 mg IntraVENous Q6H PRN    [Held by provider] tamsulosin (FLOMAX) capsule 0.4 mg  0.4 mg Oral Daily    sennosides-docusate sodium (SENOKOT-S) 8.6-50 MG tablet 2 tablet  2 tablet Oral BID    polyethylene glycol (GLYCOLAX) packet 17 g  17 g Oral Daily    escitalopram (LEXAPRO) tablet 20 mg  20 mg Oral Daily    levothyroxine (SYNTHROID) tablet 88 mcg  88 mcg Oral Daily    ferrous sulfate (IRON 325) tablet 325 mg  325 mg Oral Lunch       Signed:  Stevo Henderson MD    Part of this note may have been written by using a voice dictation software.  The note has been proof read but may still contain some grammatical/other typographical errors.

## 2024-07-11 LAB
FUNGUS SMEAR: NORMAL
SPECIMEN SOURCE: NORMAL

## 2024-08-01 LAB
FUNGAL CULT/SMEAR: NORMAL
FUNGUS (MYCOLOGY) CULTURE: NORMAL
FUNGUS SMEAR: NORMAL
REFLEX TO ID: NORMAL
SPECIMEN PROCESSING: NORMAL
SPECIMEN SOURCE: NORMAL
SPECIMEN SOURCE: NORMAL

## 2024-08-17 LAB
ACID FAST STN SPEC: NEGATIVE
MYCOBACTERIUM SPEC QL CULT: NEGATIVE
SPECIMEN PREPARATION: NORMAL
SPECIMEN SOURCE: NORMAL

## (undated) DEVICE — ELECTRODE PT RET AD L9FT HI MOIST COND ADH HYDRGEL CORDED

## (undated) DEVICE — MOUTHPIECE ENDOSCP L CTRL OPN AND SIDE PORTS DISP

## (undated) DEVICE — SPHINCTEROTOME: Brand: DREAMTOME™ RX 44

## (undated) DEVICE — KIT THORCENT 8FR L5IN POLYUR W/ 18/22/25GA NDL 3 W STPCOCK

## (undated) DEVICE — CANNULA NSL ORAL AD FOR CAPNOFLEX CO2 O2 AIRLFE

## (undated) DEVICE — KENDALL RADIOLUCENT FOAM MONITORING ELECTRODE RECTANGULAR SHAPE: Brand: KENDALL

## (undated) DEVICE — STERILE POLYISOPRENE POWDER-FREE SURGICAL GLOVES: Brand: PROTEXIS

## (undated) DEVICE — AIRLIFE™ OXYGEN TUBING 7 FEET (2.1 M) CRUSH RESISTANT OXYGEN TUBING, VINYL TIPPED: Brand: AIRLIFE™

## (undated) DEVICE — GAUZE,SPONGE,4"X4",12PLY,WOVEN,NS,LF: Brand: MEDLINE

## (undated) DEVICE — SINGLE PORT MANIFOLD: Brand: NEPTUNE 2

## (undated) DEVICE — ENDOSCOPIC KIT 1.1+ OP4 CA DE 2 GWN AAMI LEVEL 3

## (undated) DEVICE — RETRIEVAL BALLOON CATHETER: Brand: EXTRACTOR™ PRO RX

## (undated) DEVICE — TRIPLE LUMEN NEEDLE KNIFE: Brand: RX NEEDLE KNIFE XL

## (undated) DEVICE — CONNECTOR TBNG OD5-7MM O2 END DISP

## (undated) DEVICE — SPHINCTEROTOME: Brand: JAGTOME RX 39